# Patient Record
Sex: FEMALE | Race: WHITE | NOT HISPANIC OR LATINO | Employment: PART TIME | URBAN - METROPOLITAN AREA
[De-identification: names, ages, dates, MRNs, and addresses within clinical notes are randomized per-mention and may not be internally consistent; named-entity substitution may affect disease eponyms.]

---

## 2017-09-01 ENCOUNTER — HOSPITAL ENCOUNTER (EMERGENCY)
Facility: CLINIC | Age: 52
Discharge: HOME | End: 2017-09-01
Attending: PHYSICIAN ASSISTANT | Admitting: PHYSICIAN ASSISTANT
Payer: COMMERCIAL

## 2017-09-01 ENCOUNTER — APPOINTMENT (OUTPATIENT)
Dept: CT IMAGING | Facility: CLINIC | Age: 52
End: 2017-09-01
Attending: PHYSICIAN ASSISTANT
Payer: COMMERCIAL

## 2017-09-01 VITALS
HEART RATE: 64 BPM | OXYGEN SATURATION: 98 % | RESPIRATION RATE: 16 BRPM | WEIGHT: 145 LBS | TEMPERATURE: 98.3 F | SYSTOLIC BLOOD PRESSURE: 122 MMHG | BODY MASS INDEX: 25.69 KG/M2 | DIASTOLIC BLOOD PRESSURE: 72 MMHG | HEIGHT: 63 IN

## 2017-09-01 DIAGNOSIS — S09.90XA CLOSED HEAD INJURY, INITIAL ENCOUNTER: ICD-10-CM

## 2017-09-01 PROCEDURE — 70450 CT HEAD/BRAIN W/O DYE: CPT

## 2017-09-01 PROCEDURE — 99284 EMERGENCY DEPT VISIT MOD MDM: CPT | Mod: 25

## 2017-09-01 ASSESSMENT — ENCOUNTER SYMPTOMS
FATIGUE: 1
DIZZINESS: 1
HEADACHES: 1

## 2017-09-01 NOTE — ED AVS SNAPSHOT
Emergency Department    [address] [place] [address]    Phone:  [phone]    Fax:  [phone]                                       Ruth Amador   MRN: [id number]    Department:   Emergency Department   Date of Visit:  9/1/2017           After Visit Summary Signature Page     I have received my discharge instructions, and my questions have been answered. I have discussed any challenges I see with this plan with the nurse or doctor.    ..........................................................................................................................................  Patient/Patient Representative Signature      ..........................................................................................................................................  Patient Representative Print Name and Relationship to Patient    ..................................................               ................................................  Date                                            Time    ..........................................................................................................................................  Reviewed by Signature/Title    ...................................................              ..............................................  Date                                                            Time

## 2017-09-01 NOTE — ED AVS SNAPSHOT
Emergency Department    [address] [facility] [address]    Phone:  [phone]    Fax:  [phone]                                       Ruth Amador   MRN: [id number]    Department:   Emergency Department   Date of Visit:  9/1/2017           Patient Information     Date Of Birth          1965        Your diagnoses for this visit were:     Closed head injury, initial encounter        You were seen by Christen Sky PA-C.      Follow-up Information     Follow up with [facility] In 4 days.    Specialties:  Podiatry, Internal Medicine, Family Medicine    Contact information:    [address] [address] [phone]        Follow up with  Emergency Department.    Specialty:  EMERGENCY MEDICINE    Why:  If symptoms worsen    Contact information:    [address] [facility] [id number] [phone]        Discharge Instructions       Discharge Instructions  Head Injury    You have been seen today for a head injury. You were checked for serious problems, like bleeding on the brain, but these problems cannot always be found right away.  Due to this risk, you should not be alone for 24 hours after your injury.  Follow up with your regular physician in 3-4 days. If you are taking a blood thinner, such as aspirin, Pradaxa  (dabigatran), Coumadin  (warfarin), or Plavix  (clopidogrel), you are at especially high risk for immediate or delayed bleeding, and need to re-check with a physician in 24 hours, or sooner if any of the symptoms below happen.     Return to the Emergency Department if:    You are confused, have amnesia, or you are not acting right.    Your headache gets worse or you start to have a really bad headache even with your recommended treatment plan.    You vomit more than once.    You have a convulsion or seizure.    You have trouble walking.    You have weakness or paralysis in an arm or a leg.    You have  blood or fluid coming from your ears or nose.    You have new symptoms or anything that worries you.    Sleeping:  It is okay for you to sleep, but someone should wake you up as instructed by your doctor, and someone should check on you at your usual time to wake up.     Activity:    Do not drive for at least 24 hours.    Do not drive if you have dizzy spells or trouble concentrating, or remembering things.    Do not return to any contact sports until cleared by your regular doctor.     Follow-up:  It is very important that you make an appointment with your clinic and go to the appointment.  If you do not follow-up with your regular doctor, it may result in missing an important development which could result in permanent injury or disability and/or lasting pain.  If there is any problem keeping your appointment, call your doctor or return to the Emergency Department.    MORE INFORMATION:    Concussion:  A concussion is a minor head injury that may cause temporary problems with the way your brain works.  Some symptoms include:  confusion, amnesia, nausea and vomiting, dizziness, fatigue, memory or concentration problems, irritability and sleep problems.    CT Scans: Your evaluation today may have included a CT scan (CAT scan) to look for things like bleeding or a skull fracture (break).  CT scans involve radiation and too many CT scans can cause serious health problems like cancer, especially in children.  Because of this, your doctor may not have ordered a CT scan today if they think you are at low risk for a serious or life threatening problem.    If you were given a prescription for medicine here today, be sure to read all of the information (including the package insert) that comes with your prescription.  This will include important information about the medicine, its side effects, and any warnings that you need to know about.  The pharmacist who fills the prescription can provide more information and answer  questions you may have about the medicine.  If you have questions or concerns that the pharmacist cannot address, please call or return to the Emergency Department.       Remember that you can always come back to the Emergency Department if you are not able to see your regular doctor in the amount of time listed above, if you get any new symptoms, or if there is anything that worries you.            24 Hour Appointment Hotline       To make an appointment at any [facility], call [phone] ([phone]). If you don't have a family doctor or clinic, we will help you find one. [facility] are conveniently located to serve the needs of you and your family.             Review of your medicines      Notice     You have not been prescribed any medications.            Procedures and tests performed during your visit     CT Head w/o Contrast      Orders Needing Specimen Collection     None      Pending Results     Date and Time Order Name Status Description    9/1/2017 1925 CT Head w/o Contrast Preliminary             Pending Culture Results     No orders found from 8/30/2017 to 9/2/2017.            Pending Results Instructions     If you had any lab results that were not finalized at the time of your Discharge, you can call the ED Lab Result RN at [phone]. You will be contacted by this team for any positive Lab results or changes in treatment. The nurses are available 7 days a week from 10A to 6:30P.  You can leave a message 24 hours per day and they will return your call.        Test Results From Your Hospital Stay        9/1/2017  7:45 PM      Narrative     CT HEAD W/O CONTRAST   9/1/2017 7:42 PM     HISTORY: head injury yesterday, hx of two skull fracture a few months  ago    TECHNIQUE:  Axial images of the head without IV contrast material.  Radiation dose for this scan was reduced using automated exposure  control, adjustment of the mA and/or kV according to patient size, or  iterative  reconstruction technique.    COMPARISON: None.    FINDINGS: The ventricles are normal in size, shape and configuration.  The brain parenchyma and subarachnoid spaces are normal. There is no  evidence of intracranial hemorrhage, mass, acute infarct or anomaly.  The visualized portions of the sinuses and mastoids appear normal. No  bleed or fracture identified. No brain contusions are seen..        Impression     IMPRESSION:  No bleed or skull fracture identified.                  Clinical Quality Measure: Blood Pressure Screening     Your blood pressure was checked while you were in the emergency department today. The last reading we obtained was  BP: 124/67 . Please read the guidelines below about what these numbers mean and what you should do about them.  If your systolic blood pressure (the top number) is less than 120 and your diastolic blood pressure (the bottom number) is less than 80, then your blood pressure is normal. There is nothing more that you need to do about it.  If your systolic blood pressure (the top number) is 120-139 or your diastolic blood pressure (the bottom number) is 80-89, your blood pressure may be higher than it should be. You should have your blood pressure rechecked within a year by a primary care provider.  If your systolic blood pressure (the top number) is 140 or greater or your diastolic blood pressure (the bottom number) is 90 or greater, you may have high blood pressure. High blood pressure is treatable, but if left untreated over time it can put you at risk for heart attack, stroke, or kidney failure. You should have your blood pressure rechecked by a primary care provider within the next 4 weeks.  If your provider in the emergency department today gave you specific instructions to follow-up with your doctor or provider even sooner than that, you should follow that instruction and not wait for up to 4 weeks for your follow-up visit.        Thank you for choosing [facility]      "  Thank you for choosing [facility] for your care. Our goal is always to provide you with excellent care. Hearing back from our patients is one way we can continue to improve our services. Please take a few minutes to complete the written survey that you may receive in the mail after you visit with us. Thank you!        "[employer]hart Information     HDF lets you send messages to your doctor, view your test results, renew your prescriptions, schedule appointments and more. To sign up, go to [email or web address] . Click on \"Log in\" on the left side of the screen, which will take you to the Welcome page. Then click on \"Sign up Now\" on the right side of the page.     You will be asked to enter the access code listed below, as well as some personal information. Please follow the directions to create your username and password.     Your access code is: [email or web address]  Expires: 2017  8:04 PM     Your access code will  in 90 days. If you need help or a new code, please call your [facility] clinic or [phone].        Care EveryWhere ID     This is your Care EveryWhere ID. This could be used by other organizations to access your [facility] medical records  [id number]        Equal Access to Services     POP ONEILL : Katerine Hassan, waaxda adelinaadaha, qaybta kaalmada adegoranyada, jodi thompson. So [facility] [phone].    ATENCIÓN: Si habla español, tiene a pimentel disposición servicios gratuitos de asistencia lingüística. Llame al [phone].    We comply with applicable federal civil rights laws and [place] laws. We do not discriminate on the basis of race, color, national origin, age, disability sex, sexual orientation or gender identity.            After Visit Summary       This is your record. Keep this with you and show to your community pharmacist(s) and doctor(s) at your next visit.                  "

## 2017-09-01 NOTE — ED PROVIDER NOTES
"  History     Chief Complaint:  Fall    HPI   Ruth Amador is a 52 year old female who presents after a fall. Last night she fell on a metal desk and it her head posteriorly. She was evaluated by an EMT after this with a normal exam. She is now experiencing a bump on her head and headache. Patient did not lose consciousness. She has a history of skull fracture in April which left her with some dizziness. At that time, CT scan was negative for a brain bleed. At this time, she is having a hard time distinguishing symptoms of mild headache, dizziness and fatigue as her normal old concussion symptoms (which she has been having) or new symptoms. Patient denies fever, confusion, vision changes, vomiting, other areas of pain, or any other symptoms or concerns.     Allergies:  Tramadol     Medications:    The patient is not currently taking any prescribed medications.    Past Medical History:    History reviewed.  No significant past medical history.     Past Surgical History:    History reviewed. No pertinent past surgical history.    Family History:    History reviewed. No pertinent family history.    Social History:  Presents to the ED alone.   Patient just moved to [place] 1 month ago.    Review of Systems   Constitutional: Positive for fatigue.   Eyes: Negative for visual disturbance.   Gastrointestinal: Negative for nausea and vomiting.   Skin: Negative for wound.   Neurological: Positive for dizziness and headaches. Negative for syncope.   Psychiatric/Behavioral: Negative for confusion.   All other systems reviewed and are negative.      Physical Exam     Patient Vitals for the past 24 hrs:   BP Temp Temp src Pulse Resp SpO2 Height Weight   09/01/17 2007 122/72 - - 64 16 98 % - -   09/01/17 1658 124/67 98.3  F (36.8  C) Oral 61 16 99 % 1.6 m (5' 3\") 65.8 kg (145 lb)      Physical Exam  General: Resting comfortably on the gurney.    Head:  The scalp, head and face appear normal. No evidence of trauma.     Very " small amount of swelling to the posterior, superior scalp; no tenderness or step-offs.     No racoon eyes or battel signs.   ENT:  Pupils are equal, round and reactive to light. EOM intact. No nystagmus.    Oropharynx is moist.  No uvular deviation. Posterior oropharynx is without erythema, exudate or tonsillar swelling.     No hemotympanum bilaterally.   Neck:  Supple, no rigidity noted. Normal ROM.     Trachea midline. No mass detected.      No cervical midline or paraspinal muscle tenderness. No step-offs.   Resp:  Non-labored breathing. No tachypnea.     Lung fields clear to auscultation without wheezes or rales.   CV:  Regular rate and rhythm. Normal S1 and S2, no S3 or S4.     No pathological murmur detected.   MS:  Normal muscular tone.     5/5 and symmetric strength with dorsi- and plantar-flexion,   Neuro:  GCS 15.     Awake and alert. Obeys commands appropriately.     Speech is clear.     Sensation intact to light touch upper and lower extremities.   Skin:  No rash, ecchymosis, abrasions or lacerations.   Psych: Normal affect. Appropriate interactions.      Emergency Department Course     Imaging:  Head CT, without contrast, per radiology:   IMPRESSION:  No bleed or skull fracture identified.    Radiographic findings were communicated with the patient who voiced understanding of the findings.    Emergency Department Course:  Nursing notes and vitals reviewed.  I performed an exam of the patient as documented above.  The above workup was undertaken.  1948: I rechecked the patient and discussed results.  Findings and plan explained to the Patient. Patient discharged home, status improved, with instructions regarding supportive care, medications, and reasons to return as well as the importance of close follow-up was reviewed.     Impression & Plan      Medical Decision Making:  Ruth Amador is a 52 year old female who presents for evaluation after trauma to the head.  This patient has a history and  clinical exam consistent with closed head injury.  The differential diagnosis includes skull fracture, epidural hematoma, subdural hematoma, intracerebral hemorrhage, and traumatic subarachnoid hemorrhage; all of these are highly unlikely in this clinical setting. Given her skull fracture 4 months ago, CT was undertaken. CT reveals no skull fracture or intracerebral hemorrhage. The patient is at their normal baseline neurologic state.   Return to ED for red flags (change in behavior, drowsiness, seizures, vomiting, etc) and gave concussion precautions for home.   No other areas of pain or injury to require further emergent evaluation. She will follow-up with primary care in 2-3 days for head injury recheck. I discussed the results, plan and any additional questions with the patient. She verbalized understanding and agreement with the plan.       Diagnosis:    ICD-10-CM    1. Closed head injury, initial encounter S09.90XA        Disposition:  Discharged to home.       Becki SMITH, am serving as a scribe on 9/1/2017 at 7:14 PM to personally document services performed by Christen Sky PA-C, based on my observations and the provider's statements to me.    EMERGENCY DEPARTMENT       Christen Sky PA-C  09/02/17 0046

## 2017-09-02 ASSESSMENT — ENCOUNTER SYMPTOMS
CONFUSION: 0
NAUSEA: 0
VOMITING: 0
WOUND: 0

## 2017-09-02 NOTE — DISCHARGE INSTRUCTIONS
Discharge Instructions  Head Injury    You have been seen today for a head injury. You were checked for serious problems, like bleeding on the brain, but these problems cannot always be found right away.  Due to this risk, you should not be alone for 24 hours after your injury.  Follow up with your regular physician in 3-4 days. If you are taking a blood thinner, such as aspirin, Pradaxa  (dabigatran), Coumadin  (warfarin), or Plavix  (clopidogrel), you are at especially high risk for immediate or delayed bleeding, and need to re-check with a physician in 24 hours, or sooner if any of the symptoms below happen.     Return to the Emergency Department if:    You are confused, have amnesia, or you are not acting right.    Your headache gets worse or you start to have a really bad headache even with your recommended treatment plan.    You vomit more than once.    You have a convulsion or seizure.    You have trouble walking.    You have weakness or paralysis in an arm or a leg.    You have blood or fluid coming from your ears or nose.    You have new symptoms or anything that worries you.    Sleeping:  It is okay for you to sleep, but someone should wake you up as instructed by your doctor, and someone should check on you at your usual time to wake up.     Activity:    Do not drive for at least 24 hours.    Do not drive if you have dizzy spells or trouble concentrating, or remembering things.    Do not return to any contact sports until cleared by your regular doctor.     Follow-up:  It is very important that you make an appointment with your clinic and go to the appointment.  If you do not follow-up with your regular doctor, it may result in missing an important development which could result in permanent injury or disability and/or lasting pain.  If there is any problem keeping your appointment, call your doctor or return to the Emergency Department.    MORE INFORMATION:    Concussion:  A concussion is a minor head  injury that may cause temporary problems with the way your brain works.  Some symptoms include:  confusion, amnesia, nausea and vomiting, dizziness, fatigue, memory or concentration problems, irritability and sleep problems.    CT Scans: Your evaluation today may have included a CT scan (CAT scan) to look for things like bleeding or a skull fracture (break).  CT scans involve radiation and too many CT scans can cause serious health problems like cancer, especially in children.  Because of this, your doctor may not have ordered a CT scan today if they think you are at low risk for a serious or life threatening problem.    If you were given a prescription for medicine here today, be sure to read all of the information (including the package insert) that comes with your prescription.  This will include important information about the medicine, its side effects, and any warnings that you need to know about.  The pharmacist who fills the prescription can provide more information and answer questions you may have about the medicine.  If you have questions or concerns that the pharmacist cannot address, please call or return to the Emergency Department.       Remember that you can always come back to the Emergency Department if you are not able to see your regular doctor in the amount of time listed above, if you get any new symptoms, or if there is anything that worries you.

## 2018-03-06 ENCOUNTER — OFFICE VISIT (OUTPATIENT)
Dept: SURGERY | Facility: CLINIC | Age: 53
End: 2018-03-06
Payer: COMMERCIAL

## 2018-03-06 VITALS
HEIGHT: 62 IN | HEART RATE: 71 BPM | BODY MASS INDEX: 26.68 KG/M2 | WEIGHT: 145 LBS | OXYGEN SATURATION: 97 % | DIASTOLIC BLOOD PRESSURE: 79 MMHG | SYSTOLIC BLOOD PRESSURE: 127 MMHG

## 2018-03-06 DIAGNOSIS — M54.2 CERVICALGIA: ICD-10-CM

## 2018-03-06 DIAGNOSIS — F41.9 ANXIETY AND DEPRESSION: ICD-10-CM

## 2018-03-06 DIAGNOSIS — F07.81 POST CONCUSSION SYNDROME: Primary | ICD-10-CM

## 2018-03-06 DIAGNOSIS — F32.A ANXIETY AND DEPRESSION: ICD-10-CM

## 2018-03-06 ASSESSMENT — PATIENT HEALTH QUESTIONNAIRE - PHQ9
10. IF YOU CHECKED OFF ANY PROBLEMS, HOW DIFFICULT HAVE THESE PROBLEMS MADE IT FOR YOU TO DO YOUR WORK, TAKE CARE OF THINGS AT HOME, OR GET ALONG WITH OTHER PEOPLE: SOMEWHAT DIFFICULT
SUM OF ALL RESPONSES TO PHQ QUESTIONS 1-9: 17
SUM OF ALL RESPONSES TO PHQ QUESTIONS 1-9: 17

## 2018-03-06 ASSESSMENT — ANXIETY QUESTIONNAIRES
1. FEELING NERVOUS, ANXIOUS, OR ON EDGE: NEARLY EVERY DAY
GAD7 TOTAL SCORE: 17
4. TROUBLE RELAXING: NEARLY EVERY DAY
GAD7 TOTAL SCORE: 17
GAD7 TOTAL SCORE: 17
3. WORRYING TOO MUCH ABOUT DIFFERENT THINGS: NEARLY EVERY DAY
2. NOT BEING ABLE TO STOP OR CONTROL WORRYING: NEARLY EVERY DAY
7. FEELING AFRAID AS IF SOMETHING AWFUL MIGHT HAPPEN: NEARLY EVERY DAY
6. BECOMING EASILY ANNOYED OR IRRITABLE: SEVERAL DAYS
5. BEING SO RESTLESS THAT IT IS HARD TO SIT STILL: SEVERAL DAYS
7. FEELING AFRAID AS IF SOMETHING AWFUL MIGHT HAPPEN: NEARLY EVERY DAY

## 2018-03-06 ASSESSMENT — PAIN SCALES - GENERAL: PAINLEVEL: MILD PAIN (3)

## 2018-03-06 NOTE — PATIENT INSTRUCTIONS
"Greg will call you to set up a follow up appt in 6 wks  Someone from [facility] will contact you RE meeting with Shashank Rodarte  Acupuncture: please schedule directly with Tony Macario    ~ You WILL get better~    GENERAL ADVICE  ~ Avoid activities that trigger your symptoms.  Stop your activity as soon as you feel the symptoms coming on.  ~ Rest (eyes closed, dark room)  as frequently as needed to help your symptoms go away.  ~ Do not try to push through symptoms or they may get worse or take longer to go away.  ~ Allow yourself more time for activities.  ~ Write things down.  At home, at work, whenever there is something that you should remember, even it is simple.    SCREENS  ~ Change settings on your phone and computer using the \"Blue Light Filter\"   ~ The goal is making screens more yellow and less blue.     ~ If this is not an option you can download this program: [place], to adjust your screen resolution.  ~ Turn screen brightness down  ~ Increase font size  ~ Limit time on screen activities including computer, TV, e-readers and phones.     ~Take breaks from these activities often- try starting with no more than 20 minutes at a time.  ~ Avoid reading if it increases your symptoms.     ~ Audiobooks are a great option, often available at your public library.      ~ Downloading podcasts to listen to is also an option    HEADACHE  ~ Take tylenol (1000 mg) three times a day as needed  ~ Take ibuprofen (600 mg) three times a day as needed (take with food)    LIGHT SENSITIVITY   ~ Avoid florescent lighting when possible  ~ Always wear sunglasses outside and even wear them indoors if helpful.  ~ No night driving, or in bad weather  - Yellow or balta-tinted lenses may help reduce computer or nighttime driving glare.   - Amazon has a $10 option: [employer] Yellow Night Vision    NOISE SENSITIVITY  ~ Avoid crowded areas  ~ Avoid multiple conversations at the same time around you  ~ Avoid loud music or loud " sounds. Gyms, concerts, stadiums may be hard.  ~ Try high-fidelity ear plugs, designed for musicians. One example is Etymotic ETY-Plugs, can be found on Amazon.com for ~$13  ~ Try listening to calming sounds such as those found in the Calm lillian to help shift your focus off of more irritating sounds.    NECK PAIN  ~ Ice or Heat are good~  ~ Massage is ok if it doesn't trigger more symptoms~  ~ Gentle stretches and range-of-motion are good    DIZZINESS  ~ No driving if dizzy.   ~ Keep a chair at the side of the bed to help get steady prior to getting up and going to the bathroom  ~ No biking, climbing heights or using ladders or stepstools.    FATIGUE  ~ Daily exercise is strongly encouraged.  Start with a 10 min walk and increase the time as tolerated until you are walking 30 minutes per day.      ANXIETY OR MOOD SWINGS:  ~ If you are irritable or anxious, take a break in a quiet room.  ~ Try using the free Calm lillian (see [employer] Lillian store or Google Play Store) for guided breathing and mindfulness/meditation.  ~ Explore [employer] (https://www.headspace.com) for free and easy-to-use meditation guidance.       Diet:  - In principle incorporate more protein, lots of veggies, some fruit, whole grains.   - Little to no sweets, dairy, and processed carbs.   - Mediterranean Diet is an easy-to-follow example.  ~ Drink plenty of water throughout the day (8-10 glasses per day)  ~ Avoid alcohol and caffeine    Helpful Supplements (usually with the vitamins at any pharmacy):  - Tumeric 500mg twice daily  - B-Complex vitamin once daily  - Vitamin D, 2000 IU once daily  - Omega 3 twice daily   - particularly with higher DHA.   - one brand is Omega Jym- 1 capsule twice daily
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
## 2018-03-06 NOTE — MR AVS SNAPSHOT
"              After Visit Summary   3/6/2018    Ruth Amador    MRN: [id number]           Patient Information     Date Of Birth          1965        Visit Information        Provider Department      3/6/2018 5:00 PM Oneal Thorne PA-C [facility]        Today's Diagnoses     Post concussion syndrome    -  1    Cervicalgia        Anxiety and depression          Care Instructions    Greg will call you to set up a follow up appt in 6 wks  Someone from [facility] behavioral health will contact you RE meeting with Shashank Rodarte  Acupuncture: please schedule directly with Tony Macario    ~ You WILL get better~    GENERAL ADVICE  ~ Avoid activities that trigger your symptoms.  Stop your activity as soon as you feel the symptoms coming on.  ~ Rest (eyes closed, dark room)  as frequently as needed to help your symptoms go away.  ~ Do not try to push through symptoms or they may get worse or take longer to go away.  ~ Allow yourself more time for activities.  ~ Write things down.  At home, at work, whenever there is something that you should remember, even it is simple.    SCREENS  ~ Change settings on your phone and computer using the \"Blue Light Filter\"   ~ The goal is making screens more yellow and less blue.     ~ If this is not an option you can download this program: [employer], to adjust your screen resolution.  ~ Turn screen brightness down  ~ Increase font size  ~ Limit time on screen activities including computer, TV, e-readers and phones.     ~Take breaks from these activities often- try starting with no more than 20 minutes at a time.  ~ Avoid reading if it increases your symptoms.     ~ Audiobooks are a great option, often available at your public library.      ~ Downloading podcasts to listen to is also an option    HEADACHE  ~ Take tylenol (1000 mg) three times a day as needed  ~ Take ibuprofen (600 mg) three times a day as needed (take with food)    LIGHT SENSITIVITY   ~ Avoid florescent lighting " when possible  ~ Always wear sunglasses outside and even wear them indoors if helpful.  ~ No night driving, or in bad weather  - Yellow or balta-tinted lenses may help reduce computer or nighttime driving glare.   - Amazon has a $10 option: Besgoods Yellow Night Vision    NOISE SENSITIVITY  ~ Avoid crowded areas  ~ Avoid multiple conversations at the same time around you  ~ Avoid loud music or loud sounds. Gyms, concerts, stadiums may be hard.  ~ Try high-fidelity ear plugs, designed for musicians. One example is Etymotic ETY-Plugs, can be found on Amazon.com for ~$13  ~ Try listening to calming sounds such as those found in the Calm lillian to help shift your focus off of more irritating sounds.    NECK PAIN  ~ Ice or Heat are good~  ~ Massage is ok if it doesn't trigger more symptoms~  ~ Gentle stretches and range-of-motion are good    DIZZINESS  ~ No driving if dizzy.   ~ Keep a chair at the side of the bed to help get steady prior to getting up and going to the bathroom  ~ No biking, climbing heights or using ladders or stepstools.    FATIGUE  ~ Daily exercise is strongly encouraged.  Start with a 10 min walk and increase the time as tolerated until you are walking 30 minutes per day.      ANXIETY OR MOOD SWINGS:  ~ If you are irritable or anxious, take a break in a quiet room.  ~ Try using the free Calm lillian (see Ablynx Lillian store or Google Play Store) for guided breathing and mindfulness/meditation.  ~ Explore Inuvo (https://www.headspace.com) for free and easy-to-use meditation guidance.       Diet:  - In principle incorporate more protein, lots of veggies, some fruit, whole grains.   - Little to no sweets, dairy, and processed carbs.   - Mediterranean Diet is an easy-to-follow example.  ~ Drink plenty of water throughout the day (8-10 glasses per day)  ~ Avoid alcohol and caffeine    Helpful Supplements (usually with the vitamins at any pharmacy):  - Tumeric 500mg twice daily  - B-Complex vitamin once daily  -  Vitamin D, 2000 IU once daily  - Omega 3 twice daily   - particularly with higher DHA.   - one brand is Omega Jym- 1 capsule twice daily          Follow-ups after your visit        Additional Services     ACUPUNCTURE REFERRAL       Tony Saleh            BEHAVIORAL / SPIRITUAL HEALTH ([facility] ONLY)       The [facility] Behavioral / Spiritual Health Team is available to support any patient who receives care at the [facility].  This team provides and/or connects patients and families to appropriate psychological, spiritual, and social work services.     The Behavioral / Spiritual Health Team  also provides consultation to medical providers and other care team members regarding patient behavioral/mental health issues, psychosocial concerns, or spiritual needs.  Services are provided by behavioral health clinicians, licensed psychologists, licensed social workers, and a .     The Behavioral / Spiritual Health Team will coordinate with the patient's medical care team to determine the appropriate response(s) to the current need.        Please provide the following information to assist us in addressing the current concern:    1. Reason for Referral? Anxiety, depression- postconcussion. For counseling with Shashank Rodarte.    2. Has clinical staff discussed this referral to the Behavioral / Spiritual Health Team with the patient and/or caregiver? Yes    3. Who should we contact on the patient's care team to discuss this issue further or to coordinate care? Oneal Thorne PA-C            CONCUSSION  REFERRAL       You have been referred to [facility]  [facility].    The  Representative will assist you in the coordination of your concussion care as prescribed by your provider.    The  Representative will contact you within one business day, or you may contact the  Representative at [phone].    Referral Options:  Non-Sports related  concussion management- 6 wks  [facility]  Physical Therapy, Evaluate and Treat    External:  Acupuncture: Tony Macario, [facility] Behav health    Coverage of these services are subject to the terms and limitations of your health insurance plan.  Please call member services at your health plan with any benefit or coverage questions.     If X-rays, CT or MRI's have been performed, please contact the facility where they were done, to arrange for  prior to your scheduled appointment.  Please bring this referral request to your appointment and present it to your specialist.                  Follow-up notes from your care team     Return in about 6 weeks (around 2018).      Who to contact     Please call your clinic at [phone] to:    Ask questions about your health    Make or cancel appointments    Discuss your medicines    Learn about your test results    Speak to your doctor            Additional Information About Your Visit        MyChart Information     [employer] is an electronic gateway that provides easy, online access to your medical records. With GoComm, you can request a clinic appointment, read your test results, renew a prescription or communicate with your care team.     To sign up for [employer] visit the website at [email or web address]   You will be asked to enter the access code listed below, as well as some personal information. Please follow the directions to create your username and password.     Your access code is: [email or web address]  Expires: 2018  5:29 PM     Your access code will  in 90 days. If you need help or a new code, please contact your [facility] or call [phone] for assistance.        Care EveryWhere ID     This is your Care EveryWhere ID. This could be used by other organizations to access your [facility] medical records  [id number]        Your Vitals Were     Pulse Height Last Period Pulse Oximetry  "BMI (Body Mass Index)       71 5' 2\" 10/15/2017 97% 26.52 kg/m2        Blood Pressure from Last 3 Encounters:   03/06/18 127/79   09/01/17 122/72    Weight from Last 3 Encounters:   03/06/18 145 lb   09/01/17 145 lb              We Performed the Following     ACUPUNCTURE REFERRAL     BEHAVIORAL / SPIRITUAL HEALTH ([facility] ONLY)     CONCUSSION  REFERRAL        Primary Care Provider Office Phone # Fax #    [facility] [phone] [address] [address]        Equal Access to Services     [facility]: Hadii noa cole hadmarisol Sojuan f, waaxda luqadaha, qaybta kaalmada leandro, jodi aaron . So [facility] [phone].    ATENCIÓN: Si habla español, tiene a pimentel disposición servicios gratuitos de asistencia lingüística. [facility] [phone].    We comply with applicable federal civil rights laws and [place] laws. We do not discriminate on the basis of race, color, national origin, age, disability, sex, sexual orientation, or gender identity.            Thank you!     Thank you for choosing [facility]  for your care. Our goal is always to provide you with excellent care. Hearing back from our patients is one way we can continue to improve our services. Please take a few minutes to complete the written survey that you may receive in the mail after your visit with us. Thank you!             Your Updated Medication List - Protect others around you: Learn how to safely use, store and throw away your medicines at www.disposemymeds.org.          This list is accurate as of 3/6/18  5:29 PM.  Always use your most recent med list.                   Brand Name Dispense Instructions for use Diagnosis    ASPIRIN PO      Take 650 mg by mouth as needed for moderate pain          "

## 2018-03-06 NOTE — NURSING NOTE
"Chief Complaint   Patient presents with     Consult     Multiple concussions in the last year.  fall after syncopal episode w/ LOC       Vitals:    03/06/18 1612   BP: 127/79   Pulse: 71   SpO2: 97%   Weight: 65.8 kg (145 lb)   Height: 1.575 m (5' 2\")       Body mass index is 26.52 kg/(m^2).                            "

## 2018-03-07 ASSESSMENT — ANXIETY QUESTIONNAIRES: GAD7 TOTAL SCORE: 17

## 2018-03-07 ASSESSMENT — PATIENT HEALTH QUESTIONNAIRE - PHQ9: SUM OF ALL RESPONSES TO PHQ QUESTIONS 1-9: 17

## 2018-03-13 ENCOUNTER — OFFICE VISIT (OUTPATIENT)
Dept: INTERNAL MEDICINE | Facility: CLINIC | Age: 53
End: 2018-03-13
Attending: PHYSICIAN ASSISTANT
Payer: COMMERCIAL

## 2018-03-13 DIAGNOSIS — F43.23 ADJUSTMENT DISORDER WITH MIXED ANXIETY AND DEPRESSED MOOD: Primary | ICD-10-CM

## 2018-03-13 NOTE — MR AVS SNAPSHOT
After Visit Summary   3/13/2018    Ruth Amador    MRN: [id number]           Patient Information     Date Of Birth          1965        Visit Information        Provider Department      3/13/2018 9:00 AM Wero Rodarte LMFT [facility]        Today's Diagnoses     Adjustment disorder with mixed anxiety and depressed mood    -  1       Follow-ups after your visit        Your next 10 appointments already scheduled     2018  9:00 AM CDT   (Arrive by 8:45 AM)   Return Visit with VANNESA Tang   [facility] ([facility] [place])    [address] [place] [id number] [phone]            2018 11:00 AM CDT   (Arrive by 10:45 AM)   RETURN CONCUSSION with Oneal Thorne PA-C   [facility] ([facility])    [address] [place] [id number] [phone]              Who to contact     Please call your clinic at [phone] to:    Ask questions about your health    Make or cancel appointments    Discuss your medicines    Learn about your test results    Speak to your doctor            Additional Information About Your Visit        MyChart Information     Genabilityhart is an electronic gateway that provides easy, online access to your medical records. With Lucibel, you can request a clinic appointment, read your test results, renew a prescription or communicate with your care team.     To sign up for Zadyt visit the website at [email or web address]   You will be asked to enter the access code listed below, as well as some personal information. Please follow the directions to create your username and password.     Your access code is: [email or web address]  Expires: 2018  6:29 PM     Your access code will  in 90 days. If you need help or a new code, please contact your [facility] or call [phone]  for assistance.        Care EveryWhere ID     This is your Care EveryWhere ID. This could be used by other organizations to access your [place] medical records  [id number]         Blood Pressure from Last 3 Encounters:   03/06/18 127/79   09/01/17 122/72    Weight from Last 3 Encounters:   03/06/18 65.8 kg (145 lb)   09/01/17 65.8 kg (145 lb)              Today, you had the following     No orders found for display       Primary Care Provider Office Phone # Fax #    [facility] [phone] [address] [facility] [address] [address]        Equal Access to Services     [facility] : Hadii noa cole hadmachelleo Sojuan f, waaxda luqadaha, qaybta kaalmada adegoranyada, jodi aaron . So [facility] [phone].    ATENCIÓN: Si habla español, tiene a pimentel disposición servicios gratuitos de asistencia lingüística. Llame al [phone].    We comply with applicable federal civil rights laws and [place] laws. We do not discriminate on the basis of race, color, national origin, age, disability, sex, sexual orientation, or gender identity.            Thank you!     Thank you for choosing [facility]  for your care. Our goal is always to provide you with excellent care. Hearing back from our patients is one way we can continue to improve our services. Please take a few minutes to complete the written survey that you may receive in the mail after your visit with us. Thank you!             Your Updated Medication List - Protect others around you: Learn how to safely use, store and throw away your medicines at www.disposemymeds.org.          This list is accurate as of 3/13/18 11:59 PM.  Always use your most recent med list.                   Brand Name Dispense Instructions for use Diagnosis    ASPIRIN PO      Take 650 mg by mouth as needed for moderate pain
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
## 2019-01-28 ENCOUNTER — OFFICE VISIT (OUTPATIENT)
Dept: OBGYN | Facility: CLINIC | Age: 54
End: 2019-01-28
Payer: COMMERCIAL

## 2019-01-28 VITALS
WEIGHT: 142 LBS | SYSTOLIC BLOOD PRESSURE: 118 MMHG | DIASTOLIC BLOOD PRESSURE: 66 MMHG | BODY MASS INDEX: 25.16 KG/M2 | HEIGHT: 63 IN

## 2019-01-28 DIAGNOSIS — Z01.419 ENCOUNTER FOR GYNECOLOGICAL EXAMINATION WITHOUT ABNORMAL FINDING: Primary | ICD-10-CM

## 2019-01-28 DIAGNOSIS — E07.9 THYROID DISORDER: ICD-10-CM

## 2019-01-28 DIAGNOSIS — Z13.220 SCREENING FOR LIPID DISORDERS: ICD-10-CM

## 2019-01-28 DIAGNOSIS — K64.4 EXTERNAL HEMORRHOIDS: ICD-10-CM

## 2019-01-28 DIAGNOSIS — Z13.29 SCREENING FOR THYROID DISORDER: ICD-10-CM

## 2019-01-28 DIAGNOSIS — Z12.4 SCREENING FOR CERVICAL CANCER: ICD-10-CM

## 2019-01-28 DIAGNOSIS — E55.9 VITAMIN D DEFICIENCY: ICD-10-CM

## 2019-01-28 DIAGNOSIS — J45.909 ASTHMA, UNSPECIFIED ASTHMA SEVERITY, UNSPECIFIED WHETHER COMPLICATED, UNSPECIFIED WHETHER PERSISTENT: ICD-10-CM

## 2019-01-28 DIAGNOSIS — F31.9 BIPOLAR 1 DISORDER (H): ICD-10-CM

## 2019-01-28 DIAGNOSIS — Z86.2 HISTORY OF ANEMIA: ICD-10-CM

## 2019-01-28 DIAGNOSIS — S06.9X0S TRAUMATIC BRAIN INJURY, WITHOUT LOSS OF CONSCIOUSNESS, SEQUELA (H): ICD-10-CM

## 2019-01-28 LAB
ERYTHROCYTE [DISTWIDTH] IN BLOOD BY AUTOMATED COUNT: 12.3 % (ref 10–15)
HCT VFR BLD AUTO: 44.4 % (ref 35–47)
HGB BLD-MCNC: 15.5 G/DL (ref 11.7–15.7)
MCH RBC QN AUTO: 31.3 PG (ref 26.5–33)
MCHC RBC AUTO-ENTMCNC: 34.9 G/DL (ref 31.5–36.5)
MCV RBC AUTO: 90 FL (ref 78–100)
PLATELET # BLD AUTO: 218 10E9/L (ref 150–450)
RBC # BLD AUTO: 4.96 10E12/L (ref 3.8–5.2)
WBC # BLD AUTO: 7 10E9/L (ref 4–11)

## 2019-01-28 PROCEDURE — 83550 IRON BINDING TEST: CPT | Performed by: OBSTETRICS & GYNECOLOGY

## 2019-01-28 PROCEDURE — 36415 COLL VENOUS BLD VENIPUNCTURE: CPT | Performed by: OBSTETRICS & GYNECOLOGY

## 2019-01-28 PROCEDURE — 82607 VITAMIN B-12: CPT | Performed by: OBSTETRICS & GYNECOLOGY

## 2019-01-28 PROCEDURE — 80061 LIPID PANEL: CPT | Performed by: OBSTETRICS & GYNECOLOGY

## 2019-01-28 PROCEDURE — 84443 ASSAY THYROID STIM HORMONE: CPT | Performed by: OBSTETRICS & GYNECOLOGY

## 2019-01-28 PROCEDURE — 87624 HPV HI-RISK TYP POOLED RSLT: CPT | Performed by: OBSTETRICS & GYNECOLOGY

## 2019-01-28 PROCEDURE — 99386 PREV VISIT NEW AGE 40-64: CPT | Performed by: OBSTETRICS & GYNECOLOGY

## 2019-01-28 PROCEDURE — 82728 ASSAY OF FERRITIN: CPT | Performed by: OBSTETRICS & GYNECOLOGY

## 2019-01-28 PROCEDURE — 83540 ASSAY OF IRON: CPT | Performed by: OBSTETRICS & GYNECOLOGY

## 2019-01-28 PROCEDURE — 85027 COMPLETE CBC AUTOMATED: CPT | Performed by: OBSTETRICS & GYNECOLOGY

## 2019-01-28 PROCEDURE — G0145 SCR C/V CYTO,THINLAYER,RESCR: HCPCS | Performed by: OBSTETRICS & GYNECOLOGY

## 2019-01-28 PROCEDURE — 82306 VITAMIN D 25 HYDROXY: CPT | Performed by: OBSTETRICS & GYNECOLOGY

## 2019-01-28 ASSESSMENT — ANXIETY QUESTIONNAIRES
6. BECOMING EASILY ANNOYED OR IRRITABLE: SEVERAL DAYS
2. NOT BEING ABLE TO STOP OR CONTROL WORRYING: NOT AT ALL
3. WORRYING TOO MUCH ABOUT DIFFERENT THINGS: SEVERAL DAYS
7. FEELING AFRAID AS IF SOMETHING AWFUL MIGHT HAPPEN: MORE THAN HALF THE DAYS
5. BEING SO RESTLESS THAT IT IS HARD TO SIT STILL: NOT AT ALL
IF YOU CHECKED OFF ANY PROBLEMS ON THIS QUESTIONNAIRE, HOW DIFFICULT HAVE THESE PROBLEMS MADE IT FOR YOU TO DO YOUR WORK, TAKE CARE OF THINGS AT HOME, OR GET ALONG WITH OTHER PEOPLE: SOMEWHAT DIFFICULT
GAD7 TOTAL SCORE: 9
1. FEELING NERVOUS, ANXIOUS, OR ON EDGE: MORE THAN HALF THE DAYS

## 2019-01-28 ASSESSMENT — PATIENT HEALTH QUESTIONNAIRE - PHQ9
SUM OF ALL RESPONSES TO PHQ QUESTIONS 1-9: 12
5. POOR APPETITE OR OVEREATING: NEARLY EVERY DAY

## 2019-01-28 ASSESSMENT — MIFFLIN-ST. JEOR: SCORE: 1208.24

## 2019-01-28 NOTE — LETTER
February 5, 2019    Ruth Amador  [address]  [place] [address]    Dear ,  This letter is regarding your recent Pap smear (cervical cancer screening) and Human Papillomavirus (HPV) test.  We are happy to inform you that your Pap smear result is normal. Cervical cancer is closely linked with certain types of HPV. Your results showed no evidence of high-risk HPV.  Therefore we recommend you return in 5 years for your next pap smear and HPV test.  You will still need to return to the clinic every year for an annual exam and other preventive tests.  If you have additional questions regarding this result, please call our registered nurse, Devika at [phone].  Sincerely,    Sylvia Deras MD/shanae
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
## 2019-01-29 LAB
CHOLEST SERPL-MCNC: 222 MG/DL
DEPRECATED CALCIDIOL+CALCIFEROL SERPL-MC: 34 UG/L (ref 20–75)
FERRITIN SERPL-MCNC: 52 NG/ML (ref 8–252)
HDLC SERPL-MCNC: 82 MG/DL
IRON SATN MFR SERPL: 38 % (ref 15–46)
IRON SERPL-MCNC: 125 UG/DL (ref 35–180)
LDLC SERPL CALC-MCNC: 121 MG/DL
NONHDLC SERPL-MCNC: 140 MG/DL
TIBC SERPL-MCNC: 326 UG/DL (ref 240–430)
TRIGL SERPL-MCNC: 94 MG/DL
TSH SERPL DL<=0.005 MIU/L-ACNC: 0.56 MU/L (ref 0.4–4)
VIT B12 SERPL-MCNC: 651 PG/ML (ref 193–986)

## 2019-01-29 ASSESSMENT — ANXIETY QUESTIONNAIRES: GAD7 TOTAL SCORE: 9

## 2019-01-31 LAB
COPATH REPORT: NORMAL
PAP: NORMAL

## 2019-02-01 LAB
FINAL DIAGNOSIS: NORMAL
HPV HR 12 DNA CVX QL NAA+PROBE: NEGATIVE
HPV16 DNA SPEC QL NAA+PROBE: NEGATIVE
HPV18 DNA SPEC QL NAA+PROBE: NEGATIVE
SPECIMEN DESCRIPTION: NORMAL
SPECIMEN SOURCE CVX/VAG CYTO: NORMAL

## 2019-10-26 ENCOUNTER — OFFICE VISIT (OUTPATIENT)
Dept: URGENT CARE | Facility: URGENT CARE | Age: 54
End: 2019-10-26
Payer: COMMERCIAL

## 2019-10-26 VITALS
TEMPERATURE: 97.2 F | BODY MASS INDEX: 24.8 KG/M2 | WEIGHT: 140 LBS | HEART RATE: 68 BPM | HEIGHT: 63 IN | SYSTOLIC BLOOD PRESSURE: 109 MMHG | DIASTOLIC BLOOD PRESSURE: 69 MMHG

## 2019-10-26 DIAGNOSIS — R59.1 LYMPHADENOPATHY: Primary | ICD-10-CM

## 2019-10-26 PROCEDURE — 99203 OFFICE O/P NEW LOW 30 MIN: CPT | Performed by: FAMILY MEDICINE

## 2019-10-26 RX ORDER — IBUPROFEN 200 MG
400 TABLET ORAL EVERY 4 HOURS PRN
COMMUNITY

## 2019-10-26 ASSESSMENT — MIFFLIN-ST. JEOR: SCORE: 1194.17

## 2019-10-26 NOTE — LETTER
October 26, 2019      Ruth Amador  [address] [facility] [address]        To Whom It May Concern:        Ruth Amador was seen in our clinic. Kindly excuse her work absence for next 2 days.            Sincerely,          James Goodwin MD

## 2019-10-27 NOTE — PATIENT INSTRUCTIONS
Patient Education     Lymphadenopathy  Lymphadenopathy is swelling of the lymph nodes. Lymph nodes are small, bean-shaped glands around the body.  What are lymph nodes?  Lymph nodes are part of your immune system. These glands are found in your neck, over your clavicle, armpits, groin, chest, and abdomen. They act as filters for lymph fluid as it flows through your body. Lymph fluid contains white blood cells (lymphocytes) that help the body fight infection and disease.   Why lymph nodes swell  Lymphadenopathy is very common. The glands often enlarge during a viral or bacterial infection. It can happen during a cold, the flu, or strep throat. The nodes may swell in just one area of the body, such as the neck (localized). Or nodes may swell all over the body (generalized). The neck (cervical) lymph nodes are the most common site of lymphadenopathy.  What causes lymphadenopathy?  Dead cells and fluid build up in the lymph nodes as they help fight infection or disease. This causes them to swell in size. Enlarged lymph nodes are often near the source of infection. This can help to find the cause of an infection. For example, swollen lymph nodes around the jaw may be because of an infection in the teeth or mouth. But lymphadenopathy may also be generalized. This is common in some viral illnesses such as infectious mononucleosis or chickenpox (varicella).  Lymphadenopathy can also be caused by:    Infection of a lymph node or small group of nodes (lymphadenitis)    Cancer    Reactions to medicines such as antibiotics and certain blood pressure, gout, and seizure medicines    Other health conditions, such as HIV infection, lupus, or sarcoidosis  Symptoms of lymphadenopathy  Lymphadenopathy can cause symptoms such as:    Lumps under the jaw, on the sides or back of the neck, in the armpits, in the groin, or in the chest or belly (abdomen)    Pain or tenderness in any of these areas    Redness or warmth in any of these  areas  You may also have symptoms from an infection causing the swollen glands. These symptoms may include fever, sore throat, body aches, or cough.  Diagnosing lymphadenopathy  Your healthcare provider will ask about your health history and symptoms. He or she will give you a physical exam and check the areas where lymph nodes are enlarged. Your healthcare provider will check the size and location of the nodes, and ask how long they have been swollen and if they are painful. Diagnostic tests and referral to specialists may be recommended. They may include:    Blood tests. These are done to check for signs of infection and other problems.    Urine test. This is also done to check for infection and other problems.    Chest X-ray, ultrasound, CT scan, or MRI scans. These tests can show enlarged lymph nodes or other problems.    Lymph node biopsy. If lymph nodes are swollen for 3 to 4 weeks, they may be checked with a biopsy. Small samples of lymph node tissue are taken and checked in a lab for signs of cancer. You may be referred to a specialist in blood disorders and cancer (hematologist and oncologist).  Treatment for lymphadenopathy  The treatment of enlarged lymph nodes depends on the cause. Enlarged lymph nodes are often harmless and go away without any treatment. Treatment is most often done on the cause of the enlarged nodes and may include:    Antibiotic medicine to treat a bacterial infection    Incision and drainage of a lymph node for lymphadenitis    Other medicines or procedures to treat the cause of the enlarged nodes  You may need follow-up exam in 3 to 4 weeks to recheck enlarged nodes.     When to call your healthcare provider  Call your healthcare provider if you have lymph nodes that are still swollen after 3 to 4 weeks, or as directed by your healthcare provider.   Date Last Reviewed: 5/1/2017 2000-2018 "[employer]. [address] [place] [address], [place] [address]. All rights reserved.  This information is not intended as a substitute for professional medical care. Always follow your healthcare professional's instructions.

## 2019-10-27 NOTE — PROGRESS NOTES
Subjective     Ruth Amador is a 54 year old female who presents to clinic today for the following health issues:    HPI   Chief Complaint   Patient presents with     Urgent Care     right side cheek lymph gland swollen for a few days.        Duration: few days     Description (location/character/radiation): swelling of right side of jaw, painful    Intensity:  moderate    Accompanying signs and symptoms: no fever, chills, dental pain, sore throat,     History (similar episodes/previous evaluation): None    Precipitating or alleviating factors: None    Therapies tried and outcome: OTC analgesia        Patient Active Problem List   Diagnosis     Traumatic brain injury (H)     Thyroid disorder     Bipolar 1 disorder (H)     Asthma     Past Surgical History:   Procedure Laterality Date     ------------OTHER-------------  04/2017    clavicle shattered     STRABISMUS SURGERY  2012     TONSILLECTOMY      as a child       Social History     Tobacco Use     Smoking status: Never Smoker     Smokeless tobacco: Never Used   Substance Use Topics     Alcohol use: Yes     Alcohol/week: 1.0 standard drinks     Types: 1 Glasses of wine per week     Family History   Problem Relation Age of Onset     Breast Cancer Maternal Grandmother      Breast Cancer Maternal Aunt          Current Outpatient Medications   Medication Sig Dispense Refill     ibuprofen (ADVIL/MOTRIN) 200 MG tablet Take 400 mg by mouth every 4 hours as needed for mild pain       Allergies   Allergen Reactions     Hydrocodone Nausea and Vomiting     Morphine Nausea and Vomiting     Tramadol Hives     Recent Labs   Lab Test 01/28/19  1459   *   HDL 82   TRIG 94   TSH 0.56      BP Readings from Last 3 Encounters:   10/26/19 109/69   01/28/19 118/66   03/06/18 127/79    Wt Readings from Last 3 Encounters:   10/26/19 63.5 kg (140 lb)   01/28/19 64.4 kg (142 lb)   03/06/18 65.8 kg (145 lb)                 Reviewed and updated as needed this visit by Provider      "    Review of Systems   ROS COMP: Constitutional, HEENT, cardiovascular, pulmonary, gi and gu systems are negative, except as otherwise noted.      Objective    /69   Pulse 68   Temp 97.2  F (36.2  C) (Oral)   Ht 1.6 m (5' 3\")   Wt 63.5 kg (140 lb)   BMI 24.80 kg/m    Body mass index is 24.8 kg/m .  Physical Exam   GENERAL: healthy, alert and no distress  EYES: Eyes grossly normal to inspection, PERRL and conjunctivae and sclerae normal  HENT: normal cephalic/atraumatic, ear canals and TM's normal, nose and mouth without ulcers or lesions, oropharynx clear, oral mucous membranes moist, about 2x2 cm right tonsillar gland swelling, localized, tender on palpation, slightly erythematous and warm on palpation, no cervical lymphadenopathy or enlarged thyroid gland  RESP: lungs clear to auscultation - no rales, rhonchi or wheezes  CV: regular rates and rhythm, normal S1 S2, no S3 or S4 and no murmur, click or rub  ABDOMEN: soft, nontender  MS: no gross musculoskeletal defects noted, no edema      Assessment & Plan     (R59.1) Lymphadenopathy  (primary encounter diagnosis)  Comment: Symptoms are likely secondary to right tonsillar lymphadenopathy, differential discussed in detail.  Shared decision made to start antibiotic for possible bacterial etiology.  Suggested warm compresses, well hydration, over-the-counter analgesia.  Will consider imaging if symptoms persist or worsen.  Written information provided.  Instructed to go ER if symptoms worsen over the weekend.  Patient understood and in agreement with above plan.  All questions answered.  Plan: amoxicillin-clavulanate (AUGMENTIN) 875-125 MG         tablet              Patient Instructions       Patient Education     Lymphadenopathy  Lymphadenopathy is swelling of the lymph nodes. Lymph nodes are small, bean-shaped glands around the body.  What are lymph nodes?  Lymph nodes are part of your immune system. These glands are found in your neck, over your " clavicle, armpits, groin, chest, and abdomen. They act as filters for lymph fluid as it flows through your body. Lymph fluid contains white blood cells (lymphocytes) that help the body fight infection and disease.   Why lymph nodes swell  Lymphadenopathy is very common. The glands often enlarge during a viral or bacterial infection. It can happen during a cold, the flu, or strep throat. The nodes may swell in just one area of the body, such as the neck (localized). Or nodes may swell all over the body (generalized). The neck (cervical) lymph nodes are the most common site of lymphadenopathy.  What causes lymphadenopathy?  Dead cells and fluid build up in the lymph nodes as they help fight infection or disease. This causes them to swell in size. Enlarged lymph nodes are often near the source of infection. This can help to find the cause of an infection. For example, swollen lymph nodes around the jaw may be because of an infection in the teeth or mouth. But lymphadenopathy may also be generalized. This is common in some viral illnesses such as infectious mononucleosis or chickenpox (varicella).  Lymphadenopathy can also be caused by:    Infection of a lymph node or small group of nodes (lymphadenitis)    Cancer    Reactions to medicines such as antibiotics and certain blood pressure, gout, and seizure medicines    Other health conditions, such as HIV infection, lupus, or sarcoidosis  Symptoms of lymphadenopathy  Lymphadenopathy can cause symptoms such as:    Lumps under the jaw, on the sides or back of the neck, in the armpits, in the groin, or in the chest or belly (abdomen)    Pain or tenderness in any of these areas    Redness or warmth in any of these areas  You may also have symptoms from an infection causing the swollen glands. These symptoms may include fever, sore throat, body aches, or cough.  Diagnosing lymphadenopathy  Your healthcare provider will ask about your health history and symptoms. He or she will  give you a physical exam and check the areas where lymph nodes are enlarged. Your healthcare provider will check the size and location of the nodes, and ask how long they have been swollen and if they are painful. Diagnostic tests and referral to specialists may be recommended. They may include:    Blood tests. These are done to check for signs of infection and other problems.    Urine test. This is also done to check for infection and other problems.    Chest X-ray, ultrasound, CT scan, or MRI scans. These tests can show enlarged lymph nodes or other problems.    Lymph node biopsy. If lymph nodes are swollen for 3 to 4 weeks, they may be checked with a biopsy. Small samples of lymph node tissue are taken and checked in a lab for signs of cancer. You may be referred to a specialist in blood disorders and cancer (hematologist and oncologist).  Treatment for lymphadenopathy  The treatment of enlarged lymph nodes depends on the cause. Enlarged lymph nodes are often harmless and go away without any treatment. Treatment is most often done on the cause of the enlarged nodes and may include:    Antibiotic medicine to treat a bacterial infection    Incision and drainage of a lymph node for lymphadenitis    Other medicines or procedures to treat the cause of the enlarged nodes  You may need follow-up exam in 3 to 4 weeks to recheck enlarged nodes.     When to call your healthcare provider  Call your healthcare provider if you have lymph nodes that are still swollen after 3 to 4 weeks, or as directed by your healthcare provider.   Date Last Reviewed: 5/1/2017 2000-2018 [employer]. [address] [place] [address], [place] [id number]. All rights reserved. This information is not intended as a substitute for professional medical care. Always follow your healthcare professional's instructions.               James Goodwin MD  [facility]

## 2019-10-28 ENCOUNTER — TRANSFERRED RECORDS (OUTPATIENT)
Dept: HEALTH INFORMATION MANAGEMENT | Facility: CLINIC | Age: 54
End: 2019-10-28

## 2019-10-28 ENCOUNTER — OFFICE VISIT (OUTPATIENT)
Dept: URGENT CARE | Facility: URGENT CARE | Age: 54
End: 2019-10-28
Payer: COMMERCIAL

## 2019-10-28 VITALS
BODY MASS INDEX: 24.53 KG/M2 | SYSTOLIC BLOOD PRESSURE: 126 MMHG | DIASTOLIC BLOOD PRESSURE: 62 MMHG | HEART RATE: 62 BPM | TEMPERATURE: 98.2 F | OXYGEN SATURATION: 98 % | RESPIRATION RATE: 14 BRPM | WEIGHT: 138.5 LBS

## 2019-10-28 DIAGNOSIS — K11.1 SALIVARY GLAND ENLARGEMENT: ICD-10-CM

## 2019-10-28 DIAGNOSIS — K11.7 DISTURBANCE OF SALIVARY SECRETION: Primary | ICD-10-CM

## 2019-10-28 PROCEDURE — 99214 OFFICE O/P EST MOD 30 MIN: CPT | Performed by: PHYSICIAN ASSISTANT

## 2019-10-28 NOTE — PROGRESS NOTES
SUBJECTIVE:   Ruth Amador is a 54 year old female presenting with a chief complaint of right side salivary gland swelling, tenderness and pain.  Onset of symptoms was 5 day(s) ago.  Course of illness is worsening.    Severity moderately severe  Current and Associated symptoms: right side facial swelling and tenderness  Treatment measures tried include augmentin.  Predisposing factors include none.    Past Medical History:   Diagnosis Date     Arthritis      Asthma      Bipolar 1 disorder (H)      Chlamydia      H/O cold sores      Thyroid disorder      Traumatic brain injury (H)         Allergies   Allergen Reactions     Hydrocodone Nausea and Vomiting     Morphine Nausea and Vomiting     Tramadol Hives     Family History   Problem Relation Age of Onset     Breast Cancer Maternal Grandmother      Breast Cancer Maternal Aunt        Social History     Tobacco Use     Smoking status: Never Smoker     Smokeless tobacco: Never Used   Substance Use Topics     Alcohol use: Yes     Alcohol/week: 1.0 standard drinks     Types: 1 Glasses of wine per week       ROS:  CONSTITUTIONAL:NEGATIVE for fever, chills, change in weight  INTEGUMENTARY/SKIN: POSITIVE for right side facial swelling   EYES: NEGATIVE for vision changes or irritation  ENT/MOUTH: POSITIVE for right side facial swelling and tenderness  NECK: POSITIVE for right side parotid gland swelling  RESP:NEGATIVE for significant cough or SOB  CV: NEGATIVE for chest pain, palpitations or peripheral edema  GI: NEGATIVE for nausea, abdominal pain, heartburn, or change in bowel habits  : normal menstrual cycles  MUSCULOSKELETAL: NEGATIVE for significant arthralgias or myalgia  NEURO: NEGATIVE for weakness, dizziness or paresthesias    OBJECTIVE  :/62   Pulse 62   Temp 98.2  F (36.8  C) (Oral)   Resp 14   Wt 62.8 kg (138 lb 8 oz)   SpO2 98%   BMI 24.53 kg/m    GENERAL APPEARANCE: healthy, alert and no distress  EYES: EOMI,  PERRL, conjunctiva clear  HENT: TM's  normal bilaterally and positive for right side parotid gland swelling, tendernes  NECK: POSITIVE for right side gland swelling, tenderness, pain  RESP: lungs clear to auscultation - no rales, rhonchi or wheezes  CV: regular rates and rhythm, normal S1 S2, no murmur noted  NEURO: Normal strength and tone, sensory exam grossly normal,  normal speech and mentation  SKIN: no suspicious lesions or rashes    ASSESSMENT/PLAN:      ICD-10-CM    1. Disturbance of salivary secretion K11.7 Nursing Communication 1     OTOLARYNGOLOGY REFERRAL   2. Salivary gland enlargement K11.1 OTOLARYNGOLOGY REFERRAL       Patient symptoms are worsening with treatment  Patient being referred today due to severity for ENT consultation

## 2020-02-04 ENCOUNTER — OFFICE VISIT (OUTPATIENT)
Dept: NEUROPSYCHOLOGY | Facility: CLINIC | Age: 55
End: 2020-02-04
Payer: COMMERCIAL

## 2020-02-04 DIAGNOSIS — R41.3 MEMORY LOSS: Primary | ICD-10-CM
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
## 2020-02-04 NOTE — NURSING NOTE
The patient was seen for neuropsychological evaluation at the request of Dr. Lang Clark, for the purposes of diagnostic clarification and treatment planning. 170 minutes of test administration and scoring were provided by this writer, Ye Menjivar. Please see Dr. Liliana Malloy's report for a full interpretation of the findings.
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
## 2020-02-25 NOTE — PROGRESS NOTES
Name: Ruth Amador MRN: [id number]  : 1965  RINCON: 2020  Staff: PRIYA Tech: TREV Age: 54  Sex: Female Hand: Right   Educ: 12  Occupation:    Vision:  ?with correction / ?without correction  Hearing:  ?with correction / ?without correction    WASI-II     Raw T     Vocabulary  41 53     Similarities  37 59     Block Design  56 69     Matrix Reasoning 17 49                       Full 4 IQ:  114     PIQ: 115          WAIS-IV     Raw SSa     Coding  86 15     Digit Span  38 16 RDS= 14    WRAT4   SS %ile Grade Equiv.     Word Reading  126 96 >12.9     WMS-IV   Raw SS     Info & Orientation 14       LM I   32 13     LM II   33 15     LM Recognition  29 >75th    DARIO-COMPLEX FIGURE TEST      Raw    T %ile     Time to Copy  86      >16     Copy    29     2-5     Short Delay Recall 18 50 50     Long Delay Recall 21 57 76     Recognition Total 22 59 82    [place] VERBAL LEARNING TEST - 2 Standard        Trial 1 2 3 4 5              5 11 11 13 11      Raw  SD      Trial 1-5 Total   51 52(T)     List A: Trial 1   5 -1     List A: Trial 5   11 -0.5     List B: Total   5 -0.5     Short Delay Free Recall  7 -1.5     Short Delay Cued Recall  11 -0.5     Long Delay Free Recall  11 0     Long Delay Cued Recall  13 0.5     % Recall from Primacy  29 0.5     % Recall from Middle  39 -1     % Recall from Recency  31 0.5     Across-Trial Consistency  80 0     Total Repetitions  4 0     Total Intrusions   2 -0.5     Total Recall Discrim  2.1 0     Recognition Hits  16 0.5     Recognition False Pos  2 0     Recognition Discrim  3.4 0.5    [place] NAMING TEST   Score: 59  T: 66                          [ 59    w/o cues        1   w/phonemic cues]     COWAT (FAS)   Raw: 55       z: 1.36    ANIMAL FLUENCY   Raw: 20     z: 0.05     CLOCK DRAWING     Command   3/3             Copy     3/3    TRAIL MAKING TEST    Raw         Err  z    A 24        0  1.05    B 56        0  0.91     STROOP   Raw +  Meredith  =      Total      T     Word 123 +   8 = 131 61    Color  81 +   4 = 85 53        Color/Word  45 +   5 = 50 55     WCST (128 cards)    Raw   T/%ile   Categories: 6 >16   % Persev. Err.: 6 62   %Concept. Resp: 87 61   FTMS:  0    FINGER TAPPING    Avg  z   RH  49.33 0.41    LH 43.33 -0.04      GROOVED PEGBOARD    Raw  Drops T   RH  49  1 78    LH 61  1 64     TOMM T1: 50  T2: 50 T3: N/A    MMPI-2-RF   RCd: 71 VRIN-r:  53   RC1: 70 SANJAY-r:  57F   RC2: 42 F-r:  79   RC3: 46 Fp-r:  68   RC4: 62 Fs:  58   RC6: 61 FBS-r:  83   RC7: 50 RBS:  67   RC8: 59 L-r:  52   RC9: 66 K-r:  48
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
## 2020-11-24 ENCOUNTER — OFFICE VISIT (OUTPATIENT)
Dept: OBGYN | Facility: CLINIC | Age: 55
End: 2020-11-24
Payer: COMMERCIAL

## 2020-11-24 VITALS
HEIGHT: 63 IN | BODY MASS INDEX: 25.16 KG/M2 | SYSTOLIC BLOOD PRESSURE: 120 MMHG | DIASTOLIC BLOOD PRESSURE: 70 MMHG | WEIGHT: 142 LBS

## 2020-11-24 DIAGNOSIS — Z01.419 ENCOUNTER FOR GYNECOLOGICAL EXAMINATION WITHOUT ABNORMAL FINDING: Primary | ICD-10-CM

## 2020-11-24 DIAGNOSIS — Z11.3 SCREEN FOR STD (SEXUALLY TRANSMITTED DISEASE): ICD-10-CM

## 2020-11-24 DIAGNOSIS — Z13.0 SCREENING FOR DISORDER OF BLOOD AND BLOOD-FORMING ORGANS: ICD-10-CM

## 2020-11-24 DIAGNOSIS — Z13.0 SCREENING, ANEMIA, DEFICIENCY, IRON: ICD-10-CM

## 2020-11-24 DIAGNOSIS — Z13.29 SCREENING FOR THYROID DISORDER: ICD-10-CM

## 2020-11-24 DIAGNOSIS — Z13.21 ENCOUNTER FOR VITAMIN DEFICIENCY SCREENING: ICD-10-CM

## 2020-11-24 LAB
ERYTHROCYTE [DISTWIDTH] IN BLOOD BY AUTOMATED COUNT: 12.8 % (ref 10–15)
FERRITIN SERPL-MCNC: 60 NG/ML (ref 8–252)
HCT VFR BLD AUTO: 44.2 % (ref 35–47)
HGB BLD-MCNC: 14.9 G/DL (ref 11.7–15.7)
MCH RBC QN AUTO: 30.6 PG (ref 26.5–33)
MCHC RBC AUTO-ENTMCNC: 33.7 G/DL (ref 31.5–36.5)
MCV RBC AUTO: 91 FL (ref 78–100)
PLATELET # BLD AUTO: 182 10E9/L (ref 150–450)
RBC # BLD AUTO: 4.87 10E12/L (ref 3.8–5.2)
TSH SERPL DL<=0.005 MIU/L-ACNC: 1.83 MU/L (ref 0.4–4)
WBC # BLD AUTO: 4.5 10E9/L (ref 4–11)

## 2020-11-24 PROCEDURE — 82306 VITAMIN D 25 HYDROXY: CPT | Performed by: OBSTETRICS & GYNECOLOGY

## 2020-11-24 PROCEDURE — 99396 PREV VISIT EST AGE 40-64: CPT | Performed by: OBSTETRICS & GYNECOLOGY

## 2020-11-24 PROCEDURE — 86695 HERPES SIMPLEX TYPE 1 TEST: CPT | Performed by: OBSTETRICS & GYNECOLOGY

## 2020-11-24 PROCEDURE — 86696 HERPES SIMPLEX TYPE 2 TEST: CPT | Performed by: OBSTETRICS & GYNECOLOGY

## 2020-11-24 PROCEDURE — 85027 COMPLETE CBC AUTOMATED: CPT | Performed by: OBSTETRICS & GYNECOLOGY

## 2020-11-24 PROCEDURE — 84443 ASSAY THYROID STIM HORMONE: CPT | Performed by: OBSTETRICS & GYNECOLOGY

## 2020-11-24 PROCEDURE — 36415 COLL VENOUS BLD VENIPUNCTURE: CPT | Performed by: OBSTETRICS & GYNECOLOGY

## 2020-11-24 PROCEDURE — 82728 ASSAY OF FERRITIN: CPT | Performed by: OBSTETRICS & GYNECOLOGY

## 2020-11-24 RX ORDER — SERTRALINE HYDROCHLORIDE 25 MG/1
TABLET, FILM COATED ORAL
COMMUNITY
Start: 2020-11-16 | End: 2021-07-13

## 2020-11-24 ASSESSMENT — MIFFLIN-ST. JEOR: SCORE: 1194.27

## 2020-11-25 ENCOUNTER — TELEPHONE (OUTPATIENT)
Dept: OBGYN | Facility: CLINIC | Age: 55
End: 2020-11-25

## 2020-11-25 LAB
DEPRECATED CALCIDIOL+CALCIFEROL SERPL-MC: 28 UG/L (ref 20–75)
HSV1 IGG SERPL QL IA: >8 AI (ref 0–0.8)
HSV2 IGG SERPL QL IA: <0.2 AI (ref 0–0.8)

## 2021-05-04 ENCOUNTER — HOSPITAL ENCOUNTER (EMERGENCY)
Facility: CLINIC | Age: 56
Discharge: HOME | End: 2021-05-04
Attending: EMERGENCY MEDICINE | Admitting: EMERGENCY MEDICINE
Payer: COMMERCIAL

## 2021-05-04 ENCOUNTER — APPOINTMENT (OUTPATIENT)
Dept: GENERAL RADIOLOGY | Facility: CLINIC | Age: 56
End: 2021-05-04
Attending: EMERGENCY MEDICINE
Payer: COMMERCIAL

## 2021-05-04 VITALS
RESPIRATION RATE: 18 BRPM | TEMPERATURE: 100.2 F | SYSTOLIC BLOOD PRESSURE: 100 MMHG | HEART RATE: 76 BPM | DIASTOLIC BLOOD PRESSURE: 77 MMHG | OXYGEN SATURATION: 95 %

## 2021-05-04 DIAGNOSIS — R05.9 COUGH: ICD-10-CM

## 2021-05-04 DIAGNOSIS — U07.1 2019 NOVEL CORONAVIRUS DISEASE (COVID-19): ICD-10-CM

## 2021-05-04 DIAGNOSIS — R06.02 SHORTNESS OF BREATH: ICD-10-CM

## 2021-05-04 LAB
ANION GAP SERPL CALCULATED.3IONS-SCNC: 2 MMOL/L (ref 3–14)
BASOPHILS # BLD AUTO: 0 10E9/L (ref 0–0.2)
BASOPHILS NFR BLD AUTO: 0.6 %
BUN SERPL-MCNC: 14 MG/DL (ref 7–30)
CALCIUM SERPL-MCNC: 8 MG/DL (ref 8.5–10.1)
CHLORIDE SERPL-SCNC: 107 MMOL/L (ref 94–109)
CO2 SERPL-SCNC: 29 MMOL/L (ref 20–32)
CREAT SERPL-MCNC: 0.73 MG/DL (ref 0.52–1.04)
D DIMER PPP FEU-MCNC: 0.5 UG/ML FEU (ref 0–0.5)
DIFFERENTIAL METHOD BLD: ABNORMAL
EOSINOPHIL # BLD AUTO: 0 10E9/L (ref 0–0.7)
EOSINOPHIL NFR BLD AUTO: 1.2 %
ERYTHROCYTE [DISTWIDTH] IN BLOOD BY AUTOMATED COUNT: 12 % (ref 10–15)
GFR SERPL CREATININE-BSD FRML MDRD: >90 ML/MIN/{1.73_M2}
GLUCOSE SERPL-MCNC: 100 MG/DL (ref 70–99)
HCT VFR BLD AUTO: 41.6 % (ref 35–47)
HGB BLD-MCNC: 14.1 G/DL (ref 11.7–15.7)
IMM GRANULOCYTES # BLD: 0 10E9/L (ref 0–0.4)
IMM GRANULOCYTES NFR BLD: 0.3 %
LYMPHOCYTES # BLD AUTO: 1.1 10E9/L (ref 0.8–5.3)
LYMPHOCYTES NFR BLD AUTO: 33.1 %
MCH RBC QN AUTO: 30.1 PG (ref 26.5–33)
MCHC RBC AUTO-ENTMCNC: 33.9 G/DL (ref 31.5–36.5)
MCV RBC AUTO: 89 FL (ref 78–100)
MONOCYTES # BLD AUTO: 0.5 10E9/L (ref 0–1.3)
MONOCYTES NFR BLD AUTO: 14.2 %
NEUTROPHILS # BLD AUTO: 1.7 10E9/L (ref 1.6–8.3)
NEUTROPHILS NFR BLD AUTO: 50.6 %
NRBC # BLD AUTO: 0 10*3/UL
NRBC BLD AUTO-RTO: 0 /100
NT-PROBNP SERPL-MCNC: 213 PG/ML (ref 0–900)
PLATELET # BLD AUTO: 124 10E9/L (ref 150–450)
POTASSIUM SERPL-SCNC: 3.7 MMOL/L (ref 3.4–5.3)
RBC # BLD AUTO: 4.68 10E12/L (ref 3.8–5.2)
SODIUM SERPL-SCNC: 138 MMOL/L (ref 133–144)
TROPONIN I SERPL-MCNC: <0.015 UG/L (ref 0–0.04)
TROPONIN I SERPL-MCNC: <0.015 UG/L (ref 0–0.04)
WBC # BLD AUTO: 3.4 10E9/L (ref 4–11)

## 2021-05-04 PROCEDURE — 99284 EMERGENCY DEPT VISIT MOD MDM: CPT | Mod: 25

## 2021-05-04 PROCEDURE — 80048 BASIC METABOLIC PNL TOTAL CA: CPT | Performed by: EMERGENCY MEDICINE

## 2021-05-04 PROCEDURE — 83880 ASSAY OF NATRIURETIC PEPTIDE: CPT | Performed by: EMERGENCY MEDICINE

## 2021-05-04 PROCEDURE — 84484 ASSAY OF TROPONIN QUANT: CPT | Mod: 91 | Performed by: EMERGENCY MEDICINE

## 2021-05-04 PROCEDURE — 85379 FIBRIN DEGRADATION QUANT: CPT | Performed by: EMERGENCY MEDICINE

## 2021-05-04 PROCEDURE — 71045 X-RAY EXAM CHEST 1 VIEW: CPT

## 2021-05-04 PROCEDURE — 85025 COMPLETE CBC W/AUTO DIFF WBC: CPT | Performed by: EMERGENCY MEDICINE

## 2021-05-04 RX ORDER — ALBUTEROL SULFATE 90 UG/1
2 AEROSOL, METERED RESPIRATORY (INHALATION) EVERY 4 HOURS PRN
Qty: 8 G | Refills: 1 | Status: SHIPPED | OUTPATIENT
Start: 2021-05-04 | End: 2021-07-12

## 2021-05-04 NOTE — ED PROVIDER NOTES
History     Chief Complaint:  Shortness of Breath and positive Covid test     HPI   Ruth Johnston is a 55 year old female with a history of asthma, TBI who presents with shortness of breath.  She was diagnosed with Covid about a week ago and since then has had a continuous dry cough that is leading to rib pain/discomfort.  She also notes increasing shortness of breath.  She also notes some dizziness that is similar to when she had her TBI several years ago which includes lightheadedness as well as feeling like she is going to fall to one side.  She has had low appetite but has been trying to push liquids.  She denies abdominal pain.  She did note a fever this morning up to 101.    Covid virus by PCR 4/28/2021: positive (A)     Review of Systems  Positive-cough, shortness of breath, lightheadedness, dizziness, low appetite  Negative-abdominal pain, nausea, vomiting, diarrhea, constipation  Remaining 10 point ROS negative    Allergies:  Hydrocodone  Morphine  Tramadol  Prednisone   Propofol     Medications:    Zoloft     Past Medical History:    Arthritis   Asthma   Bipolar disorder   TBI   Thyroid disorder    PTSD   Generalized anxiety disorder     Past Surgical History:    Tonsillectomy and adenoidectomy   Clavicle ORIF    Strabismus surgery  D&C  Bunionectomy      Family History:    The patient denies past family history.    Social History:  Presents alone     Physical Exam     Patient Vitals for the past 24 hrs:   BP Temp Temp src Pulse Resp SpO2   05/04/21 1900 -- -- -- 76 18 95 %   05/04/21 1830 100/77 -- -- 87 -- --   05/04/21 1800 109/58 -- -- 71 18 94 %   05/04/21 1745 -- -- -- -- -- 96 %   05/04/21 1733 -- 100.2  F (37.9  C) Oral -- -- --   05/04/21 1700 99/64 -- -- 67 16 95 %   05/04/21 1607 100/56 -- -- 69 18 98 %   05/04/21 1515 99/60 -- -- 69 -- 96 %   05/04/21 1502 -- -- -- -- -- 97 %   05/04/21 1501 100/53 -- -- 69 -- --   05/04/21 1253 101/49 98.3  F (36.8  C) Temporal 81 20 98 %        Physical Exam  General/Appearance: appears stated age, well-groomed, appears comfortable  Eyes: EOMI, no scleral injection, no icterus  ENT: MMM  Neck: supple, nl ROM, no stiffness  Cardiovascular: RRR, nl S1S2, no m/r/g, 2+ pulses in all 4 extremities, cap refill <2sec  Respiratory: CTAB, good air movement throughout, no wheezes/rhonchi/rales, no increased WOB, no retractions, frequent dry cough  MSK: COTTON, good tone, no bony abnormality  Skin: warm and well-perfused, no rash, no edema, no ecchymosis, nl turgor  Neuro: GCS 15, alert and oriented, no gross focal neuro deficits  Psych: interacts appropriately  Heme: no petechia, no purpura, no active bleeding    Emergency Department Course     Imaging:    Chest XR:  Left clavicle orthopedic fixation hardware. Minimal   bibasilar opacities favored to reflect atelectasis or scarring, right   greater than left. No focal pneumonic consolidation or pleural   effusion. Normal heart size. Old healed left rib fracture.   Reading per radiology    Laboratory:    CBC: WBC 3.4 (L), HGB 14.1, (L)     BMP: anion gap 2 (L) glucose 100 (H) Ca 8.0 (L) o/w WNL (Creatinine 0.73)     BNP: 213     Troponin (Collected 1505): <0.015    Troponin (Collected 1732): <0.015    D dimer: 0.5     Procedures:    Emergency Department Course:    Reviewed:  I reviewed nursing notes, vitals, past history and care everywhere    Assessments:  1450 I obtained history and examined the patient as noted above.     1917 I rechecked the patient and explained findings prior to discharge.     Disposition:  The patient was discharged to home.    Impression & Plan      Medical Decision Making:  This patient is a pleasant 55-year-old female who was recently diagnosed with Covid who presents with increasing shortness of breath, lightheadedness, generalized fatigue.  She is continuing to have fevers and a dry cough.  I suspect her symptoms are all secondary to Covid as we did quite a detailed work-up for  other etiologies and all tests came back is unremarkable.  I considered that she may have a pneumonia but chest x-ray was unremarkable and she has a normal white count.  I considered PE but D-dimer is unremarkable.  I considered atypical presentation of ACS however serial troponins and her EKG were unremarkable.  Her cough with history of asthma make me think that she may benefit from an inhaler so this is been prescribed.  Ultimately she is satting well, not tachypneic, not with other vital sign abnormalities that warrant admission.  I think is reasonable for her to be discharged home.    Covid-19  Ruth Johnston was evaluated during a global COVID-19 pandemic, which necessitated consideration that the patient might be at risk for infection with the SARS-CoV-2 virus that causes COVID-19.   Applicable protocols for evaluation were followed during the patient's care.   COVID-19 was considered as part of the patient's evaluation. The plan for testing is:  a test was obtained at a previous visit and reviewed & considered today.    Diagnosis:    ICD-10-CM    1. 2019 novel coronavirus disease (COVID-19)  U07.1    2. Cough  R05    3. Shortness of breath  R06.02      Discharge Medications:  New Prescriptions    ALBUTEROL (PROAIR HFA) 108 (90 BASE) MCG/ACT INHALER    Inhale 2 puffs into the lungs every 4 hours as needed for shortness of breath / dyspnea     Scribe Disclosure:  I, Kaleigh Garcia, am serving as a scribe at 2:48 PM on 5/4/2021 to document services personally performed by Estrella Miranda MD based on my observations and the provider's statements to me.      Estrella Miranda MD  05/04/21 1940

## 2021-07-12 ENCOUNTER — HOSPITAL ENCOUNTER (OUTPATIENT)
Facility: CLINIC | Age: 56
Setting detail: OBSERVATION
Discharge: HOME | End: 2021-07-14
Attending: PHYSICIAN ASSISTANT | Admitting: PSYCHIATRY & NEUROLOGY
Payer: COMMERCIAL

## 2021-07-12 DIAGNOSIS — Z86.59 HISTORY OF DEPRESSION: ICD-10-CM

## 2021-07-12 DIAGNOSIS — F43.10 POSTTRAUMATIC STRESS DISORDER: ICD-10-CM

## 2021-07-12 DIAGNOSIS — F33.1 MAJOR DEPRESSIVE DISORDER, RECURRENT EPISODE, MODERATE (H): Primary | ICD-10-CM

## 2021-07-12 DIAGNOSIS — R45.851 SUICIDAL IDEATION: ICD-10-CM

## 2021-07-12 LAB — SARS-COV-2 RNA RESP QL NAA+PROBE: NEGATIVE

## 2021-07-12 PROCEDURE — 90791 PSYCH DIAGNOSTIC EVALUATION: CPT

## 2021-07-12 PROCEDURE — 250N000013 HC RX MED GY IP 250 OP 250 PS 637: Performed by: PSYCHIATRY & NEUROLOGY

## 2021-07-12 PROCEDURE — 99285 EMERGENCY DEPT VISIT HI MDM: CPT | Mod: 25

## 2021-07-12 PROCEDURE — G0378 HOSPITAL OBSERVATION PER HR: HCPCS

## 2021-07-12 PROCEDURE — 250N000013 HC RX MED GY IP 250 OP 250 PS 637: Performed by: PHYSICIAN ASSISTANT

## 2021-07-12 PROCEDURE — C9803 HOPD COVID-19 SPEC COLLECT: HCPCS

## 2021-07-12 PROCEDURE — 87635 SARS-COV-2 COVID-19 AMP PRB: CPT | Performed by: PHYSICIAN ASSISTANT

## 2021-07-12 PROCEDURE — 99220 PR INITIAL OBSERVATION CARE,LEVEL III: CPT | Performed by: PSYCHIATRY & NEUROLOGY

## 2021-07-12 RX ORDER — ACETAMINOPHEN 500 MG
1000 TABLET ORAL ONCE
Status: COMPLETED | OUTPATIENT
Start: 2021-07-12 | End: 2021-07-12

## 2021-07-12 RX ORDER — HYDROXYZINE HYDROCHLORIDE 25 MG/1
25 TABLET, FILM COATED ORAL
Status: DISCONTINUED | OUTPATIENT
Start: 2021-07-12 | End: 2021-07-14 | Stop reason: HOSPADM

## 2021-07-12 RX ADMIN — HYDROXYZINE HYDROCHLORIDE 25 MG: 25 TABLET ORAL at 20:18

## 2021-07-12 RX ADMIN — ACETAMINOPHEN 1000 MG: 500 TABLET, FILM COATED ORAL at 17:10

## 2021-07-12 ASSESSMENT — ACTIVITIES OF DAILY LIVING (ADL): DRESS: SCRUBS (BEHAVIORAL HEALTH)

## 2021-07-12 ASSESSMENT — ENCOUNTER SYMPTOMS
HALLUCINATIONS: 0
COUGH: 0
DYSPHORIC MOOD: 1
SHORTNESS OF BREATH: 0
FEVER: 0
CHILLS: 0
NERVOUS/ANXIOUS: 1

## 2021-07-12 ASSESSMENT — MIFFLIN-ST. JEOR
SCORE: 1144.71
SCORE: 1143.81

## 2021-07-12 NOTE — PROGRESS NOTES
55 year old female with history of depression and a traumatic brain injury received from ED due to suicidal thoughts. Reports that she is going through a divorce and has been feeling more down to the point of not wanting to live anymore. Endorses SI. Nursing and risk assessments completed. Assessments reviewed with LMHP and physician. Video monitoring in progress, patient informed.  Admission information reviewed with patient. Patient given a tour of [facility] and instructions on using the facility. Questions regarding [facility] addressed. Pt search completed and belongings inventoried.

## 2021-07-12 NOTE — ED TRIAGE NOTES
"Pt is having suicidal thoughts lately. Going through divorce and has hx of brain injury 4 years ago and having a hard time dealing with things. \"I just don't want to be here'. Does not have a plan.   "

## 2021-07-12 NOTE — Clinical Note
Ruth Johnston was seen and treated in our emergency department on 7/12/2021.  She may return to work on 08/01/2021.       If you have any questions or concerns, please don't hesitate to call.      Neal Plascencia MD

## 2021-07-12 NOTE — ED PROVIDER NOTES
Emergency Department Attending Supervision Note  7/12/2021  5:00 PM      I evaluated this patient in conjunction with Becki Grimaldo PA-C      Briefly, the patient presented with  suicidal ideation. For the past few months, the patient reports that she has not been taking her Sertraline for her depression. Over the past few two months, she reports that she has been experiencing intermittent episodes of depression and suicidal ideation. Over the past few days, she reports that her symptoms have worsened, prompting her to present to the emergency department.       On my exam,   Vitals: reviewed by me  General: Pt seen on [facility], pleasant, cooperative, and alert to conversation  Eyes: Tracking well, clear conjunctiva BL  ENT: MMM, midline trachea.   Lungs: No tachypnea, no accessory muscle use. No respiratory distress.   CV: Rate as above  MSK: no joint effusion.  No evidence of trauma  Skin: No rash  Neuro: Clear speech and no facial droop.  Psych: Not RIS, no e/o AH/VH.  Endorses passive suicidal ideation and depression.    ED course:    Ruth Johnston is a 55 year old female who presents to the ED with what appears to be passive suicidal ideation. At this time she tells me she has no plans, and is not sure she wants to kill herself, but did seem to have active SI previously. She has a counselor and is going through some significant life stresses at this time. I do think she would benefit from [facility], and she is medically cleared at this time. Her COVID test is negative. Will plan for transfer to [facility] as above.         Diagnosis    ICD-10-CM    1. Suicidal ideation  R45.851           George Leon MD, MD  07/12/21 2133

## 2021-07-12 NOTE — ED PROVIDER NOTES
"  History   Chief Complaint:  Suicidal Ideation       HPI   Ruth Johnston is a 55 year old female with history of depression, PTSD, and TBI who presents with suicidal ideation. For the past few months, the patient reports that she has not been taking her Sertraline for her depression. Over the past few two months, she reports that she has been experiencing intermittent episodes of depression and suicidal ideation. Over the past few days, she reports that her symptoms have worsened, prompting her to present to the emergency department. She denies any fever, chills, cough, chest pain, shortness of breath, homicidal ideation, hallucinations, or any other symptoms.    Review of Systems   Constitutional: Negative for chills and fever.   Respiratory: Negative for cough and shortness of breath.    Cardiovascular: Negative for chest pain.   Psychiatric/Behavioral: Positive for dysphoric mood and suicidal ideas. Negative for hallucinations.   All other systems reviewed and are negative.    Allergies:  Hydrocodone  Morphine  Tramadol  Prednisone   Propofol    Medications:  Albuterol  Zoloft    Past Medical History:    Anxiety  Depression   Arthritis   Asthma   Bipolar disorder   TBI   Thyroid disorder    PTSD      Past Surgical History:    Strabismus surgery  Tonsillectomy and adenoidectomy   Dilation and curettage   Bunionectomy   Clavicle ORIF    Social History:  Drug use: Negative  Presents to the ED alone.    Physical Exam     Patient Vitals for the past 24 hrs:   BP Temp Temp src Pulse Resp SpO2 Height Weight   07/12/21 1406 (!) 141/79 97.1  F (36.2  C) Temporal 72 18 96 % 1.6 m (5' 3\") 59 kg (130 lb)       Physical Exam  Vitals signs and nursing note reviewed.   HENT:      Nose: Nose normal. No congestion or rhinorrhea.      Mouth/Throat:   Eyes:      General: No scleral icterus.     Extraocular Movements: Extraocular movements intact.   Cardiovascular:      Rate and Rhythm: Regular rhythm. Normal Rate.     " Pulses: Normal pulses.      Heart sounds: Normal heart sounds.   Pulmonary:      Effort: Pulmonary effort is normal.      Breath sounds: Normal breath sounds.   Abdominal:      General: Abdomen is flat. Bowel sounds are normal.      Palpations: Abdomen is soft.      Tenderness: There is no abdominal tenderness.   Musculoskeletal: Observed moving bilateral upper extremities.  Skin:     General: Skin is warm and dry.   Neurological:      Mental Status: Alert. Speech normal. Responds appropriately to questions.   Psychiatric:         Mood and Affect: Mood normal.         Behavior: Behavior normal.     Emergency Department Course     Laboratory:    Asymptomatic COVID19 Virus PCR by nasopharyngeal swab: Negative    Emergency Department Course:    Reviewed:  I reviewed nursing notes, vitals, past medical history and care everywhere.    Assessments:  1415 I obtained history and examined the patient as noted above.     1653 I staffed this patient with Dr. Mcknight who evaluated the patient independently.     Interventions:  1710 Tylenol 1,000 mg PO    Disposition:  The patient was transferred to [facility].       Impression & Plan     Medical Decision Making:  Ruth Johnston is a 55-year-old female who presents to the emergency department for evaluation of suicidal ideations.  As noted above, the patient notes intermittent episodes of depression and passive suicidal ideations for the past 2 months however the past few days her symptoms increase in severity prompting her visit to the emergency department.  Patient denied any homicidal ideations or visual/auditory hallucinations.  Given reported symptoms, the patient will be transferred to [facility] further evaluation.  Patient is currently voluntary and denied any drug or alcohol abuse.  Covid negative.  All questions and concerns were addressed prior to transfer to [facility].     Covid-19  Ruth Johnston was evaluated during a global COVID-19 pandemic, which necessitated  consideration that the patient might be at risk for infection with the SARS-CoV-2 virus that causes COVID-19.   Applicable protocols for evaluation were followed during the patient's care.   COVID-19 was considered as part of the patient's evaluation. The plan for testing is:  a test was obtained during this visit.    Diagnosis:    ICD-10-CM    1. Suicidal ideation  R45.851        Scribe Disclosure:  I, Earnest Odonnell, am serving as a scribe at 2:12 PM on 7/12/2021 to document services personally performed by Becki Grimaldo PA-C based on my observations and the provider's statements to me.              Becki Grimaldo PA-C  07/12/21 6551

## 2021-07-13 ENCOUNTER — TELEPHONE (OUTPATIENT)
Dept: BEHAVIORAL HEALTH | Facility: CLINIC | Age: 56
End: 2021-07-13

## 2021-07-13 PROCEDURE — 250N000013 HC RX MED GY IP 250 OP 250 PS 637: Performed by: PSYCHIATRY & NEUROLOGY

## 2021-07-13 PROCEDURE — 99226 PR SUBSEQUENT OBSERVATION CARE,LEVEL III: CPT | Performed by: PSYCHIATRY & NEUROLOGY

## 2021-07-13 PROCEDURE — G0378 HOSPITAL OBSERVATION PER HR: HCPCS

## 2021-07-13 RX ORDER — SERTRALINE HYDROCHLORIDE 25 MG/1
25 TABLET, FILM COATED ORAL DAILY
Status: DISCONTINUED | OUTPATIENT
Start: 2021-07-13 | End: 2021-07-14 | Stop reason: HOSPADM

## 2021-07-13 RX ORDER — SERTRALINE HYDROCHLORIDE 25 MG/1
25 TABLET, FILM COATED ORAL DAILY
Qty: 30 TABLET | Refills: 0 | Status: SHIPPED | OUTPATIENT
Start: 2021-07-13 | End: 2022-06-03 | Stop reason: DRUGHIGH

## 2021-07-13 RX ADMIN — HYDROXYZINE HYDROCHLORIDE 25 MG: 25 TABLET ORAL at 20:43

## 2021-07-13 RX ADMIN — HYDROXYZINE HYDROCHLORIDE 25 MG: 25 TABLET ORAL at 19:31

## 2021-07-13 RX ADMIN — SERTRALINE HYDROCHLORIDE 25 MG: 25 TABLET ORAL at 11:10

## 2021-07-13 ASSESSMENT — ACTIVITIES OF DAILY LIVING (ADL)
ORAL_HYGIENE: INDEPENDENT
HYGIENE/GROOMING: HANDWASHING
DRESS: SCRUBS (BEHAVIORAL HEALTH)
DRESS: SCRUBS (BEHAVIORAL HEALTH)

## 2021-07-13 NOTE — ED NOTES
[facility] Treatment Plan    Client's Name: Ruth Johnston  YOB: 1965    DSM-5 Diagnoses: F33.1 Major Depressive Disorder, F43.10 Post Traumatic Stress Disorder    Psychosocial / Contextual Factors: Patient presented to the [facility] unit with the following concerns:  Pt came to the emergency department due to struggling to cope with her recent experience of the dissolution of her marriage. She reported a high level of stress and experiencing depression symptoms and suicidal ideation. She denied a plan or intent of harming herself. She also has a history of a TBI which can impact or exacerbate symptoms.      Anticipated number of sessions or this episode of care: 1-2    MeasurableTreatment Goal(s) related to diagnosis / functional impairment(s)    Goal 1:  Alleviate SI/stabilize the suicidal crisis?      Objective: Identify life factors/triggers that preceded the SI?      Patient will: express feelings related to SI in order to explore factors/triggers?      ?LMHP will: assist patient in becoming aware of life factors and triggers that may have been?      ?precursors to SI?      Status: New as of July 13,2021    Objective: participate in therapy session around emotional issues resulting in suicidal thoughts?      ?Patient will: discuss feelings and emotional sources of stress, hopelessness?      ?LMHP will: encourage pt to express feelings and emotions?    Status: New as of July 13,2021  ?      Goal 2:  Patient will increase awareness of depression symptoms and their impact on functioning and develop skills to reduce negative impact.      Objective #A       Patient will describe thoughts, feelings, and actions associated with depression.       Intervention(s)      LMHP will explore and process with patient how depression has impacted them.    Status: New as of July 13, 2021    Objective #B      Patient will increase depression coping skills.      Intervention(s)      LMHP will teach CBT skills and  model their use.    Status: New as of July 13,2021     Appearance:   Appropriate    Eye Contact:   Good    Psychomotor Behavior: Normal    Attitude:   Cooperative    Orientation:   All   Speech    Rate / Production: Normal/ Responsive Normal     Volume:  Normal    Mood:    Sad    Affect:    Appropriate  tearful    Thought Content:  Clear    Thought Form:  Coherent  Flight of Ideas  Logical    Insight:    Good           PLAN:   Patient will board in [facility] until patient and treatment deem it appropriate to either discharge or admit to a higher level of care. Details: [facility] with [facility] crisis and be able to emotionally stable.      Karol Beltre, [facility]                                                           ________

## 2021-07-13 NOTE — ED NOTES
[facility] Reassessment and Progress Note    Client Name: Ruth Johnston  Date: July 13, 2021       Presenting issue that brought patient to the emPATH unit:   Pt presents with concerns relating to coping with stress and recent confirmation of the disillusion of her marriage. She has had recent suicidal thoughts due to her current crisis and has been struggling with coping with her emotions.        Current presentation on the unit:   Pt states she is feeling a little bit better but is still struggling with feeling emotional and overwhelmed.   Current risk to self or others? Yes reports continuing to have passive suicidal ideation. Denies intent or plan.    Summary of therapeutic interventions completed with patient:   Focused on supporting pt in expressing and exploring her emotions. Supported pt in making the best decisions for herself and focusing on her needs at the moment. Provided validation and helped pt process her current thoughts and explored future goals and plan for the future. Pt decided to stay another night observation at emPATH unit in order to continue getting support and regaining coping skills. Pt is still overwhelmed by emotions and struggling with feeling emotionally stable.    Treatment objectives addressed in this session:   Focused on addressing depressive thoughts and suicidal ideation.     Progress on treatment goals:   Pt is reporting a reduction of suicidal thoughts and is feeling more hopeful, but is still coping with a significant amount of emotions and feeling overwhelmed.    Additional collateral information:     No other collateral gathered at this time.     Mental Status:     Appearance:   Appropriate    Eye Contact:   Good    Psychomotor Behavior: Normal    Attitude:   Cooperative    Orientation:   All   Speech    Rate / Production: Normal/ Responsive    Volume:  Normal    Mood:    Sad    Affect:    Tearful   Thought Content:  Clear    Thought Form:  Goal Directed  Logical     Insight:    Good       Plan:   Pt will stay at the emPATH unit another night for observation and more therapeutic support with plan to discharge home with scheduled intake for day treatment.    Disposition: Other: final disposition still being determined as pt will be staying one more night on observation.    Rationale for disposition:   Pt is continuing to struggle with feeling emotionally overwhelmed and needing support to cope with crisis.    Reviewed assessment with attending provider: Yes    Total time spent with patient:.50 hrs     CPT code: 76749 - Psychotherapy (with patient) - 30 (16-37*) min      Karol Beltre [facility]

## 2021-07-13 NOTE — PLAN OF CARE
Pt woke briefly and had a snack. She is calm, pleasant, and cooperative. She reports being sad and depressed and her anxiety was better at this time. She denies SI/HI, or hallucinations. Very thankful to staff. Asleep now.

## 2021-07-13 NOTE — TELEPHONE ENCOUNTER
Hospital staff called to schedule an eval for IOP programming.  Scheduled 7/21 by video.  Referral created, yasmeen requested

## 2021-07-13 NOTE — ED NOTES
Collateral:  Pierce, [phone]    This writer spoke with Pierce, he has taken care of the pt's dog and has removed the gun from her home.     Arlin Julien, ALEXSW

## 2021-07-13 NOTE — ED PROVIDER NOTES
ED Observation [facility]iatric [facility] Emergency Department - EmPATH Unit  Observation Initiation Date: Jul 12, 2021    Ruth Johnston MRN: [id number]   Age: 55 year old YOB: 1965     History     Chief Complaint   Patient presents with     Suicidal     The history is provided by the patient and medical records.     Ruth Johnston is a 55 year old female with PMH notable for TBI, depression and PTSD, [facility] with suicidal thoughts and worsening symptoms.  She is going through a difficult divorce which is causing more stress. She lives alone.  She was on zoloft 25 mg once a day which was helpful but this spring she got Covid and could not keep anything down.  She stopped it then and never got back on it.  She has an individual therapist.  She has no specific plan but feels uncomfortable to go home tonight.  She is worried about being alone and how sometimes the suicidal thoughts get overwhelming.      Past Medical History  Past Medical History:   Diagnosis Date     Arthritis      Asthma      Bipolar 1 disorder (H)      Chlamydia      H/O cold sores      Thyroid disorder      Traumatic brain injury (H)      Past Surgical History:   Procedure Laterality Date     ------------OTHER-------------  04/2017    clavicle shattered     STRABISMUS SURGERY  2012     TONSILLECTOMY      as a child     ibuprofen (ADVIL/MOTRIN) 200 MG tablet  Multiple Vitamin (MULTI-VITAMIN DAILY PO)  sertraline (ZOLOFT) 25 MG tablet      Allergies   Allergen Reactions     Hydrocodone Nausea and Vomiting     Morphine Nausea and Vomiting     Tramadol Hives     Family History  Family History   Problem Relation Age of Onset     Breast Cancer Maternal Grandmother      Breast Cancer Maternal Aunt      No Known Problems Mother      No Known Problems Father      No Known Problems Sister      No Known Problems Brother      No Known Problems Maternal Grandfather      No Known Problems Paternal  "Grandmother      No Known Problems Other      Social History   Social History     Tobacco Use     Smoking status: Never Smoker     Smokeless tobacco: Never Used   Substance Use Topics     Alcohol use: Yes     Alcohol/week: 1.0 standard drinks     Types: 1 Glasses of wine per week     Drug use: Yes     Comment: edibles      Past medical history, past surgical history, medications, allergies, family history, and social history were reviewed with the patient. No additional pertinent items.       Review of Systems   Psychiatric/Behavioral: Positive for dysphoric mood and suicidal ideas. Negative for hallucinations and self-injury. The patient is nervous/anxious.    All other systems reviewed and are negative.    A complete review of systems was performed with pertinent positives and negatives noted in the HPI, and all other systems negative.    Physical Examination   BP: (!) 141/79  Pulse: 72  Temp: 97.1  F (36.2  C)  Resp: 18  Height: 160 cm (5' 3\")  Weight: 59 kg (130 lb)  SpO2: 96 %    Physical Exam  General:  Appears stated age.   Neuro: alert and fully oriented.    Integumentary/Skin: no rash visualized, normal color    Psychiatric Examination   Appearance: awake, alert  Attitude:  cooperative  Eye Contact:  good  Mood:  depressed  Affect:  mood congruent  Speech:  clear, coherent  Psychomotor Behavior:  no evidence of tardive dyskinesia, dystonia, or tics  Throught Process:  goal oriented  Associations:  no loose associations  Thought Content:  no evidence of psychotic thought and passive suicidal ideation present  Insight:  fair  Judgement:  fair  Oriented to:  time, person, and place  Attention Span and Concentration:  intact  Recent and Remote Memory:  intact    ED Course        Labs Ordered and Resulted from Time of ED Arrival Up to the Time of Departure from the ED   SARS-COV2 (COVID-19) VIRUS RT-PCR - Normal    Narrative:     Testing was performed using the callie  SARS-CoV-2 & Influenza A/B Assay on the " callie  Lenora  System.  This test should be ordered for the detection of SARS-COV-2 in individuals who meet SARS-CoV-2 clinical and/or epidemiological criteria. Test performance is unknown in asymptomatic patients.  This test is for in vitro diagnostic use under the FDA EUA for laboratories certified under CLIA to perform moderate and/or high complexity testing. This test has not been FDA cleared or approved.  A negative test does not rule out the presence of PCR inhibitors in the specimen or target RNA in concentration below the limit of detection for the assay. The possibility of a false negative should be considered if the patient's recent exposure or clinical presentation suggests COVID-19.  [facility] are certified under the Clinical Laboratory Improvement Amendments of 1988 (CLIA-88) as qualified to perform moderate and/or high complexity laboratory testing.   DRUG ABUSE SCREEN 77 URINE (FL, RH, SH)   COVID-19 VIRUS (CORONAVIRUS) BY PCR    Narrative:     The following orders were created for panel order Asymptomatic COVID-19 Virus (Coronavirus) by PCR Nasopharyngeal.  Procedure                               Abnormality         Status                     ---------                               -----------         ------                     SARS-COV2 (COVID-19) Vir...[[id number]]  Normal              Final result                 Please view results for these tests on the individual orders.       Assessments & Plan (with Medical Decision Making)   Patient presenting with suicidal thoughts with increased stressors. Nursing notes reviewed.     I have reviewed the DEC assessment dated 7/12/21.        The patient was found to have a psychiatric condition that would benefit from an observation stay in the emergency department for further psychiatric stabilization and/or coordination of a safe disposition. The observation plan includes serial assessments of psychiatric condition, potential  administration of medications if indicated, further disposition pending the patient's psychiatric course during the monitoring period.     Preliminary diagnosis:  PTSD  Depression    Plan:  Harvey will stay in observation status in the EmPATH  She will get hydroxyzine 25-50 mg for bedtime  She will restart zoloft 25 mg tomorrow morning (as she would take the medication in the mornings)  Most likely getting day treatment set up and then being discharged tomorrow pending further assessment.    --  Carlton Sumner MD   [facility] EMERGENCY DEPT  EmPATH Unit  7/12/2021        Carlton Sumner MD  07/12/21 2038

## 2021-07-13 NOTE — ED NOTES
[facility] ED Note    Called and provided outpatient intake with pt's email address for day treatment intake scheduled 7/21/21 at 10:30 am. Email is [employer].    Karol Beltre LPCC

## 2021-07-13 NOTE — ED PROVIDER NOTES
EmPATH Unit - Psychiatric Consultation  [facility] Emergency Department    Ruth Johnston MRN: [id number]   Age: 55 year old YOB: 1965     History     Chief Complaint   Patient presents with     Suicidal     HPI  Ruth Johnston is a 55 year old female with history notable for PTSD, MDD, and a TBI who presents to the empath unit reporting depressed mood and suicidal thoughts.  She initially met with Dr. Sumner who entered the patient into observation status while noting psychosocial stressors that were contributing to her decompensation.  It was also noted that the patient had been off of Zoloft for some time, a medication that was previously quite helpful for her.  She is being reassessed today.  Overnight, there were no acute issues.  On examination, the patient continues to report depressed mood although the intensity is gradually improving.  She finds the environment on the unit to be supportive and is aiding in her gradual recovery.  She is agreeable to restart Zoloft to aid in treating her mood and anxiety disorder.  She expressed hope to gain remission of symptoms and was willing to engage in intensive outpatient programming.  She denied suicidal or homicidal thoughts today.  No psychotic symptoms endorsed.    Past Medical History  Past Medical History:   Diagnosis Date     Arthritis      Asthma      Bipolar 1 disorder (H)      Chlamydia      H/O cold sores      Thyroid disorder      Traumatic brain injury (H)      Past Surgical History:   Procedure Laterality Date     ------------OTHER-------------  04/2017    clavicle shattered     STRABISMUS SURGERY  2012     TONSILLECTOMY      as a child     sertraline (ZOLOFT) 25 MG tablet  ibuprofen (ADVIL/MOTRIN) 200 MG tablet  Multiple Vitamin (MULTI-VITAMIN DAILY PO)      Allergies   Allergen Reactions     Hydrocodone Nausea and Vomiting     Morphine Nausea and Vomiting     Tramadol Hives     Family History  Family History   Problem Relation  "Age of Onset     Breast Cancer Maternal Grandmother      Breast Cancer Maternal Aunt      No Known Problems Mother      No Known Problems Father      No Known Problems Sister      No Known Problems Brother      No Known Problems Maternal Grandfather      No Known Problems Paternal Grandmother      No Known Problems Other      Social History   Social History     Tobacco Use     Smoking status: Never Smoker     Smokeless tobacco: Never Used   Substance Use Topics     Alcohol use: Yes     Alcohol/week: 1.0 standard drinks     Types: 1 Glasses of wine per week     Drug use: Yes     Comment: edibles      Past medical history, past surgical history, medications, allergies, family history, and social history were reviewed with the patient. No additional pertinent items.       Review of Systems  A complete review of systems was performed with pertinent positives and negatives noted in the HPI, and all other systems negative.    Physical Examination   BP: (!) 141/79  Pulse: 72  Temp: 97.1  F (36.2  C)  Resp: 18  Height: 160 cm (5' 3\")  Weight: 59 kg (130 lb)  SpO2: 96 %    Physical Exam  General: Appears stated age.   Neuro: Alert and fully oriented. Extremities appear to demonstrate normal strength on visual inspection.   Integumentary/Skin: no rash visualized, normal color    Psychiatric Examination   Appearance: awake, alert  Attitude:  cooperative  Eye Contact:  fair  Mood:  depressed  Affect:  mood congruent and Tearful  Speech:  clear, coherent  Psychomotor Behavior:  no evidence of tardive dyskinesia, dystonia, or tics  Throught Process:  linear  Associations:  no loose associations  Thought Content:  no evidence of suicidal ideation or homicidal ideation and no evidence of psychotic thought  Insight:  fair  Judgement:  intact  Oriented to:  time, person, and place  Attention Span and Concentration:  intact  Recent and Remote Memory:  intact    ED Course        Labs Ordered and Resulted from Time of ED Arrival Up to " the Time of Departure from the ED   SARS-COV2 (COVID-19) VIRUS RT-PCR - Normal    Narrative:     Testing was performed using the callie  SARS-CoV-2 & Influenza A/B Assay on the callie  Lenora  System.  This test should be ordered for the detection of SARS-COV-2 in individuals who meet SARS-CoV-2 clinical and/or epidemiological criteria. Test performance is unknown in asymptomatic patients.  This test is for in vitro diagnostic use under the FDA EUA for laboratories certified under CLIA to perform moderate and/or high complexity testing. This test has not been FDA cleared or approved.  A negative test does not rule out the presence of PCR inhibitors in the specimen or target RNA in concentration below the limit of detection for the assay. The possibility of a false negative should be considered if the patient's recent exposure or clinical presentation suggests COVID-19.  [facility] are certified under the Clinical Laboratory Improvement Amendments of 1988 (CLIA-88) as qualified to perform moderate and/or high complexity laboratory testing.   DRUG ABUSE SCREEN 77 URINE (FL, RH, SH)   COVID-19 VIRUS (CORONAVIRUS) BY PCR    Narrative:     The following orders were created for panel order Asymptomatic COVID-19 Virus (Coronavirus) by PCR Nasopharyngeal.  Procedure                               Abnormality         Status                     ---------                               -----------         ------                     SARS-COV2 (COVID-19) Vir...[[id number]]  Normal              Final result                 Please view results for these tests on the individual orders.       Assessments & Plan (with Medical Decision Making)   Patient presenting with depressed mood and suicidal thoughts. Nursing notes reviewed noting no acute issues.     I have reviewed the assessment completed by the [facility].     Preliminary diagnosis:    ICD-10-CM    1. Major depressive disorder, recurrent episode, moderate (H)  F33.1     2. Suicidal ideation  R45.851    3. History of depression  Z86.59    4. Posttraumatic stress disorder  F43.10         Treatment Plan:  -Continue observation status for 1 more evening in anticipation of gaining adequate improvements by tomorrow to safely transition back to the ambulatory setting.  -Start Zoloft 25 mg today.  The patient previously responded very well to a low dose of Zoloft.  -Referral for individual psychotherapy and intensive outpatient programming.  -Plan to reassess tomorrow.    --  Neal Plascencia MD   [facility] EMERGENCY DEPT  EmPATH Unit  7/12/2021      Neal Plascencia MD  07/13/21 [id number]

## 2021-07-13 NOTE — ED NOTES
Progress Note    Client Name: Ruth Johnston  Date: 7/13/21         Service Type: 34462 - Psychotherapy (with patient) - 30 (16-37*) min      Session Start Time: 4:01 pm  Session End Time: 4:30 pm      Session Length: .50 hrs     Session #: 2        DATA      Progress Since Last Session (Related to Symptoms / Goals / Homework):   Symptoms: Improving Pt is able to identify more healthy ways of coping and more healthy ways of thinking.        Episode of Care Goal Progress:                Pt focused on identifying her current thinking patterns and allow herself time to process and communicating her feelings and working              self validation.Focused on processing feelings of distress and pain. Provided validation and support to processing emotions and                      thoughts related to current crisis. Worked on identifying strengths and practicing making more positive self statements.     Current / Ongoing Stressors and Concerns:   Pt is still processing her feelings from recent complete break up of relationship     Treatment Objective(s) Addressed in This Session:   Focused on depressed thinking patterns and on triggers to suicidal ideation.     Intervention:   Cognitive Behavioral Therapy: Focused on identifying unhelpful beliefs and challenging and replacing unhelpful beliefs.                    Solution Focused: Focused on future thinking and next steps for treatment.        ASSESSMENT: Current Emotional / Mental Status (status of significant symptoms):   Risk status (Self / Other harm or suicidal ideation)  Client has had a history of suicidal ideation: recent suicidal ideation with not plan or intent   Client denies current fears or concerns for personal safety.   Client reports the following current or recent suicidal ideation or behaviors: suicidal ideation recently that is triggered by feeling rejected. Denies current suicidal ideation..   Client denies current or recent homicidal ideation or  behaviors.   Client denies current or recent self injurious behavior or ideation.   Client denies other safety concerns.   A safety and risk management plan has been developed including: Collaborative care team was informed of patient's risk status and plan.     Appearance:   Appropriate    Eye Contact:   Good    Psychomotor Behavior: Normal    Attitude:   Cooperative    Orientation:   All   Speech    Rate / Production: Normal/ Responsive    Volume:  Normal    Mood:    Sad    Affect:    Tearful   Thought Content:  Clear    Thought Form:  Flight of Ideas    Insight:    Good      Medication Review:   Changes to psychiatric medications, see updated Medication List in EPIC.      Medication Compliance:   Yes     Changes in Health Issues:   None reported     Chemical Use Review:   Substance Use: Chemical use reviewed, no active concerns identified      Tobacco Use: No current tobacco use.       Collateral Reports Completed:   Not Applicable    PLAN: (Client Tasks / Therapist Tasks / Other)    Pt will likely discharge tomorrow. Day Treatment intake is scheduled for 7/21/21 at 10:30 am          Karol Belrte [facility] July 13, 2021

## 2021-07-13 NOTE — PLAN OF CARE
Pt. awake and alert early AM. Reports that the vistaril helped her to achieve sleep but had difficulty getting a restful sleep due to environment. Depressed, anxious and tearful as she processes events leading to current depression. Rejection from  triggering issues of PTSD from childhood. Grief related to losses. Reportedly making progress in therapy but agrees she would benefit from increased therapy and starting medication. Slightly less hopeless this am. Thoughts of wanting to be done with the depression and pain but denies active suicidal ideation.

## 2021-07-14 VITALS
RESPIRATION RATE: 16 BRPM | WEIGHT: 130.2 LBS | OXYGEN SATURATION: 94 % | DIASTOLIC BLOOD PRESSURE: 67 MMHG | HEIGHT: 63 IN | BODY MASS INDEX: 23.07 KG/M2 | TEMPERATURE: 98.7 F | SYSTOLIC BLOOD PRESSURE: 116 MMHG | HEART RATE: 69 BPM

## 2021-07-14 PROCEDURE — G0378 HOSPITAL OBSERVATION PER HR: HCPCS

## 2021-07-14 PROCEDURE — 99217 PR OBSERVATION CARE DISCHARGE: CPT | Performed by: PSYCHIATRY & NEUROLOGY

## 2021-07-14 PROCEDURE — 250N000013 HC RX MED GY IP 250 OP 250 PS 637: Performed by: PSYCHIATRY & NEUROLOGY

## 2021-07-14 RX ADMIN — SERTRALINE HYDROCHLORIDE 25 MG: 25 TABLET ORAL at 07:54

## 2021-07-14 ASSESSMENT — ACTIVITIES OF DAILY LIVING (ADL)
DRESS: SCRUBS (BEHAVIORAL HEALTH);STREET CLOTHES
DRESS: STREET CLOTHES

## 2021-07-14 NOTE — ED NOTES
Upset and tearful, went into the sensory room and was punching the bean bag with both fists.  Distressed and overwhelmed.  Pt spoke with writer about what she was experiencing and precipitating experiences. After 1:1 pt felt calmer and went to rest in her chair.  Hot washcloth provided.  Declined PRN and preferred to talk.   Denied pain in her hands and wrists.

## 2021-07-14 NOTE — ED NOTES
Discharge instructions reviewed with patient including follow-up care plan. Educated on medication regime including review of name, dose, route, frequency, side effects, and next dose needed.  Advised not to stop prescribed medication without consulting their physician.  Reviewed safety plan and outpatient resources.  Coping skills and support network reviewed.  Receptive to instructions and teachings.  Copy of AVS given to patient. Denied suicidal ideation. All belongings which where brought into the hospital have been returned to patient. Escorted off the unit to triage to be picked up by a friend. Patient discharged at 1400.

## 2021-07-14 NOTE — ED PROVIDER NOTES
"EmPATH Unit - Psychiatric Observation Discharge Summary  [facility] Emergency Department  Discharge Date: 7/14/2021    Ruth Johnston MRN: [id number]   Age: 55 year old YOB: 1965     Brief HPI & Initial ED Course     Chief Complaint   Patient presents with     Suicidal     HPI  Ruth Johnston is a 55 year old female with history notable for PTSD, MDD, and a TBI who presents to the empath unit reporting depressed mood and suicidal thoughts.  She initially met with Dr. Sumner who entered the patient into observation status while noting psychosocial stressors that were contributing to her decompensation.  It was also noted that the patient had been off of Zoloft for some time, a medication that was previously quite helpful for her.  She is being reassessed today.  Overnight, there were no acute issues.    On examination today, the patient reports gaining therapeutic benefit from her secondary on the unit.  She has met with the therapist a few times and has been utilizing her journal to aid in processing thoughts and emotions.  Today, her mood has improved some and she feels reassured about returning home and utilizing her supports while following up with an outpatient program.  She is further reassured by additional resources such as crisis facilities.  She is tolerating Zoloft without side effects.  She denies suicidal or homicidal thoughts today.    Physical Examination   BP: 116/67  Pulse: 69  Temp: 98.7  F (37.1  C)  Resp: 16  Height: 160 cm (5' 3\")  Weight: 59.1 kg (130 lb 3.2 oz)  SpO2: 94 %    Physical Exam  General: Appears stated age.   Neuro: Alert and fully oriented. Extremities appear to demonstrate normal strength on visual inspection.   Integumentary/Skin: no rash visualized, normal color    Psychiatric Examination   Appearance: awake, alert  Attitude:  cooperative  Eye Contact:  fair  Mood:  better  Affect:  appropriate and in normal range  Speech:  clear, coherent  Psychomotor " Behavior:  no evidence of tardive dyskinesia, dystonia, or tics  Throught Process:  linear  Associations:  no loose associations  Thought Content:  no evidence of suicidal ideation or homicidal ideation and no evidence of psychotic thought  Insight:  fair  Judgement:  intact  Oriented to:  time, person, and place  Attention Span and Concentration:  intact  Recent and Remote Memory:  fair    Results        Labs Ordered and Resulted from Time of ED Arrival Up to the Time of Departure from the ED   SARS-COV2 (COVID-19) VIRUS RT-PCR - Normal    Narrative:     Testing was performed using the callie  SARS-CoV-2 & Influenza A/B Assay on the callie  Lenora  System.  This test should be ordered for the detection of SARS-COV-2 in individuals who meet SARS-CoV-2 clinical and/or epidemiological criteria. Test performance is unknown in asymptomatic patients.  This test is for in vitro diagnostic use under the FDA EUA for laboratories certified under CLIA to perform moderate and/or high complexity testing. This test has not been FDA cleared or approved.  A negative test does not rule out the presence of PCR inhibitors in the specimen or target RNA in concentration below the limit of detection for the assay. The possibility of a false negative should be considered if the patient's recent exposure or clinical presentation suggests COVID-19.  [facility] are certified under the Clinical Laboratory Improvement Amendments of 1988 (CLIA-88) as qualified to perform moderate and/or high complexity laboratory testing.   COVID-19 VIRUS (CORONAVIRUS) BY PCR    Narrative:     The following orders were created for panel order Asymptomatic COVID-19 Virus (Coronavirus) by PCR Nasopharyngeal.  Procedure                               Abnormality         Status                     ---------                               -----------         ------                     SARS-COV2 (COVID-19) Vir...[[id number]]  Normal              Final  result                 Please view results for these tests on the individual orders.       Observation Course   The patient was found to have a psychiatric condition that would benefit from an observation stay in the emergency department for further psychiatric stabilization and/or coordination of a safe disposition. The plan upon observation admission included serial assessments of psychiatric condition, potential administration of medications if indicated, further disposition pending the patient's psychiatric course during the monitoring period.     Serial assessments of the patient's psychiatric condition were performed. Nursing notes were reviewed. During the observation period, the patient did not require medications for agitation, and did not require restraints/seclusion for patient and/or provider safety.     After a period of working with the treatment team on the EmPATH unit, the patient's mental state improved to allow a safe transition to outpatient care. After counseling on the diagnosis, work-up, and treatment plan, the patient was discharged. Close follow-up with a psychiatrist and/or therapist was recommended and community psychiatric resources were provided. Patient is to return to the ED if any urgent or potentially life-threatening concerns.     Discharge Diagnoses:   Final diagnoses:   Suicidal ideation   History of depression   Major depressive disorder, recurrent episode, moderate (H)   Posttraumatic stress disorder       Treatment Plan:  -Continue Zoloft 25 mg daily targeting mood and anxiety management.  -Referral for individual psychotherapy and IOP  -Discharge home today      At the time of discharge, the patient's acute suicide risk was determined to be low due to the following factors: Reduction in the intensity of mood/anxiety symptoms that preceded the admission, denial of suicidal thoughts, denies feeling helpless or helpless, not currently under the influence of alcohol or illicit  substances, denies experiencing command hallucinations, no immediate access to firearms. The patient's acute risk could be higher if noncompliant with their treatment plan, medications, follow-up appointments or using illicit substances or alcohol. Protective factors include: social supports, children, stable housing, employment    --  Neal Plascencia MD  [facility] EMERGENCY DEPT  EmPATH Unit  7/14/2021      Neal Plascencia MD  07/14/21 1125

## 2021-07-14 NOTE — PLAN OF CARE
Harvey was somewhat tearful throughout the evening. This writer spent some time processing her relationship with her  and talking about how she can move forward. She spent some time in the sensory room singing and reading a book. She took hydroxyzine 2x this evening for sleep. Pt will remain on observation overnight. She has an intake for a day treatment program in the upcoming days.

## 2021-07-14 NOTE — ED NOTES
Pt reported not sleeping well but OK.  States she is anxious and not excited to be here but is also concerned about wht the aftercare plan will be.  Denied suicidal ideations and hallucinations.

## 2021-07-14 NOTE — ED NOTES
"emPATH [facility] Reassessment and Progress Note    [facility]  Date: July 14, 2021       Presenting issue that brought patient to the emPATH unit:  Pt presented to the ED due to increased depression, anxiety and suicidal ideation.  She reports increased stress due to the dissolution of her marriage after being with her  for 29 years.    Current presentation on the unit:  When approached by writer initially, pt was in a sensory room crying loudly and had her head against the wall (not hitting her head).  She reports she was just \"letting the grief out\" and calmed down quickly upon speaking with writer.  Pt was given some time to continue to process and writer re-approached pt later and she was very calm and able to engage in reassessment.    Current risk to self or others? No    Summary of therapeutic interventions completed with patient:  Writer processed treatment goals and objectives with patient and reviewed after care plan and safety plan.  Provided active listening, support and validation.      Treatment objectives addressed in this session: 1) Identify life factors/triggers that preceded the SI.  2) Participate in therapy session around emotional issues resulting in SI.    Progress on treatment goals: Goal 1): Alleviate SI/stabilize the suicidal crisis:  Pt has been able to work on a plan to stabilize her crisis and develop support upon discharge.  Writer provided additional support resources and validation.      Goal 2): Patient will increase awareness of depression symptoms and their impact on functioning and develop skills to reduce negative impact:  Pt was able to identify the need to take her medication to decrease her depression as well as skills she needs to utilize to manage her depression and anxiety moving forward.    Additional collateral information: N/A     Mental Status:     Appearance:   Appropriate    Eye Contact:   Good    Psychomotor Behavior: Normal "    Attitude:   Cooperative  Interested Friendly Pleasant   Orientation:   All   Speech    Rate / Production: Normal/ Responsive    Volume:  Normal    Mood:    Sad    Affect:    Appropriate    Thought Content:  Clear    Thought Form:  Coherent  Goal Directed  Logical    Insight:    Good       Plan: Pt has contacted friends to provide support once she discharges home (Jessica from [place] in particular).  She has made a list of goals to focus on.  She was given information about [facility] and educated on how to self-refer if she needs increased support without returning to the hospital.  Pt has a day treatment intake scheduled for 7/21/21 @ 10:30 am.      Disposition: Programmatic care: Day Treatment    Rationale for disposition: Pt is able to commit to safety and has made a plan for support in the home and increased outpatient mental health treatment.  Psychiatry recommends and supports discharge.    Reviewed assessment with attending provider: Yes     Total time spent with patient:.50 hrs     CPT code: 43773 - Psychotherapy (with patient) - 30 (16-37*) min      VANNESA Dominguez

## 2021-07-14 NOTE — PLAN OF CARE
Problem: Adult Inpatient Plan of Care  Goal: Optimal Comfort and Wellbeing  Outcome: Improving     Problem: Sleep Disturbance  Goal: Adequate Sleep/Rest  Outcome: Improving   Pt has been sleeping sound with no sign of distress.

## 2021-07-14 NOTE — DISCHARGE INSTRUCTIONS
"Evaluation for Day Treatment scheduled: 7/21/2021 10:30 AM Sobeida David LICSW       If I am feeling unsafe or I am in a crisis, I will:   Contact my established care providers   Call the National Suicide Prevention Lifeline: [phone]   Go to the nearest emergency room   Call 325          Warning signs that I or other people might notice when a crisis is developing for me: Feeling anxious, hopeless    Things I am able to do on my own to cope or help me feel better: Go to the gym, rest, intuitive eating, take my medication, focus on me     Things that I am able to do with others to cope or help me better: Access my friends, neighbors     Things I can use or do for distraction: Go to the gym, rest, talk to friends, neighbors     Changes I can make to support my mental health and wellness: Start day treatment, take my Sertraline     People in my life that I can ask for help: Friends, neighbors, brother     Your [facility] has a mental health crisis team you can call 24/7: [facility] Line Number: [phone]    Other things that are important when I m in crisis: \"Focus on me\"     Additional resources and information: [facility] is available (see brochure given)     "

## 2021-07-20 ASSESSMENT — ANXIETY QUESTIONNAIRES
4. TROUBLE RELAXING: NEARLY EVERY DAY
6. BECOMING EASILY ANNOYED OR IRRITABLE: MORE THAN HALF THE DAYS
8. IF YOU CHECKED OFF ANY PROBLEMS, HOW DIFFICULT HAVE THESE MADE IT FOR YOU TO DO YOUR WORK, TAKE CARE OF THINGS AT HOME, OR GET ALONG WITH OTHER PEOPLE?: EXTREMELY DIFFICULT
5. BEING SO RESTLESS THAT IT IS HARD TO SIT STILL: NEARLY EVERY DAY
GAD7 TOTAL SCORE: 20
1. FEELING NERVOUS, ANXIOUS, OR ON EDGE: NEARLY EVERY DAY
7. FEELING AFRAID AS IF SOMETHING AWFUL MIGHT HAPPEN: NEARLY EVERY DAY
GAD7 TOTAL SCORE: 20
7. FEELING AFRAID AS IF SOMETHING AWFUL MIGHT HAPPEN: NEARLY EVERY DAY
GAD7 TOTAL SCORE: 20
2. NOT BEING ABLE TO STOP OR CONTROL WORRYING: NEARLY EVERY DAY
3. WORRYING TOO MUCH ABOUT DIFFERENT THINGS: NEARLY EVERY DAY

## 2021-07-20 ASSESSMENT — PATIENT HEALTH QUESTIONNAIRE - PHQ9
SUM OF ALL RESPONSES TO PHQ QUESTIONS 1-9: 21
10. IF YOU CHECKED OFF ANY PROBLEMS, HOW DIFFICULT HAVE THESE PROBLEMS MADE IT FOR YOU TO DO YOUR WORK, TAKE CARE OF THINGS AT HOME, OR GET ALONG WITH OTHER PEOPLE: EXTREMELY DIFFICULT
SUM OF ALL RESPONSES TO PHQ QUESTIONS 1-9: 21

## 2021-07-21 ENCOUNTER — HOSPITAL ENCOUNTER (OUTPATIENT)
Dept: BEHAVIORAL HEALTH | Facility: CLINIC | Age: 56
Discharge: HOME | End: 2021-07-21
Attending: FAMILY MEDICINE | Admitting: FAMILY MEDICINE
Payer: COMMERCIAL

## 2021-07-21 PROCEDURE — 90791 PSYCH DIAGNOSTIC EVALUATION: CPT | Mod: GT | Performed by: COUNSELOR

## 2021-07-21 ASSESSMENT — PATIENT HEALTH QUESTIONNAIRE - PHQ9: SUM OF ALL RESPONSES TO PHQ QUESTIONS 1-9: 21

## 2021-07-21 ASSESSMENT — ANXIETY QUESTIONNAIRES: GAD7 TOTAL SCORE: 20

## 2021-07-21 NOTE — PROGRESS NOTES
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 Credentials:  NIKIA Castillo, RADHA, LAURO       July 21, 2021               Outpatient Mental Health Services - Adult    MY COPING PLAN FOR SAFETY    PATIENT'S NAME: Ruth Johnston  MRN:   [id number]    SAFETY PLAN:    Step 1: Warning signs / cues (Thoughts, images, mood, situation, behavior) that a crisis may be developing:      Thoughts: \"Why am I here?\"  She wakes up in enough pain, and thinks \"I don't see an end to this.\"    Images: none    Thinking Processes: ruminations (can't stop thinking about my problems) and highly critical and negative thoughts    Mood: worsening depression, hopelessness, helplessness, intense worry and disinhibited (not caring about things or consequences)    Behaviors: can't stop crying, not taking care of myself, not taking care of my responsibilities and not sleeping enough    Situations: loss, relationship problems, trauma  and financial stress       Step 2: Coping strategies - Things I can do to take my mind off of my problems without contacting another person (relaxation technique, physical " "activity):      Distress Tolerance Strategies:   She pets her cat now. Calling someone, writing, singing, yardwork, nutrition is important.     Physical Activities: Being out in yard. Working out was her go to. She wants to get back to the gym. She was trying to keep her marriage together and didn't have the energy to go the gym. Sometimes she runs and runs and does too much and is too busy.    Focus on helpful thoughts:  This is temporary, I will get through this and remind myself of what is important to me, self compassion statements    Step 3: People and social settings that provide distraction:     Name: Friend Susan, but she is moving to [place].  Friend Roman, Uncle Shashank. She has at least 15 friends she can count on. She knows to have a support list.    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 People have told her that her writing is good and should shared.     Step 5: When I am in crisis, I can ask these people to help me use my safety plan:     Name: Friend Susan, but she is moving to [place].    Name: Uncle Shashank Phone: in phone    Step 6: Making the environment safe:       Be around others.  She changed all the locks at her house because her  was coming over and getting things out of the garage.     Step 7: Professionals or agencies I can contact during a crisis:      Suicide Prevention Lifeline: 7-776-841-TALK (7770)    Crisis Text Line Service (available 24 hours a day, 7 days a week): Text [place] to 051481    Call  **CRISIS (128565) from a cell phone to talk to a team of professionals who can help you.    Crisis Services By County: Phone Number:   [place]    "  [phone]   [place]    [phone]   [place]    [phone]   [place]    [phone]   [place]    [phone]   [place] [phone]   [place]     [phone]       Call 911 or go to my nearest emergency department.     I helped develop this safety plan and agree to use it when needed.  I have been given a copy of this plan.        Today s date:  2021  Adapted from Safety Plan Template 2008 Melva Gilman and Portillo Dietz is reprinted with the express permission of the authors.  No portion of the Safety Plan Template may be reproduced without the express, written permission.  You can contact the authors at [email or web address]@[email or web address] or [email or web address]        LOCUS Worksheet     Name: Ruth Johnston MRN: [id number]    : 1965      Gender:  female    PMI:  NA   Provider Name: ealth Pascale   Provider NPI:  [id number]    Actual level of Care Provided:  none    Service(s) receiving or referred to:  PHP    Reason for Variance: patient needs more structure and supports    Rating completed by: Sobeida David, MSW, LICSW, LADC      I. Risk of Harm:   3      Moderate Risk of Harm    II. Functional Status:   3      Moderate Impairment    III. Co-Morbidity:   3      Significant Co-Morbidity    IV - A. Recovery Environment - Level of Stress:   3      Moderately Stress Environment    IV - B. Recovery Environment - Level of Support:   3      Limited Support in Environment    V. Treatment and Recovery History:   3      Moderate to Equivocal Response to Treatment and Recovery Management    VI. Engagement and Recovery Project:   3      Limited Engagement and Recovery       21 Composite Score    Level of Care Recommendation:   20 to 22       Medically Monitored Non-Residential Services

## 2021-07-22 ENCOUNTER — TELEPHONE (OUTPATIENT)
Dept: BEHAVIORAL HEALTH | Facility: CLINIC | Age: 56
End: 2021-07-22

## 2021-07-22 NOTE — TELEPHONE ENCOUNTER
Writer contacted patient to schedule start in PHP program. Patient stated that she scheduled an out of town trip and cannot start PHP until 8/2. Writer scheduled DA update for 7/30 at 9:00AM.     Iva Bearden, [facility], [facility]  7/22/2021

## 2021-07-22 NOTE — TELEPHONE ENCOUNTER
Writer contacted patient for second time to try to schedule start in PHP program.     Iva Bearden, [facility], [facility]  7/22/2021

## 2021-07-22 NOTE — TELEPHONE ENCOUNTER
Writer contacted patient to schedule start in PHP program. Left message requesting return phone call.     Iva Bearden, [facility], [facility]  7/22/2021

## 2021-07-30 ENCOUNTER — BEH TREATMENT PLAN (OUTPATIENT)
Dept: BEHAVIORAL HEALTH | Facility: CLINIC | Age: 56
End: 2021-07-30
Attending: PSYCHIATRY & NEUROLOGY

## 2021-07-30 ENCOUNTER — TELEPHONE (OUTPATIENT)
Dept: BEHAVIORAL HEALTH | Facility: CLINIC | Age: 56
End: 2021-07-30

## 2021-07-30 DIAGNOSIS — F33.2 MDD (MAJOR DEPRESSIVE DISORDER), RECURRENT SEVERE, WITHOUT PSYCHOSIS (H): Primary | ICD-10-CM

## 2021-07-30 NOTE — TELEPHONE ENCOUNTER
----- Message from GENA Sotelo sent at 7/30/2021  9:03 AM CDT -----  Regarding: start in PHP 8/2  Scheduling Request    Patient Name: Ruth Johnston  Location of programming: virtual  Start Date: August / 02 / 2021  Group: PHP on Monday, Tuesday, Wednesday, Thursday, and Friday at 9:00 AM to 3:00 PM  Attending Provider (MD): Dr. Lang Leger  Number of visits to be scheduled: 50  Duration of Appointment in minutes: 360  Visit Type: Zoom - 2657    Additional notes:

## 2021-07-30 NOTE — PROGRESS NOTES
Admission Date: 8/2/2021    Identify any current concerns with potential impact to admission:     medication/medical concerns: went back to generic cirtraline on 25mg and stopped it due to feeling better. Began taking again at 25mg after recommendation from EMPATH unit. Needs to up dose back to 50mg as recommended by doctor at EMPATH unit.     immediate safety concerns: passive suicidal ideation with no intent, method, or means. Patient committed to safety.     Does patient have safety plan? yes  Note: Please copy safety plan copied into BEH Encounter     Other (insurance/childcare/transportation/housing/planned absences/etc): using telephone to access ZOOM    Patient's insurance is: [employer].     Does patient need appointment with provider? Yes - [facility] program    Review patient's program schedule and inform them of any variation due to late days or holidays.                                                                                        Completed by: Iva Bearden MA, LPCC, LADC

## 2021-07-30 NOTE — PROGRESS NOTES
"Outpatient Mental Health Services - Adult     MY COPING PLAN FOR SAFETY     PATIENT'S NAME:    Ruth Johnston  MRN:                           [id number]     SAFETY PLAN:     Step 1: Warning signs / cues (Thoughts, images, mood, situation, behavior) that a crisis may be developing:     ? Thoughts: \"Why am I here?\"  She wakes up in enough pain, and thinks \"I don't see an end to this.\"  ? Images: none  ? Thinking Processes: ruminations (can't stop thinking about my problems) and highly critical and negative thoughts  ? Mood: worsening depression, hopelessness, helplessness, intense worry and disinhibited (not caring about things or consequences)  ? Behaviors: can't stop crying, not taking care of myself, not taking care of my responsibilities and not sleeping enough  ? Situations: loss, relationship problems, trauma  and financial stress   ?    Step 2: Coping strategies - Things I can do to take my mind off of my problems without contacting another person (relaxation technique, physical activity):     ? Distress Tolerance Strategies:   She pets her cat now. Calling someone, writing, singing, yardwork, nutrition is important.   ? Physical Activities: Being out in yard. Working out was her go to. She wants to get back to the gym. She was trying to keep her marriage together and didn't have the energy to go the gym. Sometimes she runs and runs and does too much and is too busy.  ? Focus on helpful thoughts:  This is temporary, I will get through this and remind myself of what is important to me, self compassion statements     Step 3: People and social settings that provide distraction:                 Name: Friend Cat, but she is moving to [place].  Friend Roman, Uncle Shashank. She has at least 15 friends she can count on. She knows to have a support list.               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 People have told her that her writing is good and should shared.      Step 5: When I am in crisis, I can ask these people to help me use my safety plan:                 Name: Friend Cat, but she is moving to [place].               Name: Uncle Shashank     Phone: in phone     Step 6: Making the environment safe:      ? Be around others.  She changed all the locks at her house because her  was coming over and getting things out of the garage.      Step 7: Professionals or agencies I can contact during a crisis:     ? Suicide Prevention Lifeline: 9-032-460-TALK (8865)  ? Crisis Text Line Service (available 24 hours a day, 7 days a week): Text [place] to 466942  ? Call  **CRISIS (553567) from a cell phone to talk to a team of professionals who can help you.          Crisis Services By [facility]: Phone Number:   [place]     [phone]   [place]    [phone]   [place]    [phone]   [place]    [phone]   [place]    [phone]   [place] [phone]   [place]     [phone]      ? Call 911 or go to my nearest emergency department.             I helped develop this safety plan and agree to use it when needed.  I have been given a copy of this plan.          Today s date:  7/21/2021  Adapted from Safety Plan Template 2008 Melva Gilman and Portillo Dietz is reprinted with the express permission of the authors.  No portion of the Safety Plan Template may be reproduced without the express, written permission.  You can contact the authors at [email or web address] or [email or web address]             "

## 2021-07-30 NOTE — TELEPHONE ENCOUNTER
"Writer contacted patient to complete clinical symptom update in preparation for PHP start on 8/2 and completed admit to program.     Patient endorsed passive suicidal ideation with no intent, method or means. Patient began to cry when discussing. Patient committed to safety plan.   Patient endorsed high symptoms due to dog dying last week and finalization of divorce last week.   Patient reports symptoms have impacted her ability to complete daily living tasks and ability to work. Without PHP program patient at risk for hospitalization.       Patient endorsed the following symptoms:       Review of Symptoms per patient report endorsed at  and also endorsed today 7/30/21:   Depression:     Change in sleep, Lack of interest, Excessive or inappropriate guilt, Change in energy level, Difficulties concentrating, Change in appetite, Suicidal ideation, Feelings of hopelessness, Feelings of helplessness, Low self-worth, Ruminations, Feeling sad, down, or depressed and Frequent crying - She is not sleeping enough. She normally gets 9 hours of sleep and sleeps well.   Marjorie:             No Symptoms -  She is energetic person. 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 She doesn't know if that is marjorie or just her personality  Psychosis:       No Symptoms - When prescribed Abilify about 15 or 20 years ago, she had some psychosis.   Anxiety:           Excessive worry, Nervousness, Physical complaints, muscle tension, Sleep disturbance, Psychomotor agitation, Ruminations, Poor concentration and Irritability   Panic:              No symptoms - She had one 10 years while in [place].   Post Traumatic Stress Disorder:  Experienced traumatic event, Reexperiencing of trauma, Avoids traumatic stimuli, Impaired functioning, Nightmares and Dissociation - She stated she has read about Borderline Personality Disorder. She has felt like she has \"had a split off\" her whole " "life. She won't feel things, but then realize that yesterday she was crying. She then wonders who it was that was crying. She denied fugue or being in unfamiliar places.   Eating Disorder:          In the 1980s, she was a [place] [employer] and was vomiting food. She hasn't vomited since then. She is able to control food intake, which made her a good . Currently, she is restricting but then will \"gorge\".     ADD / ADHD:              Distractibility, Interrupts, Impulsive, Restlessness/fidgety, Hyperverbal and Hyperactive  Conduct Disorder:       No symptoms  Autism Spectrum Disorder:     No symptoms  Obsessive Compulsive Disorder:       Counting - She counts to 5 on her hands, sometimes to the rhythm of music. She goes over lyrics all the time. It bothers her and it happens every day.           Current Mental Status Exam:   Appearance:                Appropriate    Eye Contact:               Good   Psychomotor:              Normal  Agitated       Gait / station:           no problem  Attitude / Demeanor:   Cooperative  Friendly  Speech      Rate / Production:   Normal/ Responsive Emotional      Volume:                   Normal  volume      Language:               intact  Mood:                          Anxious  Depressed  Sad   Affect:                          Labile    Thought Content:        Clear   Thought Process:        Coherent Circumstantial      Associations:           No loosening of associations  Insight:                         Good   Judgment:                   Intact   Orientation:                 All  Attention/concentration:          Fair     "

## 2021-08-02 ENCOUNTER — HOSPITAL ENCOUNTER (OUTPATIENT)
Dept: BEHAVIORAL HEALTH | Facility: CLINIC | Age: 56
End: 2021-08-02
Attending: PSYCHIATRY & NEUROLOGY
Payer: COMMERCIAL

## 2021-08-02 PROBLEM — F33.2 MDD (MAJOR DEPRESSIVE DISORDER), RECURRENT SEVERE, WITHOUT PSYCHOSIS (H): Status: ACTIVE | Noted: 2021-08-02

## 2021-08-02 PROCEDURE — H0035 MH PARTIAL HOSP TX UNDER 24H: HCPCS | Mod: GT,95

## 2021-08-02 PROCEDURE — 99202 OFFICE O/P NEW SF 15 MIN: CPT | Mod: GT | Performed by: PSYCHIATRY & NEUROLOGY

## 2021-08-02 PROCEDURE — H0035 MH PARTIAL HOSP TX UNDER 24H: HCPCS | Mod: GT

## 2021-08-02 PROCEDURE — H0035 MH PARTIAL HOSP TX UNDER 24H: HCPCS | Mod: GT,95 | Performed by: OCCUPATIONAL THERAPIST

## 2021-08-02 PROCEDURE — H0035 MH PARTIAL HOSP TX UNDER 24H: HCPCS | Mod: GT | Performed by: COUNSELOR

## 2021-08-02 NOTE — GROUP NOTE
Psychoeducation Group Note    PATIENT'S NAME: Ruth Johnston  MRN:   [id number]  :   1965  ACCT. NUMBER: [id number]  DATE OF SERVICE: 21  START TIME:  9:00 AM  END TIME:  9:50 AM  FACILITATOR: Sharonda Redd RN  TOPIC: LUDIN RN Group: Medication Education and Management  [facility] Adult Partial Hospitalization Program  TRACK: 1    NUMBER OF PARTICIPANTS: 6    Summary of Group / Topics Discussed:  Medication Educations and Management:  Medication Categories: Patient were provided with a brief overview of four major psychotropic medication categories. Expected effects, potential side effects, adverse reactions, and contraindications were discussed. Patients learned about how their medications work to treat their illness and reviewed safe medication management, handling, and disposal of medications.     Patient Session Goals / Objectives:  ? Listed the four major categories of psychotropic medications (antidepressants, antipsychotics, mood stabilizers, anti-anxiety)  ? Identified medications in each category and important adverse reactions/contraindications for use  ? Explained how the medications they take work to treat their illness                                     Service Modality:  Video Visit     Telemedicine Visit: The patient's condition can be safely assessed and treated via synchronous audio and visual telemedicine encounter.      Reason for Telemedicine Visit: Services only offered telehealth    Originating Site (Patient Location): Patient's home    Distant Site (Provider Location): Provider Remote Setting- Home Office    Consent:  The patient/guardian has verbally consented to: the potential risks and benefits of telemedicine (video visit) versus in person care; bill my insurance or make self-payment for services provided; and responsibility for payment of non-covered services.     Patient would like the video invitation sent by:  My Chart    Mode of Communication:  Video  Conference via Medical Zoom    As the provider I attest to compliance with applicable laws and regulations related to telemedicine.           Patient Participation / Response:  Moderately participated, sharing some personal reflections / insights and adequately adequately received / provided feedback with other participants.     Verbalized understanding of medication education and management topic    Treatment Plan:  Patient has a current master individualized treatment plan.  See Epic treatment plan for more information.    Sharonda Redd RN

## 2021-08-02 NOTE — GROUP NOTE
Psychoeducation Group Note    PATIENT'S NAME: Ruth Johnston  MRN:   [id number]  :   1965  ACCT. NUMBER: [id number]  DATE OF SERVICE: 21  START TIME:  1:00 PM  END TIME:  1:50 PM  FACILITATOR: Sharonda Redd RN  TOPIC: MH RN Group: [facility] Adult Partial Hospitalization Program  TRACK: 1    NUMBER OF PARTICIPANTS: 7    Summary of Group / Topics Discussed:  Foundations of Health: Sleep: Pathophysiology of sleep disorders: The anatomy of sleep was reviewed including structures, stages, mechanisms, and the purpose that sleep has on the brain. Sleep disorders were discussed within the group with a focus on gaining knowledge about the etiology and pathophysiology of insomnia, sleep apnea, restless leg syndrome, narcolepsy, medications that can cause risk for sleeping disorders, and other symptoms/factors that may interfere with sleep. Risk factors for developing sleep disorders were discussed and treatments/pharmacological options were explored.     Patient Session Goals / Objectives:  ? Described the effect and purpose of sleep on the brain and identify the amount of sleep needed  ? Identified differences between sleep disorders and risks associated with sleep disorders  ? Described the connection between sleep disturbances and mental illness    Increased knowledge about treatments for sleep disorders                                    Service Modality:  Video Visit     Telemedicine Visit: The patient's condition can be safely assessed and treated via synchronous audio and visual telemedicine encounter.      Reason for Telemedicine Visit: Services only offered telehealth    Originating Site (Patient Location): Patient's home    Distant Site (Provider Location): Provider Remote Setting- Home Office    Consent:  The patient/guardian has verbally consented to: the potential risks and benefits of telemedicine (video visit) versus in person care; bill my insurance or make  self-payment for services provided; and responsibility for payment of non-covered services.     Patient would like the video invitation sent by:  My Chart    Mode of Communication:  Video Conference via Medical Zoom    As the provider I attest to compliance with applicable laws and regulations related to telemedicine.           Patient Participation / Response:  Moderately participated, sharing some personal reflections / insights and adequately adequately received / provided feedback with other participants.    Identified / Expressed personal readiness to practice skills    Treatment Plan:  Patient has a current master individualized treatment plan.  See Epic treatment plan for more information.    Sharonda Redd RN

## 2021-08-02 NOTE — GROUP NOTE
Psychotherapy Group Note    PATIENT'S NAME: Ruth Johnston  MRN:   [id number]  :   1965  ACCT. NUMBER: [id number]  DATE OF SERVICE: 21  START TIME:  2:00 PM  END TIME:  2:50 PM  FACILITATOR: Marie Sarmiento LMFT  TOPIC: MH EBP Group: Social Support  [facility] Adult Partial Hospitalization Program  TRACK: 1    NUMBER OF PARTICIPANTS: 7    Summary of Group / Topics Discussed:  Social Support:  Building and educating social support systems: The patients engaged in a discussion of how their mental health symptoms are related to psychosocial impairment. The patients also shared experiences of when stigma associated with mental illness has created barriers between themselves and their social network. The patients benefitted from this group by exploring ways in which they can re-engage their social network to decrease feelings of isolation and loneliness.    Patient Session Goals / Objectives:    Reduce overidentification/fusion with mental illness as being a primary definer of identity.     Identify ways that support network can assist with recovery.     Reduce stigma associated with mental health diagnosis(es).    Improve interpersonal communication regarding symptoms    Build a sense of mastery and self-respect                                      Service Modality:  Video Visit     Telemedicine Visit: The patient's condition can be safely assessed and treated via synchronous audio and visual telemedicine encounter.      Reason for Telemedicine Visit: Services only offered telehealth    Originating Site (Patient Location): Patient's home    Distant Site (Provider Location): Provider Remote Setting- Home Office    Consent:  The patient/guardian has verbally consented to: the potential risks and benefits of telemedicine (video visit) versus in person care; bill my insurance or make self-payment for services provided; and responsibility for payment of non-covered services.     Patient would like  the video invitation sent by:  My Chart    Mode of Communication:  Video Conference via Medical Zoom    As the provider I attest to compliance with applicable laws and regulations related to telemedicine.            Patient Participation / Response:  Fully participated with the group by sharing personal reflections / insights and openly received / provided feedback with other participants.    Treatment Plan:  Patient has an initial individualized treatment plan that was created as part of their diagnostic assessment / admission process.  A master individualized treatment plan is in the process of being developed with the patient and multi-disciplinary care team.    VANNESA James
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
## 2021-08-03 ENCOUNTER — HOSPITAL ENCOUNTER (OUTPATIENT)
Dept: BEHAVIORAL HEALTH | Facility: CLINIC | Age: 56
End: 2021-08-03
Attending: PSYCHIATRY & NEUROLOGY
Payer: COMMERCIAL

## 2021-08-03 PROCEDURE — H0035 MH PARTIAL HOSP TX UNDER 24H: HCPCS | Mod: GT,95

## 2021-08-03 PROCEDURE — H0035 MH PARTIAL HOSP TX UNDER 24H: HCPCS | Mod: GT,95 | Performed by: COUNSELOR

## 2021-08-03 PROCEDURE — H0035 MH PARTIAL HOSP TX UNDER 24H: HCPCS | Mod: GT | Performed by: SOCIAL WORKER

## 2021-08-03 NOTE — GROUP NOTE
Psychoeducation Group Note    PATIENT'S NAME: Ruth Johnston  MRN:   [id number]  :   1965  ACCT. NUMBER: [id number]  DATE OF SERVICE: 21  START TIME:  2:00 PM  END TIME:  2:50 PM  FACILITATOR: Sharonda Redd RN  TOPIC: LUDIN RN Group: Mental Health Maintenance  [facility] Adult Partial Hospitalization Program  TRACK: 1    NUMBER OF PARTICIPANTS: 7    Summary of Group / Topics Discussed:  Mental Health Maintenance:  Stigma: In this group patients explored stigma surrounding a mental health diagnosis.  The group discussed the way stigma impacts their own life, and discussed strategies to reduce. The relationship between physical and mental health were also explored in the context of healthcare access, treatment, and support.    Patient Session Goals / Objectives:  ? Patients identified the importance of practicing emotional hygiene  ? Patients identified ways to decrease the  impact of stigma in their own life                                      Service Modality:  Video Visit     Telemedicine Visit: The patient's condition can be safely assessed and treated via synchronous audio and visual telemedicine encounter.      Reason for Telemedicine Visit: Services only offered telehealth    Originating Site (Patient Location): Patient's home    Distant Site (Provider Location): Provider Remote Setting- Home Office    Consent:  The patient/guardian has verbally consented to: the potential risks and benefits of telemedicine (video visit) versus in person care; bill my insurance or make self-payment for services provided; and responsibility for payment of non-covered services.     Patient would like the video invitation sent by:  My Chart    Mode of Communication:  Video Conference via Medical Zoom    As the provider I attest to compliance with applicable laws and regulations related to telemedicine.           Patient Participation / Response:  Fully participated with the group by sharing personal  reflections / insights and openly received / provided feedback with other participants.    Verbalized understanding of mental health maintenance topic    Treatment Plan:  Patient has a current master individualized treatment plan.  See Epic treatment plan for more information.    Sharonda Redd RN

## 2021-08-03 NOTE — GROUP NOTE
Psychotherapy Group Note    PATIENT'S NAME: Ruth Johnston  MRN:   [id number]  :   1965  ACCT. NUMBER: [id number]  DATE OF SERVICE: 21  START TIME:  3:00 PM  END TIME:  3:50 PM  FACILITATOR: Faby Alvarenga LICSW  TOPIC:  EBP Group: Enhanced Mindfulness  [facility] Adult Partial Hospitalization Program  TRACK: 1    NUMBER OF PARTICIPANTS: 7    Summary of Group / Topics Discussed:  Enhanced Mindfulness: Body and Mind Integration: Patients received an overview and education regarding the importance of including the body in the management of emotional health and self-care and as a direct route to mindfulness practice.  Patients discussed various ways in which the body can serve as an informant to their physical and emotional experiences/need. Patients discussed the body as a direct link to the present moment and to mindfulness practice.  Patients discussed current relationship with body, self-awareness, mindfulness practice and barriers to connection with body.  Patients were guided through breath work and movement to facilitate greater self-awareness, grounding, self-expression, and connection to other.  Patients discussed how the experiential could be applied to better manage mental health and develop cortez connection to self.    Patient Session Goals / Objectives:    Identify how movement awareness could be used for grounding, stress management, self-expression, connection to other and self-regulation    Improved awareness of breath and movement preferences    Identify how movement and the body is used in mindfulness practice    Reflect on use of these practices in everyday life.    Identify barriers to attending to body                                      Service Modality:  Video Visit     Telemedicine Visit: The patient's condition can be safely assessed and treated via synchronous audio and visual telemedicine encounter.      Reason for Telemedicine Visit: Services only offered  telehealth and covid19    Originating Site (Patient Location): Patient's home    Distant Site (Provider Location): Provider Remote Setting- Home Office    Consent:  The patient/guardian has verbally consented to: the potential risks and benefits of telemedicine (video visit) versus in person care; bill my insurance or make self-payment for services provided; and responsibility for payment of non-covered services.     Patient would like the video invitation sent by:  My Chart    Mode of Communication:  Video Conference via Medical Zoom    As the provider I attest to compliance with applicable laws and regulations related to telemedicine.           Patient Participation / Response:  Fully participated with the group by sharing personal reflections / insights and openly received / provided feedback with other participants.    Demonstrated understanding of topics discussed through group discussion and participation, Identified plan to address barriers to practicing skills discussed in topic and Practiced skills in session    Treatment Plan:  Patient has an initial individualized treatment plan that was created as part of their diagnostic assessment / admission process.  A master individualized treatment plan is in the process of being developed with the patient and multi-disciplinary care team.    ALEX MedinaSW
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
## 2021-08-03 NOTE — PROGRESS NOTES
Adult Partial Hospitalization Program:  Individualized Treatment Plan     Date of Plan: 21    Name: Ruth Johnston MRN: [id number]  :   1965    Programs: Adult Partial Hospitalization Program    DSM5 Primary Diagnosis:  Bipolar 2  PTSD      Adult Partial Hospitalization Program Multidisciplinary Team Members: Taylor Negron [facility], Cheikh Burnett LMFT, Faby Alvarenga [facility],  Lauren Redd, RN, Marie Sarmiento, RUDDYFT; Louise Garcia OTR/L; Dr. Lang Leger MD    Ruth Johnston will participate in the Adult Partial Hospitalization Program 5 days per week, 5 hours per day. Anticipated duration/discharge: 2 weeks    Due to COVID-19, services will be delivered via telemedicine until further notice.     Program Start Date: 21  Anticipated Discharge Date: 21 (pending authorization/clinical changes)    NOTE: Complete CGI     Ruth Johnston would be at reasonable risk of requiring inpatient hospitalization in the absence of partial hospitalization.     Review Date: Does Ruth Johnston continue to meet criteria to participate in the Partial Hospitalization Program, 5 days per week; 5 hours per day?   21 yes   21 yes   21 No, discharging as scheduled     21 Yes, Parish Clay, LENA,  Licensed Clinical Psychologist         Client Strengths:  caring, creative, goal-focused, insightful, motivated, open to learning and willing to relate to others    Client Areas of Vulnerability:  Manic symptoms   Depressive and anxiety symptoms   Eating disorder  Cognitive impairment   Trauma/Abuse/Neglect    Client Participation in Plan:  Contributed to goals and plan   Attended individual treatment plan meeting on 21  Agrees with plan   Received copy of treatment plan   Discussed with staff     Long-Term Goals:  Knowledge about illness and management of symptoms   Maintenance of personal safety     Abuse Prevention Plan:  Safe, therapeutic environment   Safety coping plan as  "needed   Education regarding illness and skill development   Coordination with care providers     Discharge Criteria:  Satisfactory progress toward treatment goals   Ability to continue recovery at next level of service   Has a discharge plan in place        Areas of Treatment Focus       Area of Treatment Focus:  Personal Safety  Start Date:    8/5/21    Problem Description: from 7-21-21 DA  Safety Assessment:   \"Current Safety Concerns: She reported feeling down, depressed, and having thoughts of \"Why am I here?\" She stated it stems from not having a supportive family unit. She is not cared for and not supported and it is lingering. \"I've tried to make a useful life and help others and love other others and myself.\" She feels betrayed in the marriage and she feels like she doesn't want to be here. She wakes up in enough pain, and thinks \"I don't see an end to this.\" She denied suicidal plans and intent. She reported one incident of suicide intent, but she did not attempt - At age 19, she was driving herself to the hospital, and thought about driving off the bridge. She was \"having ideation and that's how far it goes\" because she reaches out for help.\"       Goal: Target Date: 8/11/21, discharge Status: Stopped  1) Safety:   Client will notify staff when needing assistance to develop or implement a coping plan to manage suicidal or self injurious urges.  Client will use coping plan for safety, as needed.    Progress:  8/5/21: Met with team member. Discussed program, process, and progress. Discussed and set treatment goals.  Harvey reports she hasn't had thoughts of suicide or self-harm since being in [facility].  She reports being concerned about eating disorder issues coming up again.  She is concerned about her diet and restricting.  She has copy of her safety plan and she is able to make a commitment to safety.  8/11/21: Met with team member. Discussed program, process, and progress. Harvey reports continued concerns " "about disordered eating behaviors, and has committed to eating two meals a day. Harvey is also reaching out to her support system to help with meals.  21 (Discharge): Met with team member. Discussed program, process, and progress. Harvey reports concerns regarding her former partner, and needs to work on self-care in coping with the divorce. Continue in day treatment.     Treatment Strategies:  Assist clients in establishing / strengthening support network  Assist to identify treatment goals  Assist with discharge planning  Engage in safety planning when indicated  Facilitate increased self-awareness  Provide education regarding distress tolerance skills     Area of Treatment Focus:  Symptom Management  Start Date:    21    Description: from 21 DA  Chief Complaint:   \"The reason for seeking services at this time is: \"Trauma/ depression/ anxiety\".  The problem(s) began 18. She was working with therapist Connie Nieto at [facility] for about 2 years until a few months ago. Patient is going through a divorce and is trying to save money, so she stopped going to therapy. The therapist was not trauma focused. She would like to go into trauma based therapy with a specialized therapist. She has been in therapy her whole life.  She knows there is hope. It's been a lot, but she is not feeling sorry for herself. She is very sensitive. She had a service animal that  a year ago. Her chidyllanua  2 days ago. It is grief upon grief. She has her cat. \"       Goal: Target Date: 21, discharge Status: Stopped  2) In Psychotherapy groups Harvey will:   Use psychotherapy process time for support in creating a life for herself beyond her losses.  Specifically, she will ask for support from the group in some of her difficult emotional challenges, of cleaning out her ex-'s things out of her home, and address other tasks she needs to take care of to move forward, where she faces emotional barriers.   " "  As measured by self report and staff observation during groups daily.       Progress:  8/5/21: Met with team member. Discussed program, process, and progress. Discussed and set treatment goals.  Harvey feels she has been doing better since being in PHP.  She has a lot strength in the skills because she has taught has helped her to move forward more quickly.  She reports continuing to emotionally deal with  from her  on April 5th.   8/11/21: Met with team member. Discussed program, process, and progress. Harvey reports continuing to grieve the relationship with her former  and the death of her dog three weeks ago. Harvey reports gratitude for being in the program and being able to feel her feelings.   8/13/21 (Discharge): Met with team member. Discussed program, process, and progress. Continue in day treatment.     Treatment Strategies:  Assist clients in establishing / strengthening support network  Assist to identify treatment goals  Assist with discharge planning  Engage in safety planning when indicated  Facilitate increased self awareness  Provide education regarding how to use group process, thoughts/behaviors/emotions management, emotional regulation, values identification, distorted thinking, grief and loss, shame, self compassion, self awareness, mindfulness, radical acceptance, distress tolerance skills, hope, problem solving. Communication/interpersonal effectiveness.      Area of Treatment Focus:  Develop / Improve Independent Living Skills  Start Date:    8/5/21    Description:    Functional Status (From DA completed on 7/21/2021):  Patient reports the following functional impairments: \"academic performance;educational activities;health maintenance;home life;management of the household and or completion of tasks;money management;organization;relationship(s);self care;work or vocational responsibilities.\"  OT Assessment completed on 8/3/2021.  Harvey reports disruption in daily " activities/tasks due to high level of depressive, anxiety, and trauma symptoms.  Focus in on getting more connected to her body and beginning to move her body more.      Goal: Target Date: 8/11/21, discharge Status: Stopped  3)  In Occupational Therapy groups Harvey will:    Lifestyle health: Harvey will walk her puppy 2-3 days/week, planning and problem solving barriers as needed.       Self-Regulation: Harvey will dry brush one part of her body daily and notice connection to her body to help develop increased comfort and connect to her body.  Harvey will ask for assistance/support as needed.     As measured by self report and staff observation during groups daily.     Progress:   8/5/21: Met with team members. Discussed program, process, and progress. Discussed and set treatment goals.  She has difficulty with eating and needs to clean out the cupboards because of food having her ex- food there makes eating more difficult.  Eating at least 2x a day.  She would like to start exercising regularly again, doing weight lifting and cardio, which has helped her mental health in the past.  8/11/21: Met with team member. Discussed program, process, and progress. Harvey reports continued commitment to eating two meals per day and daily movement. Harvey also reports weight lifting as helpful to her process.   8/13/21 (Discharge): Met with team member. Discussed program, process, and progress. Continue in day treatment.     Treatment Strategies:  Assist to identify treatment goals  Engage in safety planning when indicated  Facilitate increased self awareness  OT assessment  Provide education regarding:  Lifestyle Health: balance/structure/routine, goal setting and integration, prioritizing and planning, leisure values, behavior activation, weaving a mindful lifestyle and benefits of focused activity,.  Self regulation: sensory modulation, window of tolerance, ANS and vagus nerve activation, self awareness, development  of a self regulation plan, sensory enhanced mindfulness, sensory/body based grounding skills.  Resiliency development: Motivation, transitions, energy management, self compassion, stress management, positive thought processes, neuroscience of change.        Area of Treatment Focus:  Community Resources/Discharge Planning  Start Date:    8-5-21    Description:   Psychiatrist/Med Mgmt: Her PCP prescribes psych meds and she doesn't want to change.  Dr. Swain at [facility].   Individual Therapist: She is looking for a trauma based therapist.  She will look into [facility] for resources.  Harvey's care coordinator sent her a list of trauma specialized therapists.    Goal: Target Date: 8/11/21, discharge   Status: Stopped    4) Wellness and Discharge preparation:     Will improve wellness related behaviors by attending wellness sessions and ask questions as they come up.    Will increase effective use of support / increase ability to ask for help. Invite someone to the Support Network Education group to discuss your support needs.    Will develop an aftercare / transition plan by 8/13/21    Progress:  8/5/21: Met with team members. Discussed program, process, and progress. Discussed and set treatment goals.  Harvey will go to Adult Day treatment (ADT) after the partial program for continued therapy support.  She is scheduled to start ADT group 7B on 8/17/21. She is scheduled to start ADT group 7B on 8/17/21.  The 7B group meets Tuesday, Wednesday and Fridays from 1:00pm to 4:00pm.  8/11/21: Met with team member. Discussed program, process, and progress. Hravey reports calling her former therapist and has an appointment set up for next Monday 8/16/21. Harvey reports calling [facility] and is on a waitlist for equine therapy. Harvey also called a non-profit and plans to volunteer with horses. Harvey reports wanting to start in the 7B's on 8/20/21.   8/13/21 (Discharge): Met with team member.  "Discussed program, process, and progress. Harvey is scheduled to start the 7B track on Friday 8/20/21. Continue goals in day treatment.     Treatment Strategies:  Assist to identify treatment goals  Assist with discharge planning  Engage in safety planning when indicated  Facilitate increased self awareness  Provide education regarding eight dimensions of wellness. sleep hygiene. medication education and management. stigma. nutrition. signs and symptoms. community resources. support network education.      ROSE Helms/VANNESA Rodriguez on 8/5/2021 at 1:10 PM  VANNESA James on 8/11/2021 at 3:10 PM    NOTE: Required signatures are completed manually and scanned into the electronic medical record. See \"Media\" tab in epic.    The Individualized Treatment Plan Signature Page has been routed to the provider for co-sign.     I have reviewed the patient's Individualized Treatment Plan and agree with the current goals, interventions and level of care.     Parish Clay, LENA,  Licensed Clinical Psychologist    8/24/2021     "
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
## 2021-08-03 NOTE — GROUP NOTE
Psychotherapy Group Note    PATIENT'S NAME: Ruth Johnston  MRN:   [id number]  :   1965  ACCT. NUMBER: [id number]  DATE OF SERVICE: 21  START TIME:  4:00 PM  END TIME:  5:50 PM  FACILITATOR: Marie Sarmiento LMFT  TOPIC:  EBP Group: Social Support  [facility] Adult Partial Hospitalization Program  TRACK: 1    NUMBER OF PARTICIPANTS: 7 (plus support persons)    Summary of Group / Topics Discussed:  Social Support:   Meeting: Patients and their support system participated in a session on creating family and community-based support.  The importance and role of family/social support was stressed to increase the effectiveness of treatment. Patients and their support system were given information on PHP structure, diagnoses served within the program, in addition to key goals and objectives. Additionally, physical symptoms, thoughts, and behaviors commonly observed in psychopathology were presented and discussed.  Lastly, information was presented on ways to help promote and support recovery.  The patients and their support system benefitted from this session by working through an exercise designed to clarify their needs for assistance and support in order to enhance their recovery. Staff helped each clarify their roles, so that the patients can be in charge of their stabilization and recovery.      Patient Session Goals / Objectives:    Normalize aspects of mental health conditions and mental illness    Provide education on  normal  vs.  abnormal  aspects of mental health    Clarify the role of partial hospitalization programming    Reduce stigma associated with mental health diagnosis (es).    Provide social support system with techniques and strategies to improve communication and enhance empathy.    Improve interpersonal communication regarding symptoms    Build a sense of mastery and self-respect                                      Service Modality:  Video Visit      Telemedicine Visit: The patient's condition can be safely assessed and treated via synchronous audio and visual telemedicine encounter.      Reason for Telemedicine Visit: Services only offered telehealth    Originating Site (Patient Location): Patient's home    Distant Site (Provider Location): Provider Remote Setting- Home Office    Consent:  The patient/guardian has verbally consented to: the potential risks and benefits of telemedicine (video visit) versus in person care; bill my insurance or make self-payment for services provided; and responsibility for payment of non-covered services.     Patient would like the video invitation sent by:  My Chart    Mode of Communication:  Video Conference via Medical Zoom    As the provider I attest to compliance with applicable laws and regulations related to telemedicine.                Patient Participation / Response:  Fully participated with the group by sharing personal reflections / insights and openly received / provided feedback with other participants.    Harvey attended  night with several members of her family, friends, and neighbors.    Treatment Plan:  Patient has an initial individualized treatment plan that was created as part of their diagnostic assessment / admission process.  A master individualized treatment plan is in the process of being developed with the patient and multi-disciplinary care team.    VANNESA James

## 2021-08-04 ENCOUNTER — HOSPITAL ENCOUNTER (OUTPATIENT)
Dept: BEHAVIORAL HEALTH | Facility: CLINIC | Age: 56
End: 2021-08-04
Attending: PSYCHIATRY & NEUROLOGY
Payer: COMMERCIAL

## 2021-08-04 PROCEDURE — H0035 MH PARTIAL HOSP TX UNDER 24H: HCPCS | Mod: GT | Performed by: COUNSELOR

## 2021-08-04 PROCEDURE — H0035 MH PARTIAL HOSP TX UNDER 24H: HCPCS | Mod: GT

## 2021-08-04 PROCEDURE — H0035 MH PARTIAL HOSP TX UNDER 24H: HCPCS | Mod: GT,95

## 2021-08-04 NOTE — GROUP NOTE
Process Group Note    PATIENT'S NAME: Ruth Johnston  MRN:   [id number]  :   1965  ACCT. NUMBER: [id number]  DATE OF SERVICE: 21  START TIME:  1:00 PM  END TIME:  1:50 PM  FACILITATOR: Cheikh Burnett LMFT  TOPIC:  Process Group    Diagnoses:  Bipolar 2  PTSD    [facility] Adult Partial Hospitalization Program  TRACK: [facility]    NUMBER OF PARTICIPANTS: 7    Service Modality:  Video Visit     Telemedicine Visit: The patient's condition can be safely assessed and treated via synchronous audio and visual telemedicine encounter.      Reason for Telemedicine Visit: Services only offered telehealth and due to COVID-19.    Originating Site (Patient Location): Patient's home    Distant Site (Provider Location): Provider Remote Setting- Home Office    Consent:  The patient/guardian has verbally consented to: the potential risks and benefits of telemedicine (video visit) versus in person care; bill my insurance or make self-payment for services provided; and responsibility for payment of non-covered services.     Patient would like the video invitation sent by:  My Chart and email    Mode of Communication:  Video Conference via Medical Zoom    As the provider I attest to compliance with applicable laws and regulations related to telemedicine.          Data:    Session content: At the start of this group, patients were invited to check in by identifying themselves, describing their current emotional status, and identifying issues to address in this group.   Area(s) of treatment focus addressed in this session included Symptom Management, Personal Safety and Community Resources/Discharge Planning.  Patient reported feeling very tired and very anxious.   Patient discussed working toward getting connected with her body.   For skills they will use to address their goal(s), patient identified to asking for more help.   A barrier to working toward their goal(s) and/or addressing mental health symptoms the  patient identified was that it s uncomfortable to think of loving herself.  Patient reported no safety concerns and/or self-injurious behaviors. Patient reported no substance use. Patient reported they are taking their medications as prescribed, but she missed one dose this morning.   Patient reported feeling proud/grateful that they built their puppy a new outdoor play pen this morning.   Patient discussed with the treatment group that they are working toward getting connected with her body.    Therapeutic Interventions/Treatment Strategies:  Psychotherapist offered support, feedback and validation and reinforced use of skills. Treatment modalities used include Motivational Interviewing and Cognitive Behavioral Therapy. Interventions include Coping Skills: Assisted patient in identifying 1-2 healthy distraction skills to reduce overall distress, Symptoms Management: Promoted understanding of their diagnoses and how it impacts their functioning and Emotions Management:  Discussed barriers to emotional regulation.    Assessment:    Patient response:   Patient responded to session by accepting feedback, giving feedback, listening, focusing on goals, being attentive and accepting support    Possible barriers to participation / learning include: and no barriers identified    Health Issues:   None reported       Substance Use Review:   Substance Use: No active concerns identified.    Mental Status/Behavioral Observations  Appearance:   Appropriate   Eye Contact:   Good   Psychomotor Behavior: Normal   Attitude:   Cooperative   Orientation:   All  Speech   Rate / Production: Normal    Volume:  Normal   Mood:    Normal Depressed Anxious  Affect:    Appropriate   Thought Content:   Clear and Safety denies any current safety concerns including suicidal ideation, self-harm, and homicidal ideation  Thought Form:  Coherent  Logical     Insight:    Good     Plan:     Safety Plan: No current safety concerns identified.   Recommended that patient call 911 or go to the local ED should there be a change in any of these risk factors.     Barriers to treatment: None identified    Patient Contracts (see media tab):  None    Substance Use: Provided encouragement towards sobriety     Continue or Discharge: Patient will continue in Adult Partial Hospitalization Program (PHP)  as planned. Patient is likely to benefit from learning and using skills as they work toward the goals identified in their treatment plan.      Cheikh Burnett, RUDDYFT  August 4, 2021

## 2021-08-04 NOTE — GROUP NOTE
Psychoeducation Group Note    PATIENT'S NAME: Ruth Johnston  MRN:   [id number]  :   1965  ACCT. NUMBER: [id number]  DATE OF SERVICE: 21  START TIME: 10:00 AM  END TIME: 10:50 AM  FACILITATOR: Sharonda Redd RN  TOPIC: MH RN Group: Brain Health  [facility] Adult Partial Hospitalization Program  TRACK: 1    NUMBER OF PARTICIPANTS: 7    Summary of Group / Topics Discussed:  Brain Health:  Pathophysiology of stress and anxiety: Patients were educated on the difference between stress, chronic stress, and anxiety. The anatomy and pathophysiology of the body/brain were reviewed to illustrate the immediate effects of stress/anxiety in the body and the long term effects and increased risks for chronic disease that come from unmanaged stress/anxiety. Self-coping strategies to manage symptoms of stress were reviewed and pharmacologic, psychotherapeutic, and complementary treatment options were discussed.    Patient Session Goals / Objectives:  ? Described the differences between stress and anxiety and how the body responds to it  ? Listed the long term effects and increased risks for chronic disease that can arise from unmanaged stress/anxiety  ? Identified and described pharmacologic, psychotherapeutic, and complementary treatment options                                    Service Modality:  Video Visit     Telemedicine Visit: The patient's condition can be safely assessed and treated via synchronous audio and visual telemedicine encounter.      Reason for Telemedicine Visit: Services only offered telehealth    Originating Site (Patient Location): Patient's home    Distant Site (Provider Location): Provider Remote Setting- Home Office    Consent:  The patient/guardian has verbally consented to: the potential risks and benefits of telemedicine (video visit) versus in person care; bill my insurance or make self-payment for services provided; and responsibility for payment of non-covered services.      Patient would like the video invitation sent by:  My Chart    Mode of Communication:  Video Conference via Medical Zoom    As the provider I attest to compliance with applicable laws and regulations related to telemedicine.           Patient Participation / Response:  Fully participated with the group by sharing personal reflections / insights and openly received / provided feedback with other participants.    Verbalized understanding of brain health topic    Treatment Plan:  Patient has a current master individualized treatment plan.  See Epic treatment plan for more information.    Sharonda Redd RN

## 2021-08-04 NOTE — GROUP NOTE
Psychotherapy Group Note    PATIENT'S NAME: Ruth Johnston  MRN:   [id number]  :   1965  ACCT. NUMBER: [id number]  DATE OF SERVICE: 21  START TIME: 11:00 AM  END TIME: 11:50 AM  FACILITATOR: Marie Sarmiento LMFT  TOPIC: MH EBP Group: Social Support  [facility] Adult [facility] Hospitalization Program  TRACK: 1    NUMBER OF PARTICIPANTS: 8    Summary of Group / Topics Discussed:  Social Support:  Building and educating social support systems: The patients engaged in a discussion of how their mental health symptoms are related to psychosocial impairment. The patients also shared experiences of when stigma associated with mental illness has created barriers between them and their social network. The patients benefitted from this group by exploring ways in which they can re-engage their social network to decrease feelings of isolation and loneliness.    Patient Session Goals / Objectives:    Reduce overidentification/fusion with mental illness as being a primary definer of identity.     Identify ways that support network can assist with recovery.     Reduce stigma associated with mental health diagnosis(es).    Improve interpersonal communication regarding symptoms    Build a sense of mastery and self-respect                                    Service Modality:  Video Visit     Telemedicine Visit: The patient's condition can be safely assessed and treated via synchronous audio and visual telemedicine encounter.      Reason for Telemedicine Visit: Services only offered telehealth    Originating Site (Patient Location): Patient's home    Distant Site (Provider Location): Provider Remote Setting- Home Office    Consent:  The patient/guardian has verbally consented to: the potential risks and benefits of telemedicine (video visit) versus in person care; bill my insurance or make self-payment for services provided; and responsibility for payment of non-covered services.     Patient would like the  video invitation sent by:  My Chart    Mode of Communication:  Video Conference via Medical Zoom    As the provider I attest to compliance with applicable laws and regulations related to telemedicine.              Patient Participation / Response:  Fully participated with the group by sharing personal reflections / insights and openly received / provided feedback with other participants.    Treatment Plan:  Patient has a current master individualized treatment plan.  See Epic treatment plan for more information.    Marie Sarmiento LMFT

## 2021-08-04 NOTE — GROUP NOTE
Psychoeducation Group Note    PATIENT'S NAME: Ruth Johnston  MRN:   [id number]  :   1965  ACCT. NUMBER: [id number]  DATE OF SERVICE: 21  START TIME:  1:00 PM  END TIME:  1:50 PM  FACILITATOR: Sharonda Redd RN  TOPIC: MH RN Group: Brain Health  [facility] Adult Partial Hospitalization Program  TRACK: 1    NUMBER OF PARTICIPANTS: 8    Summary of Group / Topics Discussed:  Brain Health:  Pathophysiology of stress and anxiety: Patients were educated on the difference between stress, chronic stress, and anxiety. The anatomy and pathophysiology of the body/brain were reviewed to illustrate the immediate effects of stress/anxiety in the body and the long term effects and increased risks for chronic disease that come from unmanaged stress/anxiety. Self-coping strategies to manage symptoms of stress were reviewed and pharmacologic, psychotherapeutic, and complementary treatment options were discussed.    Patient Session Goals / Objectives:  ? Described the differences between stress and anxiety and how the body responds to it  ? Listed the long term effects and increased risks for chronic disease that can arise from unmanaged stress/anxiety  ? Identified and described pharmacologic, psychotherapeutic, and complementary treatment options                                    Service Modality:  Video Visit     Telemedicine Visit: The patient's condition can be safely assessed and treated via synchronous audio and visual telemedicine encounter.      Reason for Telemedicine Visit: Services only offered telehealth    Originating Site (Patient Location): Patient's home    Distant Site (Provider Location): @On license of [facility]@    Consent:  The patient/guardian has verbally consented to: the potential risks and benefits of telemedicine (video visit) versus in person care; bill my insurance or make self-payment for services provided; and responsibility for payment of non-covered services.     Patient would like the  video invitation sent by:  My Chart    Mode of Communication:  Video Conference via Medical Zoom    As the provider I attest to compliance with applicable laws and regulations related to telemedicine.           Patient Participation / Response:  Moderately participated, sharing some personal reflections / insights and adequately adequately received / provided feedback with other participants.    Identified / Expressed personal readiness to practice skills    Treatment Plan:  Patient has a current master individualized treatment plan.  See Epic treatment plan for more information.    Sharonda Redd RN

## 2021-08-05 ENCOUNTER — HOSPITAL ENCOUNTER (OUTPATIENT)
Dept: BEHAVIORAL HEALTH | Facility: CLINIC | Age: 56
End: 2021-08-05
Attending: PSYCHIATRY & NEUROLOGY
Payer: COMMERCIAL

## 2021-08-05 ENCOUNTER — TELEPHONE (OUTPATIENT)
Dept: BEHAVIORAL HEALTH | Facility: CLINIC | Age: 56
End: 2021-08-05

## 2021-08-05 PROCEDURE — H0035 MH PARTIAL HOSP TX UNDER 24H: HCPCS | Mod: GT,95 | Performed by: SOCIAL WORKER

## 2021-08-05 PROCEDURE — H0035 MH PARTIAL HOSP TX UNDER 24H: HCPCS | Mod: GT

## 2021-08-05 PROCEDURE — H0035 MH PARTIAL HOSP TX UNDER 24H: HCPCS | Mod: GT,95 | Performed by: COUNSELOR

## 2021-08-05 NOTE — GROUP NOTE
Psychotherapy Group Note    PATIENT'S NAME: Ruth Johnston  MRN:   [id number]  :   1965  ACCT. NUMBER: [id number]  DATE OF SERVICE: 21  START TIME: 11:00 AM  END TIME: 11:50 AM  FACILITATOR: Faby Alvarenga LICSW  TOPIC: MH EBP Group: Relationship Skills  [facility] Adult Partial Hospitalization Program  TRACK: 1    NUMBER OF PARTICIPANTS: 7    Summary of Group / Topics Discussed:  Relationship Skills: Boundaries: Patients were provided with a general overview of interpersonal boundaries and how lack of boundaries relates to symptoms and functioning. The purpose is to help patients identify boundary issues and gain awareness and skills to work towards healthier interpersonal boundaries. Current awareness of healthy boundary characteristics and barriers to establishing healthy boundaries were discussed.    Patient Session Goals / Objectives:    Familiarized patients with the concept of interpersonal boundaries and their characteristics    Discussed and practiced strategies to promote healthier interpersonal boundaries    Identified boundary issues and identified plan to improve boundaries    Viewed Benoit Andrew Talk on vulnerability                                        Service Modality:  Video Visit         Telemedicine Visit: The patient's condition can be safely assessed and treated via synchronous audio and visual telemedicine encounter.          Reason for Telemedicine Visit: Services only offered telehealth and covid19        Originating Site (Patient Location): Patient's home        Distant Site (Provider Location): Provider Remote Setting- Home Office        Consent:  The patient/guardian has verbally consented to: the potential risks and benefits of telemedicine (video visit) versus in person care; bill my insurance or make self-payment for services provided; and responsibility for payment of non-covered services.         Patient would like the video invitation sent by:  My  Chart        Mode of Communication:  Video Conference via Medical Zoom        As the provider I attest to compliance with applicable laws and regulations related to telemedicine.             Patient Participation / Response:  Fully participated with the group by sharing personal reflections / insights and openly received / provided feedback with other participants.    Demonstrated understanding of topics discussed through group discussion and participation and Demonstrated understanding of relationship skills and communication skills    Treatment Plan:  Patient has a current master individualized treatment plan.  See Epic treatment plan for more information.    Faby Alvarenga, ALEXSW

## 2021-08-05 NOTE — GROUP NOTE
Psychoeducation Group Note    PATIENT'S NAME: Ruth Johnston  MRN:   [id number]  :   1965  ACCT. NUMBER: [id number]  DATE OF SERVICE: 21  START TIME:  2:00 PM  END TIME:  2:50 PM  FACILITATOR: Sharonda Redd RN  TOPIC:  Life Skills Group: Life Skills  [facility] Adult Partial Hospitalization Program  TRACK: 1    NUMBER OF PARTICIPANTS: 7    Summary of Group / Topics Discussed:  Life Skills:  motivation vs procrastination  Patients will watch a short video by Dr Italo Rea which will highlight 5 techniques to employ to work with motivation in the context of depression    Patient Session Goals / Objectives:  ? Patients will discuss personal experiences with motivation  ? Patients will share techniques that have worked for them in the past  ? Patients will discuss and identify some other barriers to motivation in the context of depression                                    Service Modality:  Video Visit     Telemedicine Visit: The patient's condition can be safely assessed and treated via synchronous audio and visual telemedicine encounter.      Reason for Telemedicine Visit: Services only offered telehealth    Originating Site (Patient Location): Patient's home    Distant Site (Provider Location): Provider Remote Setting- Home Office    Consent:  The patient/guardian has verbally consented to: the potential risks and benefits of telemedicine (video visit) versus in person care; bill my insurance or make self-payment for services provided; and responsibility for payment of non-covered services.     Patient would like the video invitation sent by:  My Chart    Mode of Communication:  Video Conference via Medical Zoom    As the provider I attest to compliance with applicable laws and regulations related to telemedicine.               Patient Participation / Response:  Fully participated with the group by sharing personal reflections / insights and openly received / provided feedback with  other participants.    Verbalized understanding of content    Treatment Plan:  Patient has a current master individualized treatment plan.  See Epic treatment plan for more information.    Sharonda Redd RN

## 2021-08-05 NOTE — GROUP NOTE
Psychotherapy Group Note    PATIENT'S NAME: Ruth Johnston  MRN:   [id number]  :   1965  ACCT. NUMBER: [id number]  DATE OF SERVICE: 21  START TIME:  1:00 PM  END TIME:  1:50 PM  FACILITATOR: Faby Alvarenga LICSW  TOPIC:  EBP Group: Enhanced Mindfulness  [facility] Adult Partial Hospitalization Program  TRACK: 1    NUMBER OF PARTICIPANTS: 7    Summary of Group / Topics Discussed:  Enhanced Mindfulness: Body and Mind Integration: Patients received an overview and education regarding the importance of including the body in the management of emotional health and self-care and as a direct route to mindfulness practice.  Patients discussed various ways in which the body can serve as an informant to their physical and emotional experiences/need. Patients discussed the body as a direct link to the present moment and to mindfulness practice.  Patients discussed current relationship with body, self-awareness, mindfulness practice and barriers to connection with body.  Patients were guided through breath work and movement to facilitate greater self-awareness, grounding, self-expression, and connection to other.  Patients discussed how the experiential could be applied to better manage mental health and develop cortez connection to self.    Patient Session Goals / Objectives:    Identify how movement awareness could be used for grounding, stress management, self-expression, connection to other and self-regulation    Improved awareness of breath and movement preferences    Identify how movement and the body is used in mindfulness practice    Reflect on use of these practices in everyday life.    Identify barriers to attending to body                                        Service Modality:  Video Visit         Telemedicine Visit: The patient's condition can be safely assessed and treated via synchronous audio and visual telemedicine encounter.          Reason for Telemedicine Visit: Services only  offered telehealth and covid19        Originating Site (Patient Location): Patient's home        Distant Site (Provider Location): Provider Remote Setting- Home Office        Consent:  The patient/guardian has verbally consented to: the potential risks and benefits of telemedicine (video visit) versus in person care; bill my insurance or make self-payment for services provided; and responsibility for payment of non-covered services.         Patient would like the video invitation sent by:  My Chart        Mode of Communication:  Video Conference via Medical Zoom        As the provider I attest to compliance with applicable laws and regulations related to telemedicine.               Patient Participation / Response:  Fully participated with the group by sharing personal reflections / insights and openly received / provided feedback with other participants.    Demonstrated understanding of topics discussed through group discussion and participation and Practiced skills in session    Treatment Plan:  Patient has an initial individualized treatment plan that was created as part of their diagnostic assessment / admission process.  A master individualized treatment plan is in the process of being developed with the patient and multi-disciplinary care team.    ALEX MedinaSW

## 2021-08-05 NOTE — GROUP NOTE
Psychoeducation Group Note    PATIENT'S NAME: Ruth Johnston  MRN:   [id number]  :   1965  ACCT. NUMBER: [id number]  DATE OF SERVICE: 21  START TIME: 10:00 AM  END TIME: 10:50 AM  FACILITATOR: Sharonda Redd RN  TOPIC: MH RN Group: Brain Health  [facility] Adult Partial Hospitalization Program  TRACK: 1    NUMBER OF PARTICIPANTS: 7    Summary of Group / Topics Discussed:  Brain Health:  Pathophysiology of stress and anxiety: Patients were educated on the difference between stress, chronic stress, and anxiety. The anatomy and pathophysiology of the body/brain were reviewed to illustrate the immediate effects of stress/anxiety in the body and the long term effects and increased risks for chronic disease that come from unmanaged stress/anxiety. Self-coping strategies to manage symptoms of stress were reviewed and pharmacologic, psychotherapeutic, and complementary treatment options were discussed.    Patient Session Goals / Objectives:  ? Described the differences between stress and anxiety and how the body responds to it  ? Listed the long term effects and increased risks for chronic disease that can arise from unmanaged stress/anxiety  ? Identified and described pharmacologic, psychotherapeutic, and complementary treatment options                                    Service Modality:  Video Visit     Telemedicine Visit: The patient's condition can be safely assessed and treated via synchronous audio and visual telemedicine encounter.      Reason for Telemedicine Visit: Services only offered telehealth    Originating Site (Patient Location): Patient's home    Distant Site (Provider Location): Provider Remote Setting- Home Office    Consent:  The patient/guardian has verbally consented to: the potential risks and benefits of telemedicine (video visit) versus in person care; bill my insurance or make self-payment for services provided; and responsibility for payment of non-covered services.      Patient would like the video invitation sent by:  My Chart    Mode of Communication:  Video Conference via Medical Zoom    As the provider I attest to compliance with applicable laws and regulations related to telemedicine.           Patient Participation / Response:  Fully participated with the group by sharing personal reflections / insights and openly received / provided feedback with other participants.    Verbalized understanding of brain health topic    Treatment Plan:  Patient has a current master individualized treatment plan.  See Epic treatment plan for more information.    Sharonda Redd RN

## 2021-08-05 NOTE — GROUP NOTE
Process Group Note    PATIENT'S NAME: Ruth Johnston  MRN:   [id number]  :   1965  ACCT. NUMBER: [id number]  DATE OF SERVICE: 21  START TIME:  9:00 AM  END TIME:  9:50 AM  FACILITATOR: Cheikh Burnett LMFT  TOPIC:  Process Group    Diagnoses:  Bipolar 2  PTSD      [facility] Adult Partial Hospitalization Program  TRACK: [facility]    NUMBER OF PARTICIPANTS: 7    Service Modality:  Video Visit     Telemedicine Visit: The patient's condition can be safely assessed and treated via synchronous audio and visual telemedicine encounter.      Reason for Telemedicine Visit: Services only offered telehealth and due to COVID-19.    Originating Site (Patient Location): Patient's home    Distant Site (Provider Location): Provider Remote Setting- Home Office    Consent:  The patient/guardian has verbally consented to: the potential risks and benefits of telemedicine (video visit) versus in person care; bill my insurance or make self-payment for services provided; and responsibility for payment of non-covered services.     Patient would like the video invitation sent by:  My Chart and email    Mode of Communication:  Video Conference via Medical Zoom    As the provider I attest to compliance with applicable laws and regulations related to telemedicine.          Data:    Session content: At the start of this group, patients were invited to check in by identifying themselves, describing their current emotional status, and identifying issues to address in this group.   Area(s) of treatment focus addressed in this session included Symptom Management, Personal Safety and Community Resources/Discharge Planning.  Patient reported feeling good today and grateful for friends.   Patient discussed working toward get her puppy out for a walk and getting her bedroom set up for sleep hygiene.   For skills they will use to address their goal(s), patient identified organizational skills and getting rid of things that no  longer serving her.   A barrier to working toward their goal(s) and/or addressing mental health symptoms the patient identified was dealing with memories associated with losses.  Patient reported no safety concerns and/or self-injurious behaviors. Patient using some marijuana edibles to help sleep. Patient reported they are taking their medications as prescribed.   Patient reported feeling proud/grateful that they got out with the dog and asked people to come for family group.   Patient discussed with the treatment group that they are working toward get her puppy out for a walk and getting her bedroom set up for sleep hygiene.    Therapeutic Interventions/Treatment Strategies:  Psychotherapist offered support, feedback and validation and reinforced use of skills. Treatment modalities used include Motivational Interviewing and Cognitive Behavioral Therapy. Interventions include Coping Skills: Assisted patient in identifying 1-2 healthy distraction skills to reduce overall distress, Symptoms Management: Promoted understanding of their diagnoses and how it impacts their functioning and Emotions Management:  Discussed barriers to emotional regulation.    Assessment:    Patient response:   Patient responded to session by accepting feedback, giving feedback, listening, focusing on goals, being attentive and accepting support    Possible barriers to participation / learning include: and no barriers identified    Health Issues:   None reported       Substance Use Review:   Substance Use: Patient using some marijuana edibles to help sleep.    Mental Status/Behavioral Observations  Appearance:   Appropriate   Eye Contact:   Good   Psychomotor Behavior: Normal   Attitude:   Cooperative   Orientation:   All  Speech   Rate / Production: Normal    Volume:  Normal   Mood:    Normal Depressed Sad  Affect:    Appropriate   Thought Content:   Clear and Safety denies any current safety concerns including suicidal ideation, self-harm,  and homicidal ideation  Thought Form:  Coherent  Logical     Insight:    Good     Plan:     Safety Plan: No current safety concerns identified.  Recommended that patient call 911 or go to the local ED should there be a change in any of these risk factors.     Barriers to treatment: None identified    Patient Contracts (see media tab):  None    Substance Use: Provided encouragement towards sobriety     Continue or Discharge: Patient will continue in Adult Partial Hospitalization Program (PHP)  as planned. Patient is likely to benefit from learning and using skills as they work toward the goals identified in their treatment plan.      Cheikh Burnett, RUDDYFT  August 5, 2021

## 2021-08-06 ENCOUNTER — HOSPITAL ENCOUNTER (OUTPATIENT)
Dept: BEHAVIORAL HEALTH | Facility: CLINIC | Age: 56
End: 2021-08-06
Attending: FAMILY MEDICINE
Payer: COMMERCIAL

## 2021-08-06 PROCEDURE — H0035 MH PARTIAL HOSP TX UNDER 24H: HCPCS | Mod: GT,95 | Performed by: COUNSELOR

## 2021-08-06 PROCEDURE — H0035 MH PARTIAL HOSP TX UNDER 24H: HCPCS | Mod: GT,95

## 2021-08-06 NOTE — GROUP NOTE
"Psychoeducation Group Note    PATIENT'S NAME: Ruth Johnston  MRN:   [id number]  :   1965  ACCT. NUMBER: [id number]  DATE OF SERVICE: 21  START TIME:  2:00 PM  END TIME:  2:50 PM  FACILITATOR: Leola Nichols RN  TOPIC: MH Life Skills Group: Lifestyle Balance and Structure  [facility] Adult Partial Hospitalization Program  TRACK: ONE    NUMBER OF PARTICIPANTS: 6    Summary of Group / Topics Discussed:  Weekend Wellness Balance and Strucure:  Weekend wellness planning. Patients were introduced to the process and benefits of structure and balance to help support their ability to follow through with meaningful personal, health, recovery and treatment goals that they would like to achieve to improve overall functioning. Patients also reported on End of week reflection. Pt's reviewed and discussed handout titled \"Weekend wellness planning\" and \"end of week reflection\" and discussed.    Patient Session Goals / Objectives:     Discussed strategies/ skills could you use to help you self-regulate when experiencing discomfort or distress.    Identified and problem solved barriers to achieving goals     Identified plan to support follow through on goals and reflection on progress made    Stated intentions and priorities for structure, routines, developing habits, and balance                                        Service Modality:  Video Visit     Telemedicine Visit: The patient's condition can be safely assessed and treated via synchronous audio and visual telemedicine encounter.      Reason for Telemedicine Visit: COVID 19    Originating Site (Patient Location): Patient's home    Distant Site (Provider Location): Provider Remote Setting- Home Office    Consent:  The patient/guardian has verbally consented to: the potential risks and benefits of telemedicine (video visit) versus in person care; bill my insurance or make self-payment for services provided; and responsibility for payment of non-covered " services.  Patient would like the video invitation sent by:  My Chart    Mode of Communication:  Video Conference via Medical Zoom    As the provider I attest to compliance with applicable laws and regulations related to telemedicine.                Patient Participation / Response:  Fully participated with the group by sharing personal reflections / insights and openly received / provided feedback with other participants.    Verbalized understanding of content    Treatment Plan:  Patient has a current master individualized treatment plan.  See Epic treatment plan for more information.    Leola Nichols RN

## 2021-08-06 NOTE — PROGRESS NOTES
Acknowledgement of Current Treatment Plan       I have reviewed my treatment plan with my therapist / counselor on 08/05/21.   I agree with the plan as it is written in the electronic health record.    Name:      Signature:  Ruth Johnston Patient agrees with the plan.   Patient is unable to sign due to COVID-19.  VANNESA Erazo on 8/5/2021 at 1:45 PM     Dr. Lang Leger MD  Psychiatrist    Cheikh Burnett MA, LMFT VANNESA Erazo on 8/5/2021 at 1:45 PM

## 2021-08-06 NOTE — GROUP NOTE
Process Group Note    PATIENT'S NAME: Ruth Johnston  MRN:   [id number]  :   1965  ACCT. NUMBER: [id number]  DATE OF SERVICE: 21  START TIME:  9:00 AM  END TIME:  9:50 AM  FACILITATOR: Cheikh Burnett LMFT  TOPIC:  Process Group    Diagnoses:  Bipolar 2  PTSD      [facility] Adult Partial Hospitalization Program  TRACK: [facility]    NUMBER OF PARTICIPANTS: 7    Service Modality:  Video Visit     Telemedicine Visit: The patient's condition can be safely assessed and treated via synchronous audio and visual telemedicine encounter.      Reason for Telemedicine Visit: Services only offered telehealth and due to COVID-19.    Originating Site (Patient Location): Patient's home    Distant Site (Provider Location): Provider Remote Setting- Home Office    Consent:  The patient/guardian has verbally consented to: the potential risks and benefits of telemedicine (video visit) versus in person care; bill my insurance or make self-payment for services provided; and responsibility for payment of non-covered services.     Patient would like the video invitation sent by:  My Chart and email    Mode of Communication:  Video Conference via Medical Zoom    As the provider I attest to compliance with applicable laws and regulations related to telemedicine.          Data:    Session content: At the start of this group, patients were invited to check in by identifying themselves, describing their current emotional status, and identifying issues to address in this group.   Area(s) of treatment focus addressed in this session included Symptom Management, Personal Safety and Community Resources/Discharge Planning.  Patient reported feeling tired.  The efforts of recovery are exhausting.   Patient discussed working toward getting her room done.  Doing one drawer at a time.   For skills they will use to address their goal(s), patient identified grouping things together.   A barrier to working toward their goal(s) and/or  addressing mental health symptoms the patient identified was feeling overwhelmed.  Patient reported no safety concerns and/or self-injurious behaviors. Patient reported no substance use. Patient reported they are taking their medications as prescribed.   Patient reported feeling proud/grateful that they wrote down a list for what to do on her room.  Being in group.   Patient discussed with the treatment group that they deal with loneliness.    Therapeutic Interventions/Treatment Strategies:  Psychotherapist offered support, feedback and validation and reinforced use of skills. Treatment modalities used include Motivational Interviewing and Cognitive Behavioral Therapy. Interventions include Coping Skills: Assisted patient in identifying 1-2 healthy distraction skills to reduce overall distress, Symptoms Management: Promoted understanding of their diagnoses and how it impacts their functioning and Emotions Management:  Discussed barriers to emotional regulation.    Assessment:    Patient response:   Patient responded to session by accepting feedback, giving feedback, listening, focusing on goals, being attentive and accepting support    Possible barriers to participation / learning include: and no barriers identified    Health Issues:   None reported       Substance Use Review:   Substance Use: No active concerns identified.    Mental Status/Behavioral Observations  Appearance:   Appropriate   Eye Contact:   Good   Psychomotor Behavior: Normal   Attitude:   Cooperative   Orientation:   All  Speech   Rate / Production: Normal    Volume:  Normal   Mood:    Normal Depressed Anxious  Affect:    Appropriate   Thought Content:   Clear and Safety denies any current safety concerns including suicidal ideation, self-harm, and homicidal ideation  Thought Form:  Coherent  Logical     Insight:    Good     Plan:     Safety Plan: No current safety concerns identified.  Recommended that patient call 911 or go to the local ED should  there be a change in any of these risk factors.     Barriers to treatment: None identified    Patient Contracts (see media tab):  None    Substance Use: Provided encouragement towards sobriety     Continue or Discharge: Patient will continue in Adult Partial Hospitalization Program (PHP)  as planned. Patient is likely to benefit from learning and using skills as they work toward the goals identified in their treatment plan.      Cheikh Burnett, VANNESA  August 5, 2021

## 2021-08-06 NOTE — GROUP NOTE
Process Group Note    PATIENT'S NAME: Ruth Johnston  MRN:   [id number]  :   1965  ACCT. NUMBER: [id number]  DATE OF SERVICE: 21  START TIME:  9:00 AM  END TIME: 10:50 AM  FACILITATOR: Maire Sarmiento LMFT  TOPIC:  Process Group    Diagnoses:  Bipolar 2  PTSD    [facility] Adult Partial Hospitalization Program  TRACK: 1    NUMBER OF PARTICIPANTS: 6                                      Service Modality:  Video Visit     Telemedicine Visit: The patient's condition can be safely assessed and treated via synchronous audio and visual telemedicine encounter.      Reason for Telemedicine Visit: Services only offered telehealth    Originating Site (Patient Location): Patient's home    Distant Site (Provider Location): Provider Remote Setting- Home Office    Consent:  The patient/guardian has verbally consented to: the potential risks and benefits of telemedicine (video visit) versus in person care; bill my insurance or make self-payment for services provided; and responsibility for payment of non-covered services.     Patient would like the video invitation sent by:  My Chart    Mode of Communication:  Video Conference via Medical Zoom    As the provider I attest to compliance with applicable laws and regulations related to telemedicine.                Data:    Session content: At the start of this group, patients were invited to check in by identifying themselves, describing their current emotional status, and identifying issues to address in this group.   Area(s) of treatment focus addressed in this session included Symptom Management, Personal Safety and Community Resources/Discharge Planning.    Harvey reported feeling emotionally exhausted and also excited about growth today. Patient identified the following goal(s) to work towards today: connect with body and slow down, did dishes and cleaned counters. Patient plans to use the following skills to achieve their goal: implementing rest.  Patient anticipates the following barriers that may interfere with achieving their goal: a long to do list that may distract from rest. Denies safety concerns, denies chemical use, and reports taking their medications as prescribed. Patient reports feeling proud of/grateful for: set a 20-minute timer to do the kitchen routine and was able to do it. Patient asked for the following supports from the group today: feedback.    Therapeutic Interventions/Treatment Strategies:  Psychotherapist offered support, feedback and validation and reinforced use of skills. Treatment modalities used include Motivational Interviewing, Cognitive Behavioral Therapy and Dialectical Behavioral Therapy.    Assessment:    Patient response:   Patient responded to session by accepting feedback, giving feedback, listening, focusing on goals, being attentive and accepting support    Possible barriers to participation / learning include: and no barriers identified    Health Issues:   None reported       Substance Use Review:   Substance Use: No active concerns identified.    Mental Status/Behavioral Observations  Appearance:   Appropriate   Eye Contact:   Good   Psychomotor Behavior: Normal   Attitude:   Cooperative  Interested Friendly Pleasant  Orientation:   All  Speech   Rate / Production: Normal    Volume:  Normal   Mood:    Normal  Affect:    Appropriate   Thought Content:   Clear  Thought Form:  Coherent  Goal Directed  Logical     Insight:    Good  and Fair     Plan:     Safety Plan: Committed to safety and agreed to follow previously developed safety coping plan.     No current safety concerns identified.  Recommended that patient call 911 or go to the local ED should there be a change in any of these risk factors.     Barriers to treatment: None identified    Patient Contracts (see media tab):  None    Substance Use: Not addressed in session     Continue or Discharge: Patient will continue in Adult Partial Hospitalization Program (PHP)   as planned. Patient is likely to benefit from learning and using skills as they work toward the goals identified in their treatment plan.      Marie Sarmiento, VANNESA  August 6, 2021

## 2021-08-06 NOTE — GROUP NOTE
"Psychoeducation Group Note    PATIENT'S NAME: Ruth Johnston  MRN:   [id number]  :   1965  ACCT. NUMBER: [id number]  DATE OF SERVICE: 21  START TIME:  1:00 PM  END TIME:  1:50 PM  FACILITATOR: Leola Nichols RN  TOPIC:  Life Skills Group: Life Skills  [facility] Adult Partial Hospitalization Program  TRACK: One    NUMBER OF PARTICIPANTS: 6    Summary of Group / Topics Discussed:  Life Skills:  Patients reviewed handout titled \"Tuning into the mind, body, and emotions\" and     Patient Session Goals / Objectives:  ? Articulated words to describe alertness, physical feelings, mood, and identify any stress they feel.  ? Identified discomfort or feelings of distress.  ? Identified strategies to cope with feelings of distress.                                    Service Modality:  Video Visit     Telemedicine Visit: The patient's condition can be safely assessed and treated via synchronous audio and visual telemedicine encounter.      Reason for Telemedicine Visit:  COVID 19    Originating Site (Patient Location): Patient's home    Distant Site (Provider Location): Provider Remote Setting- Home Office    Consent:  The patient/guardian has verbally consented to: the potential risks and benefits of telemedicine (video visit) versus in person care; bill my insurance or make self-payment for services provided; and responsibility for payment of non-covered services.     Patient would like the video invitation sent by:  My Chart    Mode of Communication:  Video Conference via Medical Zoom    As the provider I attest to compliance with applicable laws and regulations related to telemedicine.              Patient Participation / Response:  Fully participated with the group by sharing personal reflections / insights and openly received / provided feedback with other participants.    Verbalized understanding of content and Patient worked towards initial treatment plan goals     Treatment Plan:  Patient " has a current master individualized treatment plan.  See Epic treatment plan for more information.    Leola Nichols RN

## 2021-08-09 ENCOUNTER — HOSPITAL ENCOUNTER (OUTPATIENT)
Dept: BEHAVIORAL HEALTH | Facility: CLINIC | Age: 56
End: 2021-08-09
Attending: FAMILY MEDICINE
Payer: COMMERCIAL

## 2021-08-09 PROCEDURE — H0035 MH PARTIAL HOSP TX UNDER 24H: HCPCS | Mod: GT,95

## 2021-08-09 PROCEDURE — H0035 MH PARTIAL HOSP TX UNDER 24H: HCPCS | Mod: GT,95 | Performed by: COUNSELOR

## 2021-08-09 NOTE — GROUP NOTE
Psychotherapy Group Note    PATIENT'S NAME: Ruth Johnston  MRN:   [id number]  :   1965  ACCT. NUMBER: [id number]  DATE OF SERVICE: 21  START TIME: 11:00 AM  END TIME: 11:50 AM  FACILITATOR: Cheikh Burnett LMFT  TOPIC: MH EBP Group: Specialty Awareness  [facility] Adult Partial Hospitalization Program  TRACK: [facility]    NUMBER OF PARTICIPANTS: 6    Summary of Group / Topics Discussed:  Specialty Topics: Hope: The topic of hope was presented in order to help patients better understand the symptoms of hopelessness and how to become more hopeful. Patients discussed their current awareness of the topic and relevance to their functioning. Individual experiences with symptoms and treatment options were also discussed. Patients explored options for ongoing/future treatment and symptom management.      Patient Session Goals / Objectives:    Discussed definition of hopelessness    Discussed how hopelessness impacts functioning    Set a plan to utilize skills to reduce hopelessness    Service Modality:  Video Visit     Telemedicine Visit: The patient's condition can be safely assessed and treated via synchronous audio and visual telemedicine encounter.      Reason for Telemedicine Visit: Services only offered telehealth and due to COVID-19.    Originating Site (Patient Location): Patient's home    Distant Site (Provider Location): Provider Remote Setting- Home Office    Consent:  The patient/guardian has verbally consented to: the potential risks and benefits of telemedicine (video visit) versus in person care; bill my insurance or make self-payment for services provided; and responsibility for payment of non-covered services.     Patient would like the video invitation sent by:  My Chart and email    Mode of Communication:  Video Conference via Medical Zoom    As the provider I attest to compliance with applicable laws and regulations related to telemedicine.      Patient Participation /  Response:  Fully participated with the group by sharing personal reflections / insights and openly received / provided feedback with other participants.    Demonstrated understanding of topics discussed through group discussion and participation, Verbalized understanding of ways to proactively manage illness and Practiced skills in session    Treatment Plan:  Patient has a current master individualized treatment plan.  See Epic treatment plan for more information.    Cheikh Burnett, RUDDYFT

## 2021-08-09 NOTE — GROUP NOTE
Psychoeducation Group Note    PATIENT'S NAME: Ruth Johnston  MRN:   [id number]  :   1965  ACCT. NUMBER: [id number]  DATE OF SERVICE: 21  START TIME:  9:00 AM  END TIME:  9:50 AM  FACILITATOR: Sharonda Redd RN  TOPIC: LUDIN RN Group: Mental Health Maintenance  [facility] Adult Partial Hospitalization Program  TRACK: 1    NUMBER OF PARTICIPANTS: 4    Summary of Group / Topics Discussed:  Mental Health Maintenance:  Assessments of Strengths: Patients completed a self-reflection on personal strengths worksheet. The concept of personal strength as it relates to resilience were explored. Patients shared responses with the group and participated in discussion.     Patient Session Goals / Objectives:  ? Patients identified 1-3 qualities they consider a personal strength.  ? Patients understood the concept of personal strengths and the connection it has to resiliency                                    Service Modality:  Video Visit     Telemedicine Visit: The patient's condition can be safely assessed and treated via synchronous audio and visual telemedicine encounter.      Reason for Telemedicine Visit: Services only offered telehealth    Originating Site (Patient Location): Patient's home    Distant Site (Provider Location): Provider Remote Setting- Home Office    Consent:  The patient/guardian has verbally consented to: the potential risks and benefits of telemedicine (video visit) versus in person care; bill my insurance or make self-payment for services provided; and responsibility for payment of non-covered services.     Patient would like the video invitation sent by:  My Chart    Mode of Communication:  Video Conference via Medical Zoom    As the provider I attest to compliance with applicable laws and regulations related to telemedicine.           Patient Participation / Response:  Fully participated with the group by sharing personal reflections / insights and openly received / provided  feedback with other participants.    Verbalized understanding of mental health maintenance topic    Treatment Plan:  Patient has a current master individualized treatment plan.  See Epic treatment plan for more information.    Sharonda Redd RN

## 2021-08-09 NOTE — GROUP NOTE
Psychotherapy Group Note    PATIENT'S NAME: Ruth Johnston  MRN:   [id number]  :   1965  ACCT. NUMBER: [id number]  DATE OF SERVICE: 21  START TIME: 11:00 AM  END TIME: 11:50 AM  FACILITATOR: Marie Sarmiento LMFT  TOPIC: MH EBP Group: Cognitive Restructuring  [facility] Adult Partial Hospitalization Program  TRACK: 1    NUMBER OF PARTICIPANTS: 5    Summary of Group / Topics Discussed:  Cognitive Restructuring: Distortions: Patients received an overview of how to identify common cognitive distortions. Patients will explore alternatives to cognitive distortions and practice challenging their negative thought patterns. The goal is to help patients target modify ineffective thought patterns.     Patient Session Goals / Objectives:    Familiarized self with ineffective / unhealthy thoughts and how they develop.      Explored impact of ineffective thoughts / distortions on mood and activity    Formulated new neutral/positive alternatives to challenge less helpful / ineffective thoughts.    Practiced and plan to apply in daily life                                           Service Modality:  Video Visit     Telemedicine Visit: The patient's condition can be safely assessed and treated via synchronous audio and visual telemedicine encounter.      Reason for Telemedicine Visit: Services only offered telehealth    Originating Site (Patient Location): Patient's home    Distant Site (Provider Location): Provider Remote Setting- Home Office    Consent:  The patient/guardian has verbally consented to: the potential risks and benefits of telemedicine (video visit) versus in person care; bill my insurance or make self-payment for services provided; and responsibility for payment of non-covered services.     Patient would like the video invitation sent by:  My Chart    Mode of Communication:  Video Conference via Medical Zoom    As the provider I attest to compliance with applicable laws and regulations  related to telemedicine.            Patient Participation / Response:  Fully participated with the group by sharing personal reflections / insights and openly received / provided feedback with other participants.    Demonstrated understanding of topics discussed through group discussion and participation, Expressed understanding of the relationship between behaviors, thoughts, and feelings and Practiced skills in session    Treatment Plan:  Patient has a current master individualized treatment plan.  See Epic treatment plan for more information.    Marie Sarmiento LMFT

## 2021-08-09 NOTE — GROUP NOTE
Psychoeducation Group Note    PATIENT'S NAME: Ruth Johnston  MRN:   [id number]  :   1965  ACCT. NUMBER: [id number]  DATE OF SERVICE: 21  START TIME:  1:00 PM  END TIME:  1:50 PM  FACILITATOR: Sharonda Redd RN  TOPIC: LUDIN RN Group: Mental Health Maintenance  [facility] Adult Partial Hospitalization Program  TRACK: 1    NUMBER OF PARTICIPANTS: 5    Summary of Group / Topics Discussed:  Mental Health Maintenance:  Social/Coping Bingo: Patients were educated on the importance of balance in meeting our wellness needs. Topic of social/coping was reviewed and patients participated in playing a verbal response style coping/social BINGO game. In this game, patients were challenged to utilize their understanding of themselves and their coping strategies to respond to the questions on their BINGO cards.    Patient Session Goals / Objectives:  ? Identified the importance of balance in wellness  ? Explained the important aspects of socialization/effective coping strategies  ? Listed ways of improving weak areas in social/coping skills                                      Service Modality:  Video Visit     Telemedicine Visit: The patient's condition can be safely assessed and treated via synchronous audio and visual telemedicine encounter.      Reason for Telemedicine Visit: Services only offered telehealth    Originating Site (Patient Location): Patient's home    Distant Site (Provider Location): Provider Remote Setting- Home Office    Consent:  The patient/guardian has verbally consented to: the potential risks and benefits of telemedicine (video visit) versus in person care; bill my insurance or make self-payment for services provided; and responsibility for payment of non-covered services.     Patient would like the video invitation sent by:  My Chart    Mode of Communication:  Video Conference via Medical Zoom    As the provider I attest to compliance with applicable laws and regulations related  to telemedicine.           Patient Participation / Response:  Fully participated with the group by sharing personal reflections / insights and openly received / provided feedback with other participants.    Verbalized understanding of mental health maintenance topic    Treatment Plan:  Patient has an initial individualized treatment plan that was created as part of their diagnostic assessment / admission process.  A master individualized treatment plan is in the process of being developed with the patient and multi-disciplinary care team.    Sharonda Redd RN

## 2021-08-09 NOTE — ADDENDUM NOTE
Encounter addended by: Cheikh Burnett LMFT on: 8/9/2021 1:35 PM   Actions taken: Charge Capture section accepted

## 2021-08-09 NOTE — GROUP NOTE
Psychoeducation Group Note    PATIENT'S NAME: Ruth Johnston  MRN:   [id number]  :   1965  ACCT. NUMBER: [id number]  DATE OF SERVICE: 21  START TIME:  2:00 PM  END TIME:  2:50 PM  FACILITATOR: Sharonda Redd RN  TOPIC: LUDIN RN Group: Brain Health  [facility] Adult Partial Hospitalization Program  TRACK: 1    NUMBER OF PARTICIPANTS: 4    Summary of Group / Topics Discussed:  Brain Health:  Cognitive skill building: Optimal cognition is necessary for active engagement in life and to maintain health promoting behaviors such as remembering to attend appointments and take medications to promote wellness. Assessing cognition incorporates evaluating level of consciousness and attentiveness, motor and speech activity, thought and perception, and higher cognitive abilities such as alertness, language, memory, constructional ability, and abstract reasoning. A verbal game was used to improve memory and cognitive function through solving a puzzle and using assertive communication.    Patient Session Goals / Objectives:  ? Maintained focus while practicing cognitive function and memory building exercise  ? Utilized improving assertiveness skills to effectively communicate with group members                                    Service Modality:  Video Visit     Telemedicine Visit: The patient's condition can be safely assessed and treated via synchronous audio and visual telemedicine encounter.      Reason for Telemedicine Visit: Services only offered telehealth    Originating Site (Patient Location): Patient's home    Distant Site (Provider Location): Provider Remote Setting- Home Office    Consent:  The patient/guardian has verbally consented to: the potential risks and benefits of telemedicine (video visit) versus in person care; bill my insurance or make self-payment for services provided; and responsibility for payment of non-covered services.     Patient would like the video invitation sent by:  My  Chart    Mode of Communication:  Video Conference via Medical Zoom    As the provider I attest to compliance with applicable laws and regulations related to telemedicine.           Patient Participation / Response:  Fully participated with the group by sharing personal reflections / insights and openly received / provided feedback with other participants.    Verbalized understanding of brain health topic    Treatment Plan:  Patient has a current master individualized treatment plan.  See Epic treatment plan for more information.    Sharonda Redd RN       Which Photosensitizer Was Used: Levulan

## 2021-08-10 ENCOUNTER — HOSPITAL ENCOUNTER (OUTPATIENT)
Dept: BEHAVIORAL HEALTH | Facility: CLINIC | Age: 56
End: 2021-08-10
Attending: FAMILY MEDICINE
Payer: COMMERCIAL

## 2021-08-10 PROCEDURE — H0035 MH PARTIAL HOSP TX UNDER 24H: HCPCS | Mod: GT

## 2021-08-10 PROCEDURE — H0035 MH PARTIAL HOSP TX UNDER 24H: HCPCS | Mod: GT | Performed by: COUNSELOR

## 2021-08-10 PROCEDURE — H0035 MH PARTIAL HOSP TX UNDER 24H: HCPCS | Mod: GT,95

## 2021-08-10 NOTE — GROUP NOTE
Psychotherapy Group Note    PATIENT'S NAME: Ruth Johnston  MRN:   [id number]  :   1965  ACCT. NUMBER: [id number]  DATE OF SERVICE: 8/10/21  START TIME: 11:00 AM  END TIME: 11:50 AM  FACILITATOR: Cheikh Burnett LMFT  TOPIC:  EBP Group: Behavioral Activation  [facility] Adult Partial Hospitalization Program  TRACK: [facility]    NUMBER OF PARTICIPANTS: 5    Summary of Group / Topics Discussed:  Behavioral Activation: The Change Process - Behavior Change: Patients explored the process and types of change, including but not limited to, theories of change, steps to making change, methods of changing behavior, and potential barriers.  Patients worked to identify what changes may benefit their daily lives, and work towards a plan to implement change.      Patient Session Goals / Objectives:    Demonstrate understanding of the change process.      Identify positive and negative behavioral patterns.    Make plans to track and implement changes and share experiences in group.    Identify personal barriers to change    Service Modality:  Video Visit     Telemedicine Visit: The patient's condition can be safely assessed and treated via synchronous audio and visual telemedicine encounter.      Reason for Telemedicine Visit: Services only offered telehealth and due to COVID-19.    Originating Site (Patient Location): Patient's home    Distant Site (Provider Location): Provider Remote Setting- Home Office    Consent:  The patient/guardian has verbally consented to: the potential risks and benefits of telemedicine (video visit) versus in person care; bill my insurance or make self-payment for services provided; and responsibility for payment of non-covered services.     Patient would like the video invitation sent by:  My Chart and email    Mode of Communication:  Video Conference via Medical Zoom    As the provider I attest to compliance with applicable laws and regulations related to telemedicine.      Patient  Participation / Response:  Fully participated with the group by sharing personal reflections / insights and openly received / provided feedback with other participants.    Demonstrated understanding of topics discussed through group discussion and participation, Shared experiences and challenges with making behavioral changes, Identified / Expressed personal readiness to make behavioral change and Practiced skills in session    Treatment Plan:  Patient has a current master individualized treatment plan.  See Epic treatment plan for more information.    Cheikh Burnett, RUDDYFT

## 2021-08-10 NOTE — GROUP NOTE
Psychoeducation Group Note    PATIENT'S NAME: Ruth Johnston  MRN:   [id number]  :   1965  ACCT. NUMBER: [id number]  DATE OF SERVICE: 8/10/21  START TIME: 10:00 AM  END TIME: 10:50 AM  FACILITATOR: Sharonda Redd RN  TOPIC:  Life Skills Group: Life Skills  [facility] Adult Partial Hospitalization Program  TRACK: 1    NUMBER OF PARTICIPANTS: 5    Summary of Group / Topics Discussed:  Life Skills:  life tree exercise.  Pts will be guided through a non-analytical exercise that helps to highlight the positives in our lives, take stock, remind ourselves of values, and influences.       Patient Session Goals / Objectives:  ? Pts will complete the exercise   ? Pts will share highlights to their comfort level                                    Service Modality:  Video Visit     Telemedicine Visit: The patient's condition can be safely assessed and treated via synchronous audio and visual telemedicine encounter.      Reason for Telemedicine Visit: Services only offered telehealth    Originating Site (Patient Location): Patient's home    Distant Site (Provider Location): Provider Remote Setting- Home Office    Consent:  The patient/guardian has verbally consented to: the potential risks and benefits of telemedicine (video visit) versus in person care; bill my insurance or make self-payment for services provided; and responsibility for payment of non-covered services.     Patient would like the video invitation sent by:  My Chart    Mode of Communication:  Video Conference via Medical Zoom    As the provider I attest to compliance with applicable laws and regulations related to telemedicine.               Patient Participation / Response:  Fully participated with the group by sharing personal reflections / insights and openly received / provided feedback with other participants.    Verbalized understanding of content    Treatment Plan:  Patient has a current master individualized treatment plan.  See  Epic treatment plan for more information.    Sharonda Redd RN

## 2021-08-10 NOTE — GROUP NOTE
Psychotherapy Group Note    PATIENT'S NAME: Ruth Johnston  MRN:   [id number]  :   1965  ACCT. NUMBER: [id number]  DATE OF SERVICE: 8/10/21  START TIME:  1:00 PM  END TIME:  1:50 PM  FACILITATOR: Cheikh Burnett LMFT  TOPIC:  EBP Group: Self-Awareness  [facility] Adult Partial Hospitalization Program  TRACK: [facility]    NUMBER OF PARTICIPANTS: 4    Summary of Group / Topics Discussed:  Self-Awareness: Values: Patients identified personal values by examining development of their current values and how their values influence their daily functioning and life choices. Patients explored the impact of their values on their thoughts, feelings, and actions. Patients discussed definition of personal values and how they develop and change over time. The goal is to help patients reconcile value conflicts and achieve balance and flexibility to improve mood and daily functioning.     Patient Session Goals / Objectives:    Examined development of values and impact of values on functioning    Identified and prioritized important values related to current well-being     Identified strategies to change or enhance values to positively impact symptoms    Assisted patients to find ways to adapt functioning to better fit their values    Service Modality:  Video Visit     Telemedicine Visit: The patient's condition can be safely assessed and treated via synchronous audio and visual telemedicine encounter.      Reason for Telemedicine Visit: Services only offered telehealth and due to COVID-19.    Originating Site (Patient Location): Patient's home    Distant Site (Provider Location): Provider Remote Setting- Home Office    Consent:  The patient/guardian has verbally consented to: the potential risks and benefits of telemedicine (video visit) versus in person care; bill my insurance or make self-payment for services provided; and responsibility for payment of non-covered services.     Patient would like the video  invitation sent by:  My Chart and email    Mode of Communication:  Video Conference via Medical Zoom    As the provider I attest to compliance with applicable laws and regulations related to telemedicine.      Patient Participation / Response:  Fully participated with the group by sharing personal reflections / insights and openly received / provided feedback with other participants.    Demonstrated understanding of topics discussed through group discussion and participation, Demonstrated understanding of values, strengths, and challenges to learn about themselves, Identified / Expressed readiness to act intentionally, increase self-compassion, promote personal growth and Practiced skills in session    Treatment Plan:  Patient has a current master individualized treatment plan.  See Epic treatment plan for more information.    Cheikh Burnett, RUDDYFT

## 2021-08-10 NOTE — GROUP NOTE
Psychoeducation Group Note    PATIENT'S NAME: Ruth Johnston  MRN:   [id number]  :   1965  ACCT. NUMBER: [id number]  DATE OF SERVICE: 8/10/21  START TIME:  2:00 PM  END TIME:  2:50 PM  FACILITATOR: Sharonda Redd RN  TOPIC: LUDIN RN Group: Mental Health Maintenance  [facility] Adult Partial Hospitalization Program  TRACK: 1    NUMBER OF PARTICIPANTS: 4    Summary of Group / Topics Discussed:  Mental Health Maintenance:  Assessments of Strengths: Patients completed a self-reflection on personal strengths worksheet. The concept of personal strength as it relates to resilience were explored. Patients shared responses with the group and participated in discussion.     Patient Session Goals / Objectives:  ? Patients identified 1-3 qualities they consider a personal strength.  ? Patients understood the concept of personal strengths and the connection it has to resiliency                                    Service Modality:  Video Visit     Telemedicine Visit: The patient's condition can be safely assessed and treated via synchronous audio and visual telemedicine encounter.      Reason for Telemedicine Visit: Services only offered telehealth    Originating Site (Patient Location): Patient's home    Distant Site (Provider Location): Provider Remote Setting- Home Office    Consent:  The patient/guardian has verbally consented to: the potential risks and benefits of telemedicine (video visit) versus in person care; bill my insurance or make self-payment for services provided; and responsibility for payment of non-covered services.     Patient would like the video invitation sent by:  My Chart    Mode of Communication:  Video Conference via Medical Zoom    As the provider I attest to compliance with applicable laws and regulations related to telemedicine.           Patient Participation / Response:  Fully participated with the group by sharing personal reflections / insights and openly received / provided  feedback with other participants.    Verbalized understanding of mental health maintenance topic    Treatment Plan:  Patient has a current master individualized treatment plan.  See Epic treatment plan for more information.    Sharonda Redd RN

## 2021-08-10 NOTE — GROUP NOTE
Process Group Note    PATIENT'S NAME: Ruth Johnston  MRN:   [id number]  :   1965  ACCT. NUMBER: [id number]  DATE OF SERVICE: 8/10/21  START TIME:  9:00 AM  END TIME:  9:50 AM  FACILITATOR: Marie Sarmiento LMFT  TOPIC:  Process Group    Diagnoses:  Bipolar 2  PTSD    [facility] Adult Partial Hospitalization Program  TRACK: 1    NUMBER OF PARTICIPANTS: 5                                      Service Modality:  Video Visit     Telemedicine Visit: The patient's condition can be safely assessed and treated via synchronous audio and visual telemedicine encounter.      Reason for Telemedicine Visit: Services only offered telehealth    Originating Site (Patient Location): Patient's home    Distant Site (Provider Location): Provider Remote Setting- Home Office    Consent:  The patient/guardian has verbally consented to: the potential risks and benefits of telemedicine (video visit) versus in person care; bill my insurance or make self-payment for services provided; and responsibility for payment of non-covered services.     Patient would like the video invitation sent by:  My Chart    Mode of Communication:  Video Conference via Medical Zoom    As the provider I attest to compliance with applicable laws and regulations related to telemedicine.                Data:    Session content: At the start of this group, patients were invited to check in by identifying themselves, describing their current emotional status, and identifying issues to address in this group.   Area(s) of treatment focus addressed in this session included Symptom Management, Personal Safety and Community Resources/Discharge Planning.    Harvey reported feeling sad today and also is in a great spot to learn today. Patient identified the following goal(s) to work towards today: seeing a friend for dinner and put together an [employer] grill. Patient plans to use the following skills to achieve their goal: focusing on the rewards. Patient  anticipates the following barriers that may interfere with achieving their goal: frustration with the picture only instructions or bailing on dinner because of not wanting to get ready and get out. Denies safety concerns in regards to suicidal ideation, however reports safety concerned related to her ex-. The group supported Harvey through problem solving a safety plan in case her ex- comes around. Harvey reports chemical use by using edibles to sleep, and reports taking their medications as prescribed, with needing a refill. Patient reports feeling proud of/grateful for: made the bed and not making different choices in a new relationship. Patient asked for the following supports from the group today: feedback.    Therapeutic Interventions/Treatment Strategies:  Psychotherapist offered support, feedback and validation and reinforced use of skills. Treatment modalities used include Motivational Interviewing, Cognitive Behavioral Therapy and Dialectical Behavioral Therapy.    Assessment:    Patient response:   Patient responded to session by accepting feedback, giving feedback, listening, focusing on goals, being attentive and accepting support    Possible barriers to participation / learning include: and no barriers identified    Health Issues:   None reported       Substance Use Review:   Substance Use: No active concerns identified.    Mental Status/Behavioral Observations  Appearance:   Appropriate   Eye Contact:   Good   Psychomotor Behavior: Normal   Attitude:   Cooperative  Friendly Pleasant  Orientation:   All  Speech   Rate / Production: Normal    Volume:  Normal   Mood:    Normal  Affect:    Appropriate   Thought Content:   Clear  Thought Form:  Coherent  Logical     Insight:    Good  and Fair     Plan:     Safety Plan: Committed to safety and agreed to follow previously developed safety coping plan.     No current safety concerns identified.  Recommended that patient call 911 or go to the  local ED should there be a change in any of these risk factors.     Barriers to treatment: None identified    Patient Contracts (see media tab):  None    Substance Use: Not addressed in session     Continue or Discharge: Patient will continue in Adult Partial Hospitalization Program (PHP)  as planned. Patient is likely to benefit from learning and using skills as they work toward the goals identified in their treatment plan.      Marie Sarmiento, VANNESA  August 10, 2021

## 2021-08-10 NOTE — GROUP NOTE
Process Group Note    PATIENT'S NAME: Ruth Johnston  MRN:   [id number]  :   1965  ACCT. NUMBER: [id number]  DATE OF SERVICE: 21  START TIME: 10:00 AM  END TIME: 10:50 AM  FACILITATOR: Cheikh Burnett LMFT  TOPIC:  Process Group    Diagnoses:  Bipolar 2  PTSD      [facility] Adult Partial Hospitalization Program  TRACK: [facility]    NUMBER OF PARTICIPANTS: 4    Service Modality:  Video Visit     Telemedicine Visit: The patient's condition can be safely assessed and treated via synchronous audio and visual telemedicine encounter.      Reason for Telemedicine Visit: Services only offered telehealth and due to COVID-19.    Originating Site (Patient Location): Patient's home    Distant Site (Provider Location): Provider Remote Setting- Home Office    Consent:  The patient/guardian has verbally consented to: the potential risks and benefits of telemedicine (video visit) versus in person care; bill my insurance or make self-payment for services provided; and responsibility for payment of non-covered services.     Patient would like the video invitation sent by:  My Chart and email    Mode of Communication:  Video Conference via Medical Zoom    As the provider I attest to compliance with applicable laws and regulations related to telemedicine.          Data:    Session content: At the start of this group, patients were invited to check in by identifying themselves, describing their current emotional status, and identifying issues to address in this group.   Area(s) of treatment focus addressed in this session included Symptom Management, Personal Safety and Community Resources/Discharge Planning.  Patient reported feeling rested.  She slept in a sleep in a little this morning.  She is feeling hopeful, but some trepidation about paperwork she needs to complete.   Patient discussed working toward doing going through one drawer at a time with cleaning.   For skills they will use to address their  goal(s), patient identified using skills from body building and life- skills.   A barrier to working toward their goal(s) and/or addressing mental health symptoms the patient identified was none identified.  Patient reported no safety concerns and/or self-injurious behaviors. Patient reported she had 2 drinks at a wedding reception. Patient reported they are taking their medications as prescribed.   Patient reported feeling proud/grateful that they didn t get upset about a Sunday morning not working out as she wanted.  Patient discussed with the treatment group that they would like to hear a joke from the group because she felt she needed a good laugh.    Therapeutic Interventions/Treatment Strategies:  Psychotherapist offered support, feedback and validation and reinforced use of skills. Treatment modalities used include Motivational Interviewing and Cognitive Behavioral Therapy. Interventions include Coping Skills: Assisted patient in identifying 1-2 healthy distraction skills to reduce overall distress, Symptoms Management: Promoted understanding of their diagnoses and how it impacts their functioning and Emotions Management:  Discussed barriers to emotional regulation.    Assessment:    Patient response:   Patient responded to session by accepting feedback, giving feedback, listening, focusing on goals, being attentive and accepting support    Possible barriers to participation / learning include: and no barriers identified    Health Issues:   None reported       Substance Use Review:   Substance Use: Patient reported she had 2 drinks at a wedding reception.    Mental Status/Behavioral Observations  Appearance:   Appropriate   Eye Contact:   Good   Psychomotor Behavior: Normal   Attitude:   Cooperative   Orientation:   All  Speech   Rate / Production: Normal    Volume:  Normal   Mood:    Normal  Affect:    Appropriate   Thought Content:   Clear and Safety denies any current safety concerns including  suicidal ideation, self-harm, and homicidal ideation  Thought Form:  Coherent  Logical     Insight:    Good     Plan:     Safety Plan: No current safety concerns identified.  Recommended that patient call 911 or go to the local ED should there be a change in any of these risk factors.     Barriers to treatment: None identified    Patient Contracts (see media tab):  None    Substance Use: Provided encouragement towards sobriety     Continue or Discharge: Patient will continue in Adult Partial Hospitalization Program (PHP)  as planned. Patient is likely to benefit from learning and using skills as they work toward the goals identified in their treatment plan.      Cheikh Burnett, VANNESA  August 10, 2021

## 2021-08-11 ENCOUNTER — HOSPITAL ENCOUNTER (OUTPATIENT)
Dept: BEHAVIORAL HEALTH | Facility: CLINIC | Age: 56
End: 2021-08-11
Attending: FAMILY MEDICINE
Payer: COMMERCIAL

## 2021-08-11 ENCOUNTER — HOSPITAL ENCOUNTER (OUTPATIENT)
Dept: BEHAVIORAL HEALTH | Facility: CLINIC | Age: 56
End: 2021-08-11
Attending: PSYCHIATRY & NEUROLOGY
Payer: COMMERCIAL

## 2021-08-11 PROCEDURE — 99213 OFFICE O/P EST LOW 20 MIN: CPT | Mod: 95 | Performed by: NURSE PRACTITIONER

## 2021-08-11 PROCEDURE — H0035 MH PARTIAL HOSP TX UNDER 24H: HCPCS | Mod: GT | Performed by: COUNSELOR

## 2021-08-11 PROCEDURE — H0035 MH PARTIAL HOSP TX UNDER 24H: HCPCS | Mod: GT

## 2021-08-11 PROCEDURE — 99207 PR CDG-CODE CATEGORY CHANGED: CPT | Performed by: NURSE PRACTITIONER

## 2021-08-11 PROCEDURE — H0035 MH PARTIAL HOSP TX UNDER 24H: HCPCS | Mod: GT,95

## 2021-08-11 NOTE — GROUP NOTE
Psychoeducation Group Note    PATIENT'S NAME: Ruth Johnston  MRN:   [id number]  :   1965  ACCT. NUMBER: [id number]  DATE OF SERVICE: 21  START TIME:  1:00 PM  END TIME:  1:50 PM  FACILITATOR: Sharonda Redd RN  TOPIC: LUDIN RN Group: [facility] Adult Partial Hospitalization Program  TRACK: 1    NUMBER OF PARTICIPANTS: 4    Summary of Group / Topics Discussed:  Foundations of Health: Nutrition: Super Nutrients & Micronutrients: Super Nutrients are Foods that have high nutritional yield. Micronutrients are essential elements found in food or taken through supplements that are necessary for normal physiological functioning. This group was designed to complement the macronutrients group and build upon previous education. The changes that food makes on the brain (how the brain uses sugar) and nutrition as it relates to mental health was also discussed.       Patient Session Goals / Objectives:  ? Identified the health enhancing benefits to good nutrition  ? Verbalized ways in which the food we eat affects the brain  ? Explained the role of micronutrients in the body, how much we need, and how to get it                                    Service Modality:  Video Visit     Telemedicine Visit: The patient's condition can be safely assessed and treated via synchronous audio and visual telemedicine encounter.      Reason for Telemedicine Visit: Services only offered telehealth    Originating Site (Patient Location): Patient's home    Distant Site (Provider Location): Provider Remote Setting- Home Office    Consent:  The patient/guardian has verbally consented to: the potential risks and benefits of telemedicine (video visit) versus in person care; bill my insurance or make self-payment for services provided; and responsibility for payment of non-covered services.     Patient would like the video invitation sent by:  My Chart    Mode of Communication:  Video Conference via Medical  Zoom    As the provider I attest to compliance with applicable laws and regulations related to telemedicine.           Patient Participation / Response:  Fully participated with the group by sharing personal reflections / insights and openly received / provided feedback with other participants.    Identified / Expressed personal readiness to practice skills    Treatment Plan:  Patient has a current master individualized treatment plan.  See Epic treatment plan for more information.    Sharonda Redd RN

## 2021-08-11 NOTE — PROGRESS NOTES
Acknowledgement of Current Treatment Plan       I have reviewed my treatment plan with my therapist / counselor on 8/11/21.   I agree with the plan as it is written in the electronic health record.    Name:      Signature:  Ruth Johnston (Harvey) Unable to sign due to Covid-19, reviewed verbally   Dr. Lang Leger MD  Psychiatrist    Marie Sarmiento MA, [facility] VANNESA James on 8/11/2021 at 3:15 PM

## 2021-08-11 NOTE — GROUP NOTE
Psychoeducation Group Note    PATIENT'S NAME: Ruth Johnston  MRN:   [id number]  :   1965  ACCT. NUMBER: [id number]  DATE OF SERVICE: 21  START TIME: 10:00 AM  END TIME: 10:50 AM  FACILITATOR: Sharonda Redd RN  TOPIC: LUDIN RN Group: [facility] Hospitalization Program  TRACK: 1    NUMBER OF PARTICIPANTS: 5    Summary of Group / Topics Discussed:  Foundations of Health: Nutrition: Super Nutrients & Micronutrients: Super Nutrients are Foods that have high nutritional yield. Micronutrients are essential elements found in food or taken through supplements that are necessary for normal physiological functioning. This group was designed to complement the macronutrients group and build upon previous education. The changes that food makes on the brain (how the brain uses sugar) and nutrition as it relates to mental health was also discussed.       Patient Session Goals / Objectives:  ? Identified the health enhancing benefits to good nutrition  ? Verbalized ways in which the food we eat affects the brain  ? Explained the role of micronutrients in the body, how much we need, and how to get it                                    Service Modality:  Video Visit     Telemedicine Visit: The patient's condition can be safely assessed and treated via synchronous audio and visual telemedicine encounter.      Reason for Telemedicine Visit: Services only offered telehealth    Originating Site (Patient Location): Patient's home    Distant Site (Provider Location): Provider Remote Setting- Home Office    Consent:  The patient/guardian has verbally consented to: the potential risks and benefits of telemedicine (video visit) versus in person care; bill my insurance or make self-payment for services provided; and responsibility for payment of non-covered services.     Patient would like the video invitation sent by:  My Chart    Mode of Communication:  Video Conference via Medical  Zoom    As the provider I attest to compliance with applicable laws and regulations related to telemedicine.           Patient Participation / Response:  Moderately participated, sharing some personal reflections / insights and adequately adequately received / provided feedback with other participants.    Verbalized understanding of foundations of health topic    Treatment Plan:  Patient has a current master individualized treatment plan.  See Epic treatment plan for more information.    Sharonda Redd RN
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
## 2021-08-11 NOTE — GROUP NOTE
Psychotherapy Group Note    PATIENT'S NAME: Ruth Johnston  MRN:   [id number]  :   1965  ACCT. NUMBER: [id number]  DATE OF SERVICE: 21  START TIME: 11:00 AM  END TIME: 11:50 AM  FACILITATOR: Marie Sarmiento LMFT  TOPIC:  EBP Group: Emotions Management  [facility] Adult Partial Hospitalization Program  TRACK: 1    NUMBER OF PARTICIPANTS: 5    Summary of Group / Topics Discussed:  Emotions Management: Model of Emotions: Patients were introduced to the cyclical model of emotions.  Explored emotions are shaped by different events and one s interpretation of events.  Group discussed how emotions begin with an event, followed by one s interpretation, followed by associated feelings.  Discussion included a review of personal urges and actions that can/do follow an emotional experience in the patient s life, and the end results.    Patient Session Goals / Objectives:    Demonstrate understanding of types various emotions.    Identify and discuss specific emotions and when they occur; understand triggers.    Identify individual emotions and physical sensations that accompany them.    Discuss urges and actions, and how to influence the intensity of emotional reactions and disrupt the cycle.      Discuss barriers to emotional regulation.    Choose 1-2 strategies to assist with emotional response to potentially distressing situations.                                      Service Modality:  Video Visit     Telemedicine Visit: The patient's condition can be safely assessed and treated via synchronous audio and visual telemedicine encounter.      Reason for Telemedicine Visit: Services only offered telehealth    Originating Site (Patient Location): Patient's home    Distant Site (Provider Location): Provider Remote Setting- Home Office    Consent:  The patient/guardian has verbally consented to: the potential risks and benefits of telemedicine (video visit) versus in person care; bill my insurance or  make self-payment for services provided; and responsibility for payment of non-covered services.     Patient would like the video invitation sent by:  My Chart    Mode of Communication:  Video Conference via Medical Zoom    As the provider I attest to compliance with applicable laws and regulations related to telemedicine.              Patient Participation / Response:  Fully participated with the group by sharing personal reflections / insights and openly received / provided feedback with other participants.    Demonstrated understanding of topics discussed through group discussion and participation, Expressed understanding of the relevance / importance of emotions management skills at distressing times in life and Practiced 2-3 new skills in session    Treatment Plan:  Patient has a current master individualized treatment plan.  See Epic treatment plan for more information.    Marie Sarmiento LMFT

## 2021-08-11 NOTE — GROUP NOTE
Psychotherapy Group Note    PATIENT'S NAME: Ruth Johnston  MRN:   [id number]  :   1965  ACCT. NUMBER: [id number]  DATE OF SERVICE: 21  START TIME:  2:00 PM  END TIME:  2:50 PM  FACILITATOR: Marie Sarmiento LMFT  TOPIC: MH EBP Group: Relationship Skills  [facility] Adult Partial Hospitalization Program  TRACK: 1    NUMBER OF PARTICIPANTS: 4    Summary of Group / Topics Discussed:  Relationship Skills: Boundaries: Patients were provided with a general overview of interpersonal boundaries and how lack of boundaries relates to symptoms and functioning. The purpose is to help patients identify boundary issues and gain awareness and skills to work towards healthier interpersonal boundaries. Current awareness of healthy boundary characteristics and barriers to establishing healthy boundaries were discussed.    Patient Session Goals / Objectives:    Familiarized patients with the concept of interpersonal boundaries and their characteristics    Discussed and practiced strategies to promote healthier interpersonal boundaries    Identified boundary issues and identified plan to improve boundaries                                        Service Modality:  Video Visit     Telemedicine Visit: The patient's condition can be safely assessed and treated via synchronous audio and visual telemedicine encounter.      Reason for Telemedicine Visit: Services only offered telehealth    Originating Site (Patient Location): Patient's home    Distant Site (Provider Location): Provider Remote Setting- Home Office    Consent:  The patient/guardian has verbally consented to: the potential risks and benefits of telemedicine (video visit) versus in person care; bill my insurance or make self-payment for services provided; and responsibility for payment of non-covered services.     Patient would like the video invitation sent by:  My Chart    Mode of Communication:  Video Conference via Medical Zoom    As the provider I  attest to compliance with applicable laws and regulations related to telemedicine.            Patient Participation / Response:  Fully participated with the group by sharing personal reflections / insights and openly received / provided feedback with other participants.    Demonstrated understanding of topics discussed through group discussion and participation and Demonstrated understanding of relationship skills and communication skills    Treatment Plan:  Patient has a current master individualized treatment plan and today was our weekly review of the patient's progress.  See Epic treatment plan for progress / updates on goals and plan.    Marie Sarmiento LMFT

## 2021-08-12 ENCOUNTER — HOSPITAL ENCOUNTER (OUTPATIENT)
Dept: BEHAVIORAL HEALTH | Facility: CLINIC | Age: 56
End: 2021-08-12
Attending: FAMILY MEDICINE
Payer: COMMERCIAL

## 2021-08-12 PROCEDURE — H0035 MH PARTIAL HOSP TX UNDER 24H: HCPCS | Mod: GT

## 2021-08-12 PROCEDURE — H0035 MH PARTIAL HOSP TX UNDER 24H: HCPCS | Mod: GT | Performed by: SOCIAL WORKER

## 2021-08-12 PROCEDURE — H0035 MH PARTIAL HOSP TX UNDER 24H: HCPCS | Mod: GT,95 | Performed by: COUNSELOR

## 2021-08-12 NOTE — GROUP NOTE
Psychotherapy Group Note    PATIENT'S NAME: Ruth Johnston  MRN:   [id number]  :   1965  ACCT. NUMBER: [id number]  DATE OF SERVICE: 21  START TIME: 11:00 AM  END TIME: 11:50 AM  FACILITATOR: Faby Alvarenga LICSW  TOPIC:  EBP Group: Enhanced Mindfulness  [facility] Adult Partial Hospitalization Program  TRACK: 1    NUMBER OF PARTICIPANTS: 7    Summary of Group / Topics Discussed:  Enhanced Mindfulness: Body and Mind Integration: Patients received an overview and education regarding the importance of including the body in the management of emotional health and self-care and as a direct route to mindfulness practice.  Patients discussed various ways in which the body can serve as an informant to their physical and emotional experiences/need. Patients discussed the body as a direct link to the present moment and to mindfulness practice.  Patients discussed current relationship with body, self-awareness, mindfulness practice and barriers to connection with body.  Patients were guided through breath work and movement to facilitate greater self-awareness, grounding, self-expression, and connection to other.  Patients discussed how the experiential could be applied to better manage mental health and develop cortez connection to self.    Patient Session Goals / Objectives:    Identify how movement awareness could be used for grounding, stress management, self-expression, connection to other and self-regulation    Improved awareness of breath and movement preferences    Identify how movement and the body is used in mindfulness practice    Reflect on use of these practices in everyday life.    Identify barriers to attending to body                                        Service Modality:  Video Visit         Telemedicine Visit: The patient's condition can be safely assessed and treated via synchronous audio and visual telemedicine encounter.          Reason for Telemedicine Visit: Services only  offered telehealth and covid19        Originating Site (Patient Location): Patient's home        Distant Site (Provider Location): Provider Remote Setting- Home Office        Consent:  The patient/guardian has verbally consented to: the potential risks and benefits of telemedicine (video visit) versus in person care; bill my insurance or make self-payment for services provided; and responsibility for payment of non-covered services.         Patient would like the video invitation sent by:  My Chart        Mode of Communication:  Video Conference via Medical Zoom        As the provider I attest to compliance with applicable laws and regulations related to telemedicine.               Patient Participation / Response:  Fully participated with the group by sharing personal reflections / insights and openly received / provided feedback with other participants.    Demonstrated understanding of topics discussed through group discussion and participation and Practiced skills in session    Treatment Plan:  Patient has See Epic Treatment Plan - Patient is discharging.    ALEX MedinaSW

## 2021-08-12 NOTE — GROUP NOTE
Psychoeducation Group Note    PATIENT'S NAME: Ruth Johnston  MRN:   [id number]  :   1965  ACCT. NUMBER: [id number]  DATE OF SERVICE: 21  START TIME:  1:00 PM  END TIME:  1:50 PM  FACILITATOR: Sharonda Redd RN  TOPIC: MH RN Group: Brain [facility] [place] [place] Adult Partial Hospitalization Program  TRACK: 1    NUMBER OF PARTICIPANTS: 7    Summary of Group / Topics Discussed:  Brain Health:  Pathophysiology of Mood Disorders: Patients were educated on mood disorder etiology and neuroscience, risk factors, symptoms, and pharmacologic, psychotherapeutic, and complementary treatment options. Patients were guided on a discussion of mental, behavioral, and physical symptoms and shared their symptoms with the group.     Patient Session Goals / Objectives:  ? Described what mood disorders are and identified risk factors   ? Explained how chemical imbalances in the brain can cause symptoms and how medications work to reverse this imbalance   ? Identified and described pharmacologic, psychotherapeutic, and complementary treatment options                                    Service Modality:  Video Visit     Telemedicine Visit: The patient's condition can be safely assessed and treated via synchronous audio and visual telemedicine encounter.      Reason for Telemedicine Visit: Services only offered telehealth    Originating Site (Patient Location): Patient's home    Distant Site (Provider Location): Provider Remote Setting- Home Office    Consent:  The patient/guardian has verbally consented to: the potential risks and benefits of telemedicine (video visit) versus in person care; bill my insurance or make self-payment for services provided; and responsibility for payment of non-covered services.     Patient would like the video invitation sent by:  My Chart    Mode of Communication:  Video Conference via Medical Zoom    As the provider I attest to compliance with applicable laws and regulations related to  telemedicine.           Patient Participation / Response:  Fully participated with the group by sharing personal reflections / insights and openly received / provided feedback with other participants.    Verbalized understanding of brain health topic    Treatment Plan:  Patient has a current master individualized treatment plan.  See Epic treatment plan for more information.    Sharonda Redd RN
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
## 2021-08-12 NOTE — DISCHARGE SUMMARY
Adult Mental Health Intensive Outpatient Discharge Summary/Instructions      Patient: Ruth Johnston MRN: [id number]   : 1965 Age: 55 year old Sex: adult     Admission Date: 21  Discharge Date: 21  Diagnosis: Bipolar 2  PTSD    Focus of Treatment / Progress    Personal Safety:      * Follow your safety plan     * Call crisis lines as needed:    [facility] [phone] [facility] [phone]  [facility] [phone] Crisis Connection [phone]  [facility] [phone] [facility] COPE [phone]  [facility] [phone] National Suicide Prevention [phone]  [facility] [phone] Suicide Prevention [phone]  [facility] [phone]    Managing symptoms of:  Anxiety, depression, grief/loss, trauma    Community support/health:  [email or web address], [email or web address]     Managing Symptoms and Preventing Relapse    * Go to all of your appointments    * Take all medications as directed.      * Carry a current list if medication with you    * Do not use illicit (street) drugs.  Avoid alcohol    * Report these symptoms to your care team. These are early signs of relapse:   Thoughts of suicide   Losing more sleep   Increased confusion   Mood getting worse   Feeling more aggressive   Other:  isolation    *Use these skills daily:  Mindfulness, grounding, movement, radical acceptance, dialectical shifts    Copy of summary sent to: patient via the grafter    Follow up with psychiatrist / main caregiver: Dr. Swain [facility]    Next visit: N/A    Follow up with your therapist: [facility]   Next visit: 21    Go to group therapy and / or support groups at: [facility] Int[facility]/Adult Day Treatment 7B track on 21. Group meets Tuesday, Wednesday, Friday from 1:00p-4:00p.     See your medical doctor about:  Annual physical and any medical concerns    Other:  Harvey, your treatment team has enjoyed the opportunity to  work with you on your healing journey, and wishes you the very best going forward!     Client Signature: unable to sign due to Covid-19, reviewed verbally Date / Time:___________  Staff Signature:VANNESA James on 8/13/2021 at 1:55 PM

## 2021-08-12 NOTE — GROUP NOTE
Psychotherapy Group Note    PATIENT'S NAME: Ruth Johnston  MRN:   [id number]  :   1965  ACCT. NUMBER: [id number]  DATE OF SERVICE: 21  START TIME: 10:00 AM  END TIME: 10:50 AM  FACILITATOR: Faby Alvarenga LICSW  TOPIC:  EBP Group: Self-Awareness  [facility] Adult Partial Hospitalization Program  TRACK: 1    NUMBER OF PARTICIPANTS: 7    Summary of Group / Topics Discussed:  Self-Awareness: Grief: Patients were provided with an overview of how personal losses impact their thoughts, feelings, and behaviors. Different stages of grief were discussed, with a focus on the personal and individual experiences of grief as a natural response to loss. The relationship between grief, depression, and anxiety was also discussed. Patients were provided with information regarding different ways of processing grief and shared their personal experiences.     Patient Session Goals / Objectives:    Defined and explored the concept of grief and the grieving process    Discussed relationship between grief, depression, and anxiety     Normalized and recognized the purpose/benefits of the grieving process    Discussed management of the thoughts and feelings associated with grief                                        Service Modality:  Video Visit         Telemedicine Visit: The patient's condition can be safely assessed and treated via synchronous audio and visual telemedicine encounter.          Reason for Telemedicine Visit: Services only offered telehealth and covid19        Originating Site (Patient Location): Patient's home        Distant Site (Provider Location): Provider Remote Setting- Home Office        Consent:  The patient/guardian has verbally consented to: the potential risks and benefits of telemedicine (video visit) versus in person care; bill my insurance or make self-payment for services provided; and responsibility for payment of non-covered services.         Patient would like the video  invitation sent by:  My Chart        Mode of Communication:  Video Conference via Medical Zoom        As the provider I attest to compliance with applicable laws and regulations related to telemedicine.             Patient Participation / Response:  Fully participated with the group by sharing personal reflections / insights and openly received / provided feedback with other participants.    Demonstrated understanding of topics discussed through group discussion and participation and Demonstrated understanding of values, strengths, and challenges to learn about themselves    Treatment Plan:  Patient has See Epic Treatment Plan - Patient is discharging.    Faby Alvarenga, [facility]

## 2021-08-12 NOTE — GROUP NOTE
Psychoeducation Group Note    PATIENT'S NAME: Ruth Johnston  MRN:   [id number]  :   1965  ACCT. NUMBER: [id number]  DATE OF SERVICE: 21  START TIME:  2:00 PM  END TIME:  2:50 PM  FACILITATOR: Marie Sarmiento LMFT  TOPIC: MH PHP OT Group: Lifestyle Balance and Structure  [facility] Adult Partial Hospitalization Program  TRACK: 1    NUMBER OF PARTICIPANTS: 7    Summary of Group / Topics Discussed:  Lifestyle Balance and Structure:  Leisure Values: Provided psychoeducation and discussion on benefits of leisure on stress management, parasympathetic NS activation, and wellness. Facilitated a structured self-reflective process where patients identified their leisure values: what is most important to them with regards to how they spend their time and energy on that promotes lifestyle balance to support mental wellbeing. Experiential process facilitated where patients reflected on past, present, and potential future leisure activities that fulfill their leisure values. Validation and support provided.    Patient Session Goals / Objectives:    [place] the mechanisms and benefits of leisure activity to create lifestyle balance and improve mental health.     Identified their leisure values (how they want to spend their time and energy)    Identified past, present, and future leisure activities that demonstrate a connection to their leisure values    Identify first step to engaging in a new or old leisure activity again and problem solve barriers.                                       Service Modality:  Video Visit     Telemedicine Visit: The patient's condition can be safely assessed and treated via synchronous audio and visual telemedicine encounter.      Reason for Telemedicine Visit: Services only offered telehealth    Originating Site (Patient Location): Patient's home    Distant Site (Provider Location): Provider Remote Setting- Home Office    Consent:  The patient/guardian has verbally  consented to: the potential risks and benefits of telemedicine (video visit) versus in person care; bill my insurance or make self-payment for services provided; and responsibility for payment of non-covered services.     Patient would like the video invitation sent by:  My Chart    Mode of Communication:  Video Conference via Medical Zoom    As the provider I attest to compliance with applicable laws and regulations related to telemedicine.            Patient Participation / Response:  Fully participated with the group by sharing personal reflections / insights and openly received / provided feedback with other participants.    Verbalized understanding of content and Patient would benefit from additional opportunities to practice the content to be able to generalize it to their everyday life with increased intentionality, consistency, and efficacy in support of their psychiatric recovery    Treatment Plan:  Patient has a current master individualized treatment plan.  See Epic treatment plan for more information.    Marie Sarmiento LMFT

## 2021-08-12 NOTE — PROGRESS NOTES
LOCUS Worksheet     Name: Ruth Johnston MRN: [id number]    : 1965      Gender:  female    PMI:  [id number]   Provider Name: FAROOQ James   Provider NPI:  [id number]    Actual level of Care Provided:  Adult Partial Hospitalization    Service(s) receiving or referred to:  Adult Day Treatment    Reason for Variance: N/A      Rating completed by: FAROOQ James      I. Risk of Harm:   3      Moderate Risk of Harm    II. Functional Status:   3      Moderate Impairment    III. Co-Morbidity:   2      Minor Co-Morbidity    IV - A. Recovery Environment - Level of Stress:   3      Moderately Stress Environment    IV - B. Recovery Environment - Level of Support:   2      Supportive Environment    V. Treatment and Recovery History:   2      Significant Response to Treatment and Recovery Management    VI. Engagement and Recovery Project:   2      Positive Engagement and Recovery       17 Composite Score    Level of Care Recommendation:   17 to 19       High Intensity Community Based Services

## 2021-08-13 ENCOUNTER — HOSPITAL ENCOUNTER (OUTPATIENT)
Dept: BEHAVIORAL HEALTH | Facility: CLINIC | Age: 56
End: 2021-08-13
Attending: FAMILY MEDICINE
Payer: COMMERCIAL

## 2021-08-13 PROCEDURE — H0035 MH PARTIAL HOSP TX UNDER 24H: HCPCS | Mod: GT,95

## 2021-08-13 PROCEDURE — H0035 MH PARTIAL HOSP TX UNDER 24H: HCPCS | Mod: GT,95 | Performed by: COUNSELOR

## 2021-08-13 NOTE — GROUP NOTE
Process Group Note    PATIENT'S NAME: Ruth Johnston  MRN:   [id number]  :   1965  ACCT. NUMBER: [id number]  DATE OF SERVICE: 21  START TIME:  9:00 AM  END TIME:  9:50 AM  FACILITATOR: Marie Sarmiento LMFT  TOPIC:  Process Group    Diagnoses:  Bipolar 2  PTSD    [facility] Adult Partial Hospitalization Program  TRACK: 1    NUMBER OF PARTICIPANTS: 6                                      Service Modality:  Video Visit     Telemedicine Visit: The patient's condition can be safely assessed and treated via synchronous audio and visual telemedicine encounter.      Reason for Telemedicine Visit: Services only offered telehealth    Originating Site (Patient Location): Patient's home    Distant Site (Provider Location): Provider Remote Setting- Home Office    Consent:  The patient/guardian has verbally consented to: the potential risks and benefits of telemedicine (video visit) versus in person care; bill my insurance or make self-payment for services provided; and responsibility for payment of non-covered services.     Patient would like the video invitation sent by:  My Chart    Mode of Communication:  Video Conference via Medical Zoom    As the provider I attest to compliance with applicable laws and regulations related to telemedicine.                Data:    Session content: At the start of this group, patients were invited to check in by identifying themselves, describing their current emotional status, and identifying issues to address in this group.   Area(s) of treatment focus addressed in this session included Symptom Management, Personal Safety and Community Resources/Discharge Planning.    Harvey reported feeling very hopeful and very good today. Patient identified the following goal(s) to work towards today: take care of self over the weekend during rehearsals. Patient plans to use the following skills to achieve their goal: self-awareness, compassion, boundaries. Patient anticipates  the following barriers that may interfere with achieving their goal: none. Denies safety concerns related to self-harm or suicidal ideation, reports her former  is leaving town today which Harvey anticipates increasing feelings of safety. Harvey problem solved ways to stay safe. Denies chemical use, and reports taking their medications as prescribed. Patient reports feeling proud of/grateful for: going to see a concert and deciding which feelings of grief to let through. Patient asked for the following supports from the group today: feedback and having a nice goodbye today for the last day.    Therapeutic Interventions/Treatment Strategies:  Psychotherapist offered support, feedback and validation and reinforced use of skills. Treatment modalities used include Motivational Interviewing, Cognitive Behavioral Therapy and Dialectical Behavioral Therapy.    Assessment:    Patient response:   Patient responded to session by accepting feedback, giving feedback, listening, focusing on goals, being attentive and accepting support    Possible barriers to participation / learning include: and no barriers identified    Health Issues:   None reported       Substance Use Review:   Substance Use: No active concerns identified.    Mental Status/Behavioral Observations  Appearance:   Appropriate   Eye Contact:   Good   Psychomotor Behavior: Normal   Attitude:   Cooperative  Pleasant  Orientation:   All  Speech   Rate / Production: Normal    Volume:  Normal   Mood:    Normal  Affect:    Appropriate   Thought Content:   Clear  Thought Form:  Coherent  Logical     Insight:    Good  and Fair     Plan:     Safety Plan: Committed to safety and agreed to follow previously developed safety coping plan.     No current safety concerns identified.  Recommended that patient call 911 or go to the local ED should there be a change in any of these risk factors.     Barriers to treatment: None identified    Patient Contracts (see media  tab):  None    Substance Use: Not addressed in session     Continue or Discharge: Patient is being discharged today. See Treatment Plan and Discharge Summary.       VANNESA James  August 13, 2021

## 2021-08-13 NOTE — GROUP NOTE
Psychotherapy Group Note    PATIENT'S NAME: Ruth Johnston  MRN:   [id number]  :   1965  ACCT. NUMBER: [id number]  DATE OF SERVICE: 21  START TIME:  1:00 PM  END TIME:  1:50 PM  FACILITATOR: Marie Sarmiento LMFT  TOPIC: MH EBP Group: Relationship Skills  [facility] Adult Partial Hospitalization Program  TRACK: 1    NUMBER OF PARTICIPANTS: 7    Summary of Group / Topics Discussed:  Relationship Skills: Basic Communication: Patients were provided with a general overview of interpersonal communication skills and information about how communication skills impact symptoms and functioning. The goal of this topic is to help patients identify communication issues and gain skills to work towards healthier interpersonal communication. Patients reviewed their current awareness of communication issues and how communication skills impact relationships and functioning.      Patient Session Goals / Objectives:    Identified and discussed patients individual challenges with basic communication    Presented and practiced effective communication skills in session    Assisted patients in implementing more effective communication skills in their relationships                                      Service Modality:  Video Visit     Telemedicine Visit: The patient's condition can be safely assessed and treated via synchronous audio and visual telemedicine encounter.      Reason for Telemedicine Visit: Services only offered telehealth    Originating Site (Patient Location): Patient's home    Distant Site (Provider Location): Provider Remote Setting- Home Office    Consent:  The patient/guardian has verbally consented to: the potential risks and benefits of telemedicine (video visit) versus in person care; bill my insurance or make self-payment for services provided; and responsibility for payment of non-covered services.     Patient would like the video invitation sent by:  My Chart    Mode of Communication:   Video Conference via Medical Zoom    As the provider I attest to compliance with applicable laws and regulations related to telemedicine.            Patient Participation / Response:  Fully participated with the group by sharing personal reflections / insights and openly received / provided feedback with other participants.    Demonstrated understanding of topics discussed through group discussion and participation, Demonstrated understanding of relationship skills and communication skills, Identified / Expressed personal readiness to incorporate effective communication skills, Verbalized understanding of communication skills, communication challenges, and communication strengths and Practiced skills in session    Treatment Plan:  Patient has See Epic Treatment Plan - Patient is discharging.    VANNESA James

## 2021-08-13 NOTE — GROUP NOTE
Process Group Note    PATIENT'S NAME: Ruth Johnston  MRN:   [id number]  :   1965  ACCT. NUMBER: [id number]  DATE OF SERVICE: 21  START TIME:  9:00 AM  END TIME:  9:50 AM  FACILITATOR: Cheikh Burnett LMFT  TOPIC:  Process Group    Diagnoses:  Bipolar 2  PTSD      [facility] Adult Partial Hospitalization Program  TRACK: [place]    NUMBER OF PARTICIPANTS: 7    Service Modality:  Video Visit     Telemedicine Visit: The patient's condition can be safely assessed and treated via synchronous audio and visual telemedicine encounter.      Reason for Telemedicine Visit: Services only offered telehealth and due to COVID-19.    Originating Site (Patient Location): Patient's home    Distant Site (Provider Location): Provider Remote Setting- Home Office    Consent:  The patient/guardian has verbally consented to: the potential risks and benefits of telemedicine (video visit) versus in person care; bill my insurance or make self-payment for services provided; and responsibility for payment of non-covered services.     Patient would like the video invitation sent by:  My Chart and email    Mode of Communication:  Video Conference via Medical Zoom    As the provider I attest to compliance with applicable laws and regulations related to telemedicine.      Data:    Session content: At the start of this group, patients were invited to check in by identifying themselves, describing their current emotional status, and identifying issues to address in this group.   Area(s) of treatment focus addressed in this session included Symptom Management, Personal Safety and Community Resources/Discharge Planning.  Patient reported feeling very happy and proud of herself.  She is feeling hopeful.   Patient discussed working toward cleaning out one drawer or cupboard a day and keeping a commitment to taking care of their energy.   For skills they will use to address their goal(s), patient identified self-care and setting  boundaries in a polite, firm way.   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  Patient reported no safety concerns and/or self-injurious behaviors, but has some worries about her ex- doing something harmful toward her. Patient reported no substance use. Patient reported they are taking their medications as prescribed.   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   Patient discussed with the treatment group that they are working toward cleaning out one drawer or cupboard a day and keeping a commitment to taking care of their energy.    Therapeutic Interventions/Treatment Strategies:  Psychotherapist offered support, feedback and validation and reinforced use of skills. Treatment modalities used include Motivational Interviewing and Cognitive Behavioral Therapy. Interventions include Coping Skills: Assisted patient in identifying 1-2 healthy distraction skills to reduce overall distress, Symptoms Management: Promoted understanding of their diagnoses and how it impacts their functioning and Emotions Management:  Discussed barriers to emotional regulation.    Assessment:    Patient response:   Patient responded to session by accepting feedback, giving feedback, listening, focusing on goals, being attentive and accepting support    Possible barriers to participation / learning include: and no barriers identified    Health Issues:   None reported       Substance Use Review:   Substance Use: No active concerns identified.    Mental Status/Behavioral Observations  Appearance:   Appropriate   Eye Contact:   Good   Psychomotor Behavior: Normal   Attitude:   Cooperative   Orientation:   All  Speech   Rate / Production: Normal    Volume:  Normal   Mood:    Normal Anxious  Affect:    Appropriate    Thought Content:   Clear and Safety denies any current safety concerns including suicidal ideation, self-harm, and homicidal ideation  Thought Form:  Coherent  Logical     Insight:    Good     Plan:     Safety Plan: No current safety concerns identified.  Recommended that patient call 911 or go to the local ED should there be a change in any of these risk factors.     Barriers to treatment: None identified    Patient Contracts (see media tab):  None    Substance Use: Provided encouragement towards sobriety     Continue or Discharge: Patient will continue in Adult Partial Hospitalization Program (PHP)  as planned. Patient is likely to benefit from learning and using skills as they work toward the goals identified in their treatment plan.      Cheikh Burnett, RUDDYFT  August 12, 2021

## 2021-08-13 NOTE — TELEPHONE ENCOUNTER
----- Message from VANNESA Erazo sent at 8/13/2021  9:39 AM CDT -----  Regarding: Discharge from [facility] to ADT 7B  Patient Name: Harvey Johnston  Location of programming: ADT IOP 7B  Start Date:  August/17/ 2021   Group: BH7B on Tuesday, Wednesday and Friday from 1:00pm to 4:00pm   Attending Provider (MD): Dr. Leger   Number of visits to be scheduled: 36   Duration of Appointment in minutes: 180   Visit Type: Zoom     Additional notes: Patient is discharging from [facility] effective 8/13/21, please remove from [facility] moving forward.

## 2021-08-13 NOTE — TELEPHONE ENCOUNTER
----- Message from VANNESA James sent at 2021 11:47 AM CDT -----  RegardinB Admit  Scheduling Request    Patient Name: Ruth Johnston (LUTHER)  Location of programming: virtual  Start Date:   Group: 7B on Tuesday, Wednesday, and Friday at 1:00 PM to 4:00 PM  Attending Provider (MD): Dr Leger  Number of visits to be scheduled: 180  Duration of Appointment in minutes: 36  Visit Type: Zoom - [id number]    Additional notes: May request printed handouts to assist in learning due to TBI (we mailed, but also willing to  in person)

## 2021-08-16 NOTE — GROUP NOTE
Psychoeducation Group Note    PATIENT'S NAME: Ruth Johnston  MRN:   [id number]  :   1965  ACCT. NUMBER: [id number]  DATE OF SERVICE: 21  START TIME: 11:00 AM  END TIME: 11:50 AM  FACILITATOR: Cheikh Burnett LMFT  TOPIC: LUDIN RN Group: Health Maintenance  [facility] Adult Partial Hospitalization Program  TRACK: [facility]    NUMBER OF PARTICIPANTS: 7    Summary of Group / Topics Discussed:  Health Maintenance: Weekend planning: Patients were given time to complete a weekend plan of what they will do to promote wellness and sobriety over the weekend when they do not have the structure of group. Patients were encouraged to review progress on their treatment goals and were challenged to identify ways to work toward meeting them. Patients identified and discussed foreseeable barriers to success over the weekend and then developed a plan to overcome them. Patients reviewed their distress coping skills and social support network and discussed this with the group.       Patient Session Goals / Objectives:    ?    Identified activities to engage in that promote balance in wellness  ?    Distinguished possible barriers to success over the weekend and created a plan to overcome them  ?    Listed distress coping skills and identified social support network to utilize if in crisis during the weekend    Service Modality:  Video Visit     Telemedicine Visit: The patient's condition can be safely assessed and treated via synchronous audio and visual telemedicine encounter.      Reason for Telemedicine Visit: Services only offered telehealth and due to COVID-19.    Originating Site (Patient Location): Patient's home    Distant Site (Provider Location): Provider Remote Setting- Home Office    Consent:  The patient/guardian has verbally consented to: the potential risks and benefits of telemedicine (video visit) versus in person care; bill my insurance or make self-payment for services provided; and responsibility  for payment of non-covered services.     Patient would like the video invitation sent by:  My Chart and email    Mode of Communication:  Video Conference via Medical Zoom    As the provider I attest to compliance with applicable laws and regulations related to telemedicine.          Patient Participation / Response:  Fully participated with the group by sharing personal reflections / insights and openly received / provided feedback with other participants.    Demonstrated understanding of topics discussed through group discussion and participation, Identified / Expressed personal readiness to practice skills and Verbalized understanding of health maintenance topic    Treatment Plan:  Patient has See Epic Treatment Plan - Patient is discharging.    Cheikh Burnett LMFT

## 2021-08-16 NOTE — GROUP NOTE
Psychotherapy Group Note    PATIENT'S NAME: Ruth Johnston  MRN:   [id number]  :   1965  ACCT. NUMBER: [id number]  DATE OF SERVICE: 21  START TIME: 10:00 AM  END TIME: 10:50 AM  FACILITATOR: Cheikh Burnett LMFT  TOPIC:  EBP Group: Relationship Skills  [facility] Adult Partial Hospitalization Program  TRACK: [facility]    NUMBER OF PARTICIPANTS: 7    Summary of Group / Topics Discussed:  Relationship Skills: Relationship Mapping: Patients identified different types of relationships they have in their life and examined if there is conflict or closeness, the degree of conflict or closeness, and the reason for conflict. The goal of this topic is to help patients gain awareness of the relationships they have with others, identify types of conflict in patients  lives and how they impact symptoms/functioning, and identify how they can improve relationships with relationship and interpersonal skills they have learned.     Patient Session Goals / Objectives:    Familiarized patients with awareness to the different types of relationships they may have with different people and substances in their lives    Discussed and practiced strategies to promote healthier understanding of interpersonal relationships with a focus on awareness of conflict, the causes of conflict, and the ways to transform conflict    Discussed the use of substances and its impact on their relationships    Service Modality:  Video Visit     Telemedicine Visit: The patient's condition can be safely assessed and treated via synchronous audio and visual telemedicine encounter.      Reason for Telemedicine Visit: Services only offered telehealth and due to COVID-19.    Originating Site (Patient Location): Patient's home    Distant Site (Provider Location): Provider Remote Setting- Home Office    Consent:  The patient/guardian has verbally consented to: the potential risks and benefits of telemedicine (video visit) versus in person care;  bill my insurance or make self-payment for services provided; and responsibility for payment of non-covered services.     Patient would like the video invitation sent by:  My Chart and email    Mode of Communication:  Video Conference via Medical Zoom    As the provider I attest to compliance with applicable laws and regulations related to telemedicine.      Patient Participation / Response:  Fully participated with the group by sharing personal reflections / insights and openly received / provided feedback with other participants.    Demonstrated understanding of topics discussed through group discussion and participation, Demonstrated understanding of relationship skills and communication skills and Practiced skills in session    Treatment Plan:  Patient has See Epic Treatment Plan - Patient is discharging.    Chekih Burnett LMFT

## 2021-08-16 NOTE — GROUP NOTE
Psychotherapy Group Note    PATIENT'S NAME: Ruth Johnston  MRN:   [id number]  :   1965  ACCT. NUMBER: [id number]  DATE OF SERVICE: 21  START TIME:  2:00 PM  END TIME:  2:50 PM  FACILITATOR: Cheikh Burnett LMFT  TOPIC:  EBP Group: Self-Awareness  [facility] Adult Partial Hospitalization Program  TRACK: [facility]    NUMBER OF PARTICIPANTS: 7    Summary of Group / Topics Discussed:  Self-Awareness: Self-Compassion: Patients received overview of key concepts in developing self-compassion. Patients discussed mindfulness, self-kindness, and finding common humanity. Patients identified their current approach to problems in their lives and learned skills for increasing self-compassion. Patients identified ways they can put self-compassion skills into practice and problem solve barriers to application of skills.     Patient Session Goals / Objectives:    [place] components of self-compassion    Identify ways to practice self-compassion in daily life    Problem solve barriers to self-compassion practice    Service Modality:  Video Visit     Telemedicine Visit: The patient's condition can be safely assessed and treated via synchronous audio and visual telemedicine encounter.      Reason for Telemedicine Visit: Services only offered telehealth and due to COVID-19.    Originating Site (Patient Location): Patient's home    Distant Site (Provider Location): Provider Remote Setting- Home Office    Consent:  The patient/guardian has verbally consented to: the potential risks and benefits of telemedicine (video visit) versus in person care; bill my insurance or make self-payment for services provided; and responsibility for payment of non-covered services.     Patient would like the video invitation sent by:  My Chart and email    Mode of Communication:  Video Conference via Medical Zoom    As the provider I attest to compliance with applicable laws and regulations related to telemedicine.      Patient  Participation / Response:  Fully participated with the group by sharing personal reflections / insights and openly received / provided feedback with other participants.    Demonstrated understanding of topics discussed through group discussion and participation, Demonstrated understanding of values, strengths, and challenges to learn about themselves and Practiced skills in session    Treatment Plan:  Patient has See Epic Treatment Plan - Patient is discharging.    Cheikh Burnett LMFT

## 2021-08-17 NOTE — TELEPHONE ENCOUNTER
"RN Review of Medical History / Physical Health Screen  Outpatient Mental Health Programs - [facility] Adult Partial Hospitalization Program    PATIENT'S NAME: Ruth Johnston  MRN:   [id number]  :   1965  ACCT. NUMBER: [id number]  CURRENT AGE:  55 year old    DATE OF DIAGNOSTIC ASSESSMENT: 21  DATE OF ADMISSION: 21     Please see Diagnostic Assessment for additional Medical History.     General Health:   Have you had any exposure to any communicable disease in the past 2-3 weeks? no     Are you aware of safe sex practices? yes       Nutrition:    Are you on a special diet? If yes, please explain:  no   Do you have any concerns regarding your nutritional status? If yes, please explain:  no   Have you had any appetite changes in the last 3 months?  Yes, decreased with sxs     Have you had any weight loss or weight gain in the last 3 months?  No     Do you have a history of an eating disorder? yes bulimia   Do you have a history of being in an eating disorder program? no     Patient height and weight recorded by RN in epic flowsheet: no    No; Unable to measure  Because of temporary in-person programmatic suspension due to COVID-19 pandemic, all pt weights and heights will be collected through patient self-report an recorded in physical health screening progress note upon admission to the program.                            Height/Weight Review:  Patient reported height:     5'3\"   Patient reports weight:  Date last checked:  132lb   Any referrals/needs identified?     no     BMI Review:  Was the patient informed of BMI? no      Findings No Intervention         Fall Risk:   Have you had any falls in the past 3 months? no     Do you currently use any assistive devices for mobility?   no      Additional Comments/Assessment: denies outstanding medical concerns. Sees PCP    Per completion of the Medical History / Physical Health Screen, is there a recommendation to see / follow up with a " primary care physician/clinic or dentist?    No.      Sharonda Redd RN  8/17/2021

## 2021-08-20 ENCOUNTER — HOSPITAL ENCOUNTER (OUTPATIENT)
Dept: BEHAVIORAL HEALTH | Facility: CLINIC | Age: 56
End: 2021-08-20
Attending: PSYCHIATRY & NEUROLOGY
Payer: COMMERCIAL

## 2021-08-20 PROCEDURE — 90853 GROUP PSYCHOTHERAPY: CPT | Mod: GT | Performed by: MARRIAGE & FAMILY THERAPIST

## 2021-08-20 PROCEDURE — 90853 GROUP PSYCHOTHERAPY: CPT | Mod: GT | Performed by: SOCIAL WORKER

## 2021-08-20 NOTE — GROUP NOTE
Process Group Note    PATIENT'S NAME: Ruth Johnston  MRN:   [id number]  :   1965  ACCT. NUMBER: [id number]  DATE OF SERVICE: 21  START TIME:  2:00 PM  END TIME:  2:50 PM  FACILITATOR: Delio Cobb LMFT  TOPIC:  Process Group    Diagnoses:  296.33 (F33.2) Major Depressive Disorder, Recurrent Episode, Severe     4. Other Diagnoses that is relevant to services:   300.02 (F41.1) Generalized Anxiety Disorder  5. Provisional Diagnosis:  309.81 (F43.10) Posttraumatic Stress Disorder with dissociative symptoms and  301.83 (F60.3) Borderline Personality Disorder  as evidenced by patient's self report.       [facility] Day Treatment  TRACK:     NUMBER OF PARTICIPANTS: 7                                      Service Modality:  Video Visit     Telemedicine Visit: The patient's condition can be safely assessed and treated via synchronous audio and visual telemedicine encounter.      Reason for Telemedicine Visit: Services only offered telehealth    Originating Site (Patient Location): Patient's home    Distant Site (Provider Location): Provider Remote Setting- Home Office    Consent:  The patient/guardian has verbally consented to: the potential risks and benefits of telemedicine (video visit) versus in person care; bill my insurance or make self-payment for services provided; and responsibility for payment of non-covered services.     Patient would like the video invitation sent by:  My Chart    Mode of Communication:  Video Conference via Medical Zoom    As the provider I attest to compliance with applicable laws and regulations related to telemedicine.               Data:    Session content: At the start of this group, patients were invited to check in by identifying themselves, describing their current emotional status, and identifying issues to address in this group.   Area(s) of treatment focus addressed in this session included Symptom Management and Personal  Safety.    aditya reports feeling overwhelmed, is feeling sadness and grief, her goal for the day is to see her brother and enjoy her time with him and staying mindful and present, is struggling with thoughts, denies safety concerns, reports drinking a beer last night, is taking rx, is feeling proud of her birthday present to herself and got her new puppy, service animal in training, proud of taking care of herself, Client declined additional process time but contributed to group discussion and provided feedback and support to peers.    Therapeutic Interventions/Treatment Strategies:  Psychotherapist offered support, feedback and validation and reinforced use of skills. Treatment modalities used include Motivational Interviewing, Cognitive Behavioral Therapy and Dialectical Behavioral Therapy.    Assessment:    Patient response:   Patient responded to session by listening, being attentive and appearing alert    Possible barriers to participation / learning include: and no barriers identified    Health Issues:   None reported       Substance Use Review:   Substance Use: No active concerns identified.    Mental Status/Behavioral Observations  Appearance:   Appropriate   Eye Contact:   Good   Psychomotor Behavior: Normal   Attitude:   Cooperative   Orientation:   All  Speech   Rate / Production: Normal    Volume:  Normal   Mood:    Anxious  Sad   Affect:    Tearful  Thought Content:   Clear and Safety denies any current safety concerns including suicidal ideation, self-harm, and homicidal ideation  Thought Form:  Coherent  Logical     Insight:    Good     Plan:     Safety Plan: No current safety concerns identified.  Recommended that patient call 911 or go to the local ED should there be a change in any of these risk factors.     Barriers to treatment: None identified    Patient Contracts (see media tab):  None    Substance Use: Not addressed in session     Continue or Discharge: Patient will continue in Adult Day  Treatment (ADT)  as planned. Patient is likely to benefit from learning and using skills as they work toward the goals identified in their treatment plan.      Felipe Cobb, LMFT  August 20, 2021

## 2021-08-20 NOTE — GROUP NOTE
Psychoeducation Group Note    PATIENT'S NAME: Ruth Johnston  MRN:   [id number]  :   1965  ACCT. NUMBER: [id number]  DATE OF SERVICE: 21  START TIME:  1:00 PM  END TIME:  1:50 PM  FACILITATOR: Faby Alvarenga LICSW  TOPIC: LUDIN RN Group: Health Maintenance  [facility] Adult Mental Health Day Treatment  TRACK: 7B    NUMBER OF PARTICIPANTS: 7    Summary of Group / Topics Discussed:  Health Maintenance: Weekend planning: Patients were given time to complete a weekend plan of what they will do to promote wellness and sobriety over the weekend when they do not have the structure of group. Patients were encouraged to review progress on their treatment goals and were challenged to identify ways to work toward meeting them. Patients identified and discussed foreseeable barriers to success over the weekend and then developed a plan to overcome them. Patients reviewed their distress coping skills and social support network and discussed this with the group.       Patient Session Goals / Objectives:    ?    Identified activities to engage in that promote balance in wellness  ?    Distinguished possible barriers to success over the weekend and created a plan to overcome them  ?    Listed distress coping skills and identified social support network to utilize if in crisis during the weekend                                    Service Modality:  Video Visit     Telemedicine Visit: The patient's condition can be safely assessed and treated via synchronous audio and visual telemedicine encounter.      Reason for Telemedicine Visit: Services only offered telehealth and covid19    Originating Site (Patient Location): Patient's home    Distant Site (Provider Location): Provider Remote Setting- Home Office    Consent:  The patient/guardian has verbally consented to: the potential risks and benefits of telemedicine (video visit) versus in person care; bill my insurance or make self-payment for services provided; and  responsibility for payment of non-covered services.     Patient would like the video invitation sent by:  My Chart    Mode of Communication:  Video Conference via Medical Zoom    As the provider I attest to compliance with applicable laws and regulations related to telemedicine.               Patient Participation / Response:  Fully participated with the group by sharing personal reflections / insights and openly received / provided feedback with other participants.    Demonstrated understanding of topics discussed through group discussion and participation and Identified / Expressed personal readiness to practice skills    Treatment Plan:  Patient has an initial individualized treatment plan that was created as part of their diagnostic assessment / admission process.  A master individualized treatment plan is in the process of being developed with the patient and multi-disciplinary care team.    ALEX MedinaSW

## 2021-08-24 ENCOUNTER — HOSPITAL ENCOUNTER (OUTPATIENT)
Dept: BEHAVIORAL HEALTH | Facility: CLINIC | Age: 56
End: 2021-08-24
Attending: PSYCHIATRY & NEUROLOGY
Payer: COMMERCIAL

## 2021-08-24 PROCEDURE — 90832 PSYTX W PT 30 MINUTES: CPT | Mod: GT | Performed by: PSYCHOLOGIST

## 2021-08-24 PROCEDURE — 90853 GROUP PSYCHOTHERAPY: CPT | Mod: GT | Performed by: MARRIAGE & FAMILY THERAPIST

## 2021-08-24 PROCEDURE — 99207 PR NO CHARGE LOS: CPT | Performed by: PSYCHOLOGIST

## 2021-08-24 PROCEDURE — 90853 GROUP PSYCHOTHERAPY: CPT | Mod: 95 | Performed by: MARRIAGE & FAMILY THERAPIST

## 2021-08-24 PROCEDURE — 90853 GROUP PSYCHOTHERAPY: CPT | Mod: 95 | Performed by: SOCIAL WORKER

## 2021-08-24 PROCEDURE — 90832 PSYTX W PT 30 MINUTES: CPT | Mod: 95,59 | Performed by: PSYCHOLOGIST

## 2021-08-24 ASSESSMENT — ANXIETY QUESTIONNAIRES
2. NOT BEING ABLE TO STOP OR CONTROL WORRYING: SEVERAL DAYS
GAD7 TOTAL SCORE: 16
7. FEELING AFRAID AS IF SOMETHING AWFUL MIGHT HAPPEN: NEARLY EVERY DAY
IF YOU CHECKED OFF ANY PROBLEMS ON THIS QUESTIONNAIRE, HOW DIFFICULT HAVE THESE PROBLEMS MADE IT FOR YOU TO DO YOUR WORK, TAKE CARE OF THINGS AT HOME, OR GET ALONG WITH OTHER PEOPLE: VERY DIFFICULT
5. BEING SO RESTLESS THAT IT IS HARD TO SIT STILL: MORE THAN HALF THE DAYS
3. WORRYING TOO MUCH ABOUT DIFFERENT THINGS: NEARLY EVERY DAY
1. FEELING NERVOUS, ANXIOUS, OR ON EDGE: NEARLY EVERY DAY
6. BECOMING EASILY ANNOYED OR IRRITABLE: SEVERAL DAYS

## 2021-08-24 ASSESSMENT — PATIENT HEALTH QUESTIONNAIRE - PHQ9
5. POOR APPETITE OR OVEREATING: NEARLY EVERY DAY
SUM OF ALL RESPONSES TO PHQ QUESTIONS 1-9: 20

## 2021-08-24 NOTE — GROUP NOTE
Psychotherapy Group Note    PATIENT'S NAME: Ruth Johnston  MRN:   [id number]  :   1965  ACCT. NUMBER: [id number]  DATE OF SERVICE: 21  START TIME:  3:00 PM  END TIME:  3:50 PM  FACILITATOR: Delio Cobb LMFT  TOPIC:  EBP Group: Emotions Management  [facility] Mental Health Day Treatment  TRACK: 7b    NUMBER OF PARTICIPANTS: 6                                      Service Modality:  Video Visit     Telemedicine Visit: The patient's condition can be safely assessed and treated via synchronous audio and visual telemedicine encounter.      Reason for Telemedicine Visit: Services only offered telehealth    Originating Site (Patient Location): Patient's home    Distant Site (Provider Location): Provider Remote Setting- Home Office    Consent:  The patient/guardian has verbally consented to: the potential risks and benefits of telemedicine (video visit) versus in person care; bill my insurance or make self-payment for services provided; and responsibility for payment of non-covered services.     Patient would like the video invitation sent by:  My Chart    Mode of Communication:  Video Conference via Medical Zoom    As the provider I attest to compliance with applicable laws and regulations related to telemedicine.         Summary of Group / Topics Discussed:  Emotions Management: Emotions and the Brain: Patients discussed the purpose of the emotions including the biological basis of emotion, with an emphasis on how biology underlies our experience of emotion.  Also reviewed the impact past experiences, sensory input, heredity, culture and other factors have on the development of our brain / emotional development.   Reviewed the biological basis of emotion, with an emphasis on how biology underlies our experience of emotion.    Patient Session Goals / Objectives:    Explore what patients know about the brain and how it relates to emotions.    Apply understanding of content to their  own experiences of emotions.    Demonstrate awareness of how their body reacts to stress through group discussion/participation.    Discuss patient experiences related to mind/body connection; physical and emotional responses to stress.        Patient Participation / Response:  Moderately participated, sharing some personal reflections / insights and adequately adequately received / provided feedback with other participants.    Demonstrated understanding of topics discussed through group discussion and participation and Expressed understanding of the relevance / importance of emotions management skills at distressing times in life    Treatment Plan:  Patient has a current master individualized treatment plan.  See Epic treatment plan for more information.    Felipe Cobb LMFT

## 2021-08-24 NOTE — GROUP NOTE
Psychoeducation Group Note    PATIENT'S NAME: Ruth Johnston  MRN:   [id number]  :   1965  ACCT. NUMBER: [id number]  DATE OF SERVICE: 21  START TIME:  1:00 PM  END TIME:  1:50 PM  FACILITATOR: Faby Alvarenga LICSW  TOPIC: LUDIN RN Group: Health Maintenance  [facility] Adult Mental Health Day Treatment  TRACK: 7B    NUMBER OF PARTICIPANTS: 6    Summary of Group / Topics Discussed:  Health Maintenance: Wellness Check-in: Patients met with group facilitator to individually review a holistic wellness check-in to assess patient medication adherence/concerns, appointments, physical and mental health, exercise, nutrition, sleep, socialization, substance use, and need for service/resource referrals.       Patient Session Goals / Objectives:    Discussed various aspects of health management and self-care related to physical and mental health    Demonstrated increased self-awareness of current wellness needs    Developed health literacy skills in navigating the healthcare system and self-advocacy/communicating needs with health care team                                        Service Modality:  Video Visit         Telemedicine Visit: The patient's condition can be safely assessed and treated via synchronous audio and visual telemedicine encounter.          Reason for Telemedicine Visit: Services only offered telehealth and covid19        Originating Site (Patient Location): Patient's home        Distant Site (Provider Location): Provider Remote Setting- Home Office        Consent:  The patient/guardian has verbally consented to: the potential risks and benefits of telemedicine (video visit) versus in person care; bill my insurance or make self-payment for services provided; and responsibility for payment of non-covered services.         Patient would like the video invitation sent by:  My Chart        Mode of Communication:  Video Conference via Medical Zoom        As the provider I attest to compliance  with applicable laws and regulations related to telemedicine.                 Patient Participation / Response:  Fully participated with the group by sharing personal reflections / insights and openly received / provided feedback with other participants.    Demonstrated understanding of topics discussed through group discussion and participation, Identified / Expressed personal readiness to practice skills and Verbalized understanding of health maintenance topic    Treatment Plan:  Patient has an initial individualized treatment plan that was created as part of their diagnostic assessment / admission process.  A master individualized treatment plan is in the process of being developed with the patient and multi-disciplinary care team.    ALEX MedinaSW
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
## 2021-08-24 NOTE — GROUP NOTE
Process Group Note    PATIENT'S NAME: Ruth Johnston  MRN:   [id number]  :   1965  ACCT. NUMBER: [id number]  DATE OF SERVICE: 21  START TIME:  2:00 PM  END TIME:  2:50 PM  FACILITATOR: Delio Cobb LMFT  TOPIC:  Process Group    Diagnoses:  296.33 (F33.2) Major Depressive Disorder, Recurrent Episode, Severe     4. Other Diagnoses that is relevant to services:   300.02 (F41.1) Generalized Anxiety Disorder  5. Provisional Diagnosis:  309.81 (F43.10) Posttraumatic Stress Disorder with dissociative symptoms and  301.83 (F60.3) Borderline Personality Disorder  as evidenced by patient's self report.       [facility] Day Treatment  TRACK:     NUMBER OF PARTICIPANTS: 6                                      Service Modality:  Video Visit     Telemedicine Visit: The patient's condition can be safely assessed and treated via synchronous audio and visual telemedicine encounter.      Reason for Telemedicine Visit: Services only offered telehealth    Originating Site (Patient Location): Patient's home    Distant Site (Provider Location): Provider Remote Setting- Home Office    Consent:  The patient/guardian has verbally consented to: the potential risks and benefits of telemedicine (video visit) versus in person care; bill my insurance or make self-payment for services provided; and responsibility for payment of non-covered services.     Patient would like the video invitation sent by:  My Chart    Mode of Communication:  Video Conference via Medical Zoom    As the provider I attest to compliance with applicable laws and regulations related to telemedicine.               Data:    Session content: At the start of this group, patients were invited to check in by identifying themselves, describing their current emotional status, and identifying issues to address in this group.   Area(s) of treatment focus addressed in this session included Symptom Management and Personal  Safety.    aditya reports feeling sad today, experiencing pain and grief and sorrow, insecure and anxious, her goal is to keep breathing and activate her vagus nerve, to regulate better, she was able to get groceries delivered today for the first time in a long time, is working on taking care of her heart, is using mindfulness, is using organizational skills too, is struggling with emotional regulation, denies safety concerns, used thc, is taking rx as prescribed, is proud of herself for going to a concert on Thursday and is still going even though she is broken up now, processed about wanting to set boundaries and use assertive communication    Therapeutic Interventions/Treatment Strategies:  Psychotherapist offered support, feedback and validation and reinforced use of skills. Treatment modalities used include Motivational Interviewing, Cognitive Behavioral Therapy and Dialectical Behavioral Therapy. Interventions include Relationship Skills: Assisted patients in implementing more effective communication skills in their relationships and Encouraged development and maintenance  of healthy boundaries.    Assessment:    Patient response:   Patient responded to session by accepting feedback, giving feedback, listening, being attentive and appearing alert    Possible barriers to participation / learning include: and no barriers identified    Health Issues:   None reported       Substance Use Review:   Substance Use: No active concerns identified.    Mental Status/Behavioral Observations  Appearance:   Appropriate   Eye Contact:   Good   Psychomotor Behavior: Normal   Attitude:   Cooperative   Orientation:   All  Speech   Rate / Production: Normal    Volume:  Normal   Mood:    Anxious   Affect:    Tearful  Thought Content:   Clear and Safety denies any current safety concerns including suicidal ideation, self-harm, and homicidal ideation  Thought Form:  Coherent  Logical     Insight:    Good     Plan:     Safety Plan:  No current safety concerns identified.  Recommended that patient call 911 or go to the local ED should there be a change in any of these risk factors.     Barriers to treatment: None identified    Patient Contracts (see media tab):  None    Substance Use: Not addressed in session     Continue or Discharge: Patient will continue in Adult Day Treatment (ADT)  as planned. Patient is likely to benefit from learning and using skills as they work toward the goals identified in their treatment plan.      Felipe Cobb, LMFT  August 24, 2021

## 2021-08-25 ENCOUNTER — HOSPITAL ENCOUNTER (OUTPATIENT)
Dept: BEHAVIORAL HEALTH | Facility: CLINIC | Age: 56
End: 2021-08-25
Attending: PSYCHIATRY & NEUROLOGY
Payer: COMMERCIAL

## 2021-08-25 PROCEDURE — 90853 GROUP PSYCHOTHERAPY: CPT | Mod: 95 | Performed by: MARRIAGE & FAMILY THERAPIST

## 2021-08-25 ASSESSMENT — ANXIETY QUESTIONNAIRES: GAD7 TOTAL SCORE: 16

## 2021-08-25 NOTE — GROUP NOTE
Psychotherapy Group Note    PATIENT'S NAME: Ruth Johnston  MRN:   [id number]  :   1965  ACCT. NUMBER: [id number]  DATE OF SERVICE: 21  START TIME:  3:00 PM  END TIME:  3:50 PM  FACILITATOR: Delio Cobb LMFT  TOPIC:  EBP Group: Emotions Management  [facility] Mental Health Day Treatment  TRACK: 7B    NUMBER OF PARTICIPANTS: 6                                      Service Modality:  Video Visit     Telemedicine Visit: The patient's condition can be safely assessed and treated via synchronous audio and visual telemedicine encounter.      Reason for Telemedicine Visit: Services only offered telehealth    Originating Site (Patient Location): Patient's home    Distant Site (Provider Location): Provider Remote Setting- Home Office    Consent:  The patient/guardian has verbally consented to: the potential risks and benefits of telemedicine (video visit) versus in person care; bill my insurance or make self-payment for services provided; and responsibility for payment of non-covered services.     Patient would like the video invitation sent by:  My Chart    Mode of Communication:  Video Conference via Medical Zoom    As the provider I attest to compliance with applicable laws and regulations related to telemedicine.         Summary of Group / Topics Discussed:  Emotions Management: Model of Emotions: Patients were introduced to the cyclical model of emotions.  Explored emotions are shaped by different events and one s interpretation of events.  Group discussed how emotions begin with an event, followed by one s interpretation, followed by associated feelings.  Discussion included a review of personal urges and actions that can/do follow an emotional experience in the patient s life, and the end results.    Patient Session Goals / Objectives:    Demonstrate understanding of types various emotions.    Identify and discuss specific emotions and when they occur; understand  triggers.    Identify individual emotions and physical sensations that accompany them.    Discuss urges and actions, and how to influence the intensity of emotional reactions and disrupt the cycle.      Discuss barriers to emotional regulation.    Choose 1-2 strategies to assist with emotional response to potentially distressing situations.      Patient Participation / Response:  Moderately participated, sharing some personal reflections / insights and adequately adequately received / provided feedback with other participants.    Demonstrated understanding of topics discussed through group discussion and participation and Expressed understanding of the relevance / importance of emotions management skills at distressing times in life    Treatment Plan:  Patient has an initial individualized treatment plan that was created as part of their diagnostic assessment / admission process.  A master individualized treatment plan is in the process of being developed with the patient and multi-disciplinary care team.    Felipe Cobb LMFT

## 2021-08-25 NOTE — GROUP NOTE
Psychoeducation Group Note    PATIENT'S NAME: Ruth Johnston  MRN:   [id number]  :   1965  ACCT. NUMBER: [id number]  DATE OF SERVICE: 21  START TIME:  2:00 PM  END TIME:  2:50 PM  FACILITATOR: Delio Cobb LMFT  TOPIC: MH RN Group: Mental Health Maintenance  [facility] Adult Mental Health Day Treatment  TRACK: 7B    NUMBER OF PARTICIPANTS: 7                                      Service Modality:  Video Visit     Telemedicine Visit: The patient's condition can be safely assessed and treated via synchronous audio and visual telemedicine encounter.      Reason for Telemedicine Visit: Services only offered telehealth    Originating Site (Patient Location): Patient's home    Distant Site (Provider Location): Provider Remote Setting- Home Office    Consent:  The patient/guardian has verbally consented to: the potential risks and benefits of telemedicine (video visit) versus in person care; bill my insurance or make self-payment for services provided; and responsibility for payment of non-covered services.     Patient would like the video invitation sent by:  My Chart    Mode of Communication:  Video Conference via Medical Zoom    As the provider I attest to compliance with applicable laws and regulations related to telemedicine.         Summary of Group / Topics Discussed:  Mental Health Maintenance:  Shame: In this group, the concept of Shame was explored through the viewing, discussion, and self-reflection of the Benoit Andrew Talk Titled,  Listening to shame.      Patient Session Goals / Objectives:  ? Defined and described definition of shame  ? Identified 2 or more ways of practicing authenticity and empathy        Patient Participation / Response:  Moderately participated, sharing some personal reflections / insights and adequately adequately received / provided feedback with other participants.    Demonstrated understanding of topics discussed through group discussion and  participation, Identified / Expressed personal readiness to practice skills and Verbalized understanding of mental health maintenance topic    Treatment Plan:  Patient has an initial individualized treatment plan that was created as part of their diagnostic assessment / admission process.  A master individualized treatment plan is in the process of being developed with the patient and multi-disciplinary care team.    Felipe Cobb LMFT

## 2021-08-26 NOTE — PROGRESS NOTES
Adult Day Treatment Program:  Individualized Treatment Plan       Date of Plan: 2021    Name: Ruth Johnston MRN: [id number]    : 1965     Program: Adult Day Treatment Program (ADT)    Clinical Track: 7B    DSM5 Diagnosis:  296.33 (F33.2) Major Depressive Disorder, Recurrent Episode, Severe     4. Other Diagnoses that is relevant to services:   300.02 (F41.1) Generalized Anxiety Disorder  5. Provisional Diagnosis:  309.81 (F43.10) Posttraumatic Stress Disorder with dissociative symptoms and  301.83 (F60.3) Borderline Personality Disorder  as evidenced by patient's self report.       55+ Multidisciplinary Team Members:  Dr Lang Leger MD and/or Dr. Adriana Olivier PsyD, , and/or Dr. Maximino Mckoy MD,  Felipe Cobb, LMFT LADC; Faby Alvarenga [facility]  Ruth Johnston will participate in the Adult Day Treatment Program 3 days per week, 3 hours per day.   Anticipated duration/discharge: 12 weeks    Due to COVID-19, services will be delivered via telemedicine until further notice.     Program Start Date: 2021  Anticipated Discharge Date: 2021 (pending authorization/clinical changes)    NOTE: Complete CGI     Review Date: Does Ruth Johnston continue to meet criteria to participate in the ADT Program, 3 days per week; 3 hours per day?   10/27                      Client Strengths:  has a previous history of therapy, open to learning, open to suggestions / feedback, willing to ask questions and willing to relate to others    Client Participation in Plan:  Contributed to goals and plan   Agrees with plan   Received copy of treatment plan   Discussed with staff     Areas of Vulnerability:  Anxiety  Depressive symptoms   Trauma/Abuse/Neglect    Long-Term Goals:  Knowledge about illness and management of symptoms   Maintenance of personal safety   Effective management of impulsivity     Abuse Prevention Plan:  Safe, therapeutic environment   Safety coping plan as needed    Education regarding illness and skill development   Impluse control education and intervention     Discharge Criteria:  Satisfactory progress toward treatment goals   Improvement re: identified problems and symptoms   Ability to continue recovery at next level of service   Has a discharge plan in place   Regular attendance as scheduled     Areas of Treatment Focus        Area of Treatment Focus:   Personal Safety  Start Date:    8/27/2021    Goal:  Target Date: 10/27/2021, 11/12/21 Status: Stopped  Client will notify staff when needing assistance to develop or implement a coping plan to manage suicidal or self injurious urges.  Client will use coping plan for safety, as needed.      Progress: 9/1/2021 Harvey is needing to discharge due to financial concerns, she will be returning to work earlier than expected.  She has remained safe, has been working on practicing coping skills and increasing self compassion, has been using her support network and setting boundaries.             Treatment Strategies:   Assess / reassess level of potential for harm to self or others  Engage in safety planning when indicated        Area of Treatment Focus:   Symptom Stabilization and Management  Start Date:    8/27/2021    Goal:  Target Date: 10/27/2021, 11/12/2021 Status: Stopped  Will increase self-awareness, self-validation, authenticity. Use journaling to identify your experience of events in your daily life. and Will learn and practice 1-2 coping skills to help improve anxiety managment       Progress:9/1/2021 Harvey is needing to discharge due to financial concerns, she will be returning to work earlier than expected.  She has remained safe, has been working on practicing coping skills and increasing self compassion, has been using her support network and setting boundaries.             Treatment Strategies:   Teach adaptive coping skills and communication skills  Use reality based supportive approach        Area of Treatment  "Focus:   Community Resources / Support and Discharge Planning  Start Date:    8/27/2021    Goal:  Target Date: 10/27/2021, 11/12/2021 Status: Stopped  Will develop an aftercare / transition plan by returning to work and seeing therapist      Progress:9/1/2021 Harvey is needing to discharge due to financial concerns, she will be returning to work earlier than expected.  She has remained safe, has been working on practicing coping skills and increasing self compassion, has been using her support network and setting boundaries.             Treatment Strategies:   Assist with discharge planning  Teach adaptive coping skills and communication skills  Use reality based supportive approach       Felipe Cobb, LMFT      NOTE: Required signatures are completed manually and scanned into the electronic medical record. See \"Media\" tab in epic.    The Individualized Treatment Plan Signature Page has been routed to the provider for co-sign.        "

## 2021-09-01 ENCOUNTER — HOSPITAL ENCOUNTER (OUTPATIENT)
Dept: BEHAVIORAL HEALTH | Facility: CLINIC | Age: 56
End: 2021-09-01
Attending: PSYCHIATRY & NEUROLOGY
Payer: COMMERCIAL

## 2021-09-01 NOTE — TELEPHONE ENCOUNTER
----- Message from VANNESA Mckee sent at 9/1/2021  4:33 PM CDT -----  Regarding: discharge  Harvey has discharged from day treatment as of today, please remove upcoming appts from elizabeth

## 2021-09-01 NOTE — PROGRESS NOTES
Acknowledgement of Current Treatment Plan       I have reviewed my treatment plan with my therapist on 8/27/2021.   I agree with the plan as it is written in the electronic health record. (7B)    Name:      Signature:  Ruth Johnston   Unable to sign due to covid 19 protocols   Dr Lang Leger MD  Psychiatrist    VANNESA Maria  Psychotherapist VANNESA Trimble on 9/1/2021 at 9:11 AM

## 2021-09-01 NOTE — GROUP NOTE
Process Group Note    PATIENT'S NAME: Ruth Johnston  MRN:   [id number]  :   1965  ACCT. NUMBER: [id number]  DATE OF SERVICE: 21  START TIME:  1:00 PM  END TIME:  1:50 PM  FACILITATOR: Delio Cobb LMFT  TOPIC: MH Process Group    Diagnoses:  ***    [facility] Day Treatment  TRACK: 7B    NUMBER OF PARTICIPANTS: 5                                      Service Modality:  Video Visit     Telemedicine Visit: The patient's condition can be safely assessed and treated via synchronous audio and visual telemedicine encounter.      Reason for Telemedicine Visit: Services only offered telehealth    Originating Site (Patient Location): Patient's home    Distant Site (Provider Location): Provider Remote Setting- Home Office    Consent:  The patient/guardian has verbally consented to: the potential risks and benefits of telemedicine (video visit) versus in person care; bill my insurance or make self-payment for services provided; and responsibility for payment of non-covered services.     Patient would like the video invitation sent by:  My Chart    Mode of Communication:  Video Conference via Medical Zoom    As the provider I attest to compliance with applicable laws and regulations related to telemedicine.               Data:    Session content: At the start of this group, patients were invited to check in by identifying themselves, describing their current emotional status, and identifying issues to address in this group.   Area(s) of treatment focus addressed in this session included {OP BEH ADULT MH AREA OF TREATMENT FOCUS:635293}.  ***    Therapeutic Interventions/Treatment Strategies:  {OP  THERAPEUTIC INTERVENTIONS/TREATMENT:903382}    Assessment:    Patient response:   Patient responded to session by {PATIENT RESPONSE:537642}    Possible barriers to participation / learning include: {POSSIBLE BARRIERS:615411}    Health Issues:   {YES / NO:436289}       Substance Use  "Review:   Substance Use: {YES / NO:714878}    Mental Status/Behavioral Observations  Appearance:   {Appearance:059671::\"Appropriate \"}  Eye Contact:   {Eye Contact:389078::\"Good \"}  Psychomotor Behavior: {Psychomotor Behavior:941060::\"Normal \"}  Attitude:   {Attitude:007648::\"Cooperative \"}  Orientation:   {Orientation:528612::\"All\"}  Speech   Rate / Production: {Speech Rate/Production:135066::\"Normal \"}   Volume:  {Speech Volume:654698::\"Normal \"}  Mood:    {Mood:584728::\"Normal\"}  Affect:    {Affect:091004::\"Appropriate \"}  Thought Content:   {Thought Content with Safety:927793}  Thought Form:  {Thought Form:734149::\"Coherent \",\"Logical \"}    Insight:    {Insight:426661::\"Good \"}    Plan:     Safety Plan: {SAFETY PLAN:835057}     Barriers to treatment: {Barriers to Treatment:274723}    Patient Contracts (see media tab):  {Patient Contracts:574498}    Substance Use: {SUBSTANCE USE ASSESSMENT/INTERVENTION:237224}     Continue or Discharge: {Continue or Discharge:146480}      Felipe Cobb, FT  September 1, 2021  "

## 2021-09-01 NOTE — DISCHARGE SUMMARY
Adult Mental Health Intensive Outpatient Discharge Summary/Instructions      Patient: Ruth Johnston MRN: [id number]   : 1965 Age: 56 year old Sex: adult     Admission Date: 2021  Discharge Date: 2021  Diagnosis: 296.33 (F33.2) Major Depressive Disorder, Recurrent Episode, Severe     4. Other Diagnoses that is relevant to services:   300.02 (F41.1) Generalized Anxiety Disorder  5. Provisional Diagnosis:  309.81 (F43.10) Posttraumatic Stress Disorder with dissociative symptoms and  301.83 (F60.3) Borderline Personality Disorder  as evidenced by patient's self report.       Focus of Treatment / Progress  Climb Program Through [facility]-Workbook Series-Starting .  Continue writing, spending time in nature, talking to your support system.  Practice self love-make new memories with positive experiences.  Remember that grief is a process!  Continue the process of checking into cannabis as a treatment option for anxiety.  Harvey is was an absolute pleasure working with you in our day treatment program, you are a strong, resilient, fierce person who has gone through a lot over the years and is still standing and fighting for a life that you are worthy of.  I hope you are proud of yourself because I am so proud of you!  You are so talented and curious, it will take you far.  Please feel free to give us a call if you have further questions or need assistance!  [phone].    Personal Safety:      * Follow your safety plan     * Call crisis lines as needed:    [facility] [phone] [facility] [phone]  [facility] [phone] Crisis Connection [phone]  [facility] [phone] [facility] [phone]  [facility] [phone] National Suicide Prevention [phone]  [facility] [phone] Suicide Prevention [phone]  [facility] [phone]    Managing symptoms of:  Depression, anxiety, trauma response    Community support/health:   "[place] [address] ([phone]) [email or web address], [place] Crisis text line( Text [place] to 830571),  [facility]  [place], [place] ([phone])  [facility] [place], [place] ( [phone])    Managing Symptoms and Preventing Relapse    * Go to all of your appointments    * Take all medications as directed.      * Carry a current list if medication with you    * Do not use illicit (street) drugs.  Avoid alcohol    * Report these symptoms to your care team. These are early signs of relapse:   Thoughts of suicide   Losing more sleep   Increased confusion   Mood getting worse   Feeling more aggressive      *Use these skills daily:  Talk to someone you trust at least one time weekly, set boundaries and say \"no\", be assertive, act opposite of negative feelings, accept challenges with a positive attitude, exercise at least three times per week for 30 minutes. Self compassion, challenging cognitive distortions, radical acceptance, opposite action to emotion, grounding, mindfulness, and deep breathing.  TIPP and HALTO!    Copy of summary sent to: Pt via [employer]    Follow up with psychiatrist / main caregiver: Trudy [facility]    Next visit: 9/16-please remember to talk to him about anxiety medication-something that will help you get into a head space where you can follow through and use your skills :)    Follow up with your therapist: Judi N[facility]   Next visit: sees bi-weekly    Go to group therapy and / or support groups at: [facility] and Depression Bipolar Support [place](DBSA) support groups as well as AA, NA, [facility]    See your medical doctor about:  For an annual physical exam or any general wellness or illness as needed.    Other:  Your treatment team appreciates having the opportunity to work with you and wishes you the best.    Client Signature:___unable to sign due to covid 19 protocols___  Staff Signature:__VANNESA Trimble " on 9/1/2021 at 8:47 AM__

## 2021-09-05 ENCOUNTER — HEALTH MAINTENANCE LETTER (OUTPATIENT)
Age: 56
End: 2021-09-05

## 2021-09-10 NOTE — GROUP NOTE
Psychoeducation Group Note    PATIENT'S NAME: Ruth Johnston  MRN:   [id number]  :   1965  ACCT. NUMBER: [id number]  DATE OF SERVICE: 21  START TIME: 11:00 AM  END TIME: 11:50 AM  FACILITATOR: Louise Garcia OTR/L  TOPIC:  PHP OT Group: Self- Regulation Skills  [facility] Adult Partial Hospitalization Program  TRACK: 1    NUMBER OF PARTICIPANTS: 8                                      Service Modality:  Video Visit     Telemedicine Visit: The patient's condition can be safely assessed and treated via synchronous audio and visual telemedicine encounter.      Reason for Telemedicine Visit: Services only offered telehealth    Originating Site (Patient Location): Patient's home    Distant Site (Provider Location): [facility]    Consent:  The patient/guardian has verbally consented to: the potential risks and benefits of telemedicine (video visit) versus in person care; bill my insurance or make self-payment for services provided; and responsibility for payment of non-covered services.     Patient would like the video invitation sent by:  My Chart    Mode of Communication:  Video Conference via Medical Zoom    As the provider I attest to compliance with applicable laws and regulations related to telemedicine.         Summary of Group / Topics Discussed:  Self-Regulation Skills: Coping Through the Senses Introduction: Patients were introduced and taught about neurosensory based skills and strategies related to supporting effective self regulation skills.  Patients were taught about the eight sensory systems (external and internal) and how they can be used for coping with mental health symptoms and stressors.  Patients were provided with an experiential opportunity to increase self-awareness of helpful sensory input and self care activities. Patients were introduced on how to create supportive environments that encourage use of  these skills.    Patient Session Goals / Objectives:    Review/learn the 8 sensory systems and how they relate to self-regulation    Identified specific and individualized neurosensory skills to help when distressed      Identified skills learned and how this applies to current daily life    Established a plan for practice of these skills in their own environments      Patient Participation / Response:  Fully participated with the group by sharing personal reflections / insights and openly received / provided feedback with other participants.    Patient presentation: active in group discussion sharing ideas and asking questions; working on challenging all or nothing thinking, Verbalized understanding of content and Patient would benefit from additional opportunities to practice the content to be able to generalize it to their everyday life with increased intentionality, consistency, and efficacy in support of their psychiatric recovery     Harvey began the Partial Hospitalization Program today.  She was welcomed and oriented to OT groups.  Encouraged Harvey to ask questions as needed.     Treatment Plan:  Patient has an initial individualized treatment plan that was created as part of their diagnostic assessment / admission process.  A master individualized treatment plan is in the process of being developed with the patient and multi-disciplinary care team.  Continue to assess.  Will complete OT assessment with Harvey in the next couple of treatment days.    Louise Garcia, OTR/L       normal

## 2021-12-26 ENCOUNTER — HEALTH MAINTENANCE LETTER (OUTPATIENT)
Age: 56
End: 2021-12-26

## 2022-04-26 NOTE — PROGRESS NOTES
"Ruth is a 56 year old  adult who presents for annual exam.     Besides routine health maintenance, Harvey Amador has no other health concerns today .  HPI:  Here for breast/pelvic exam. Has PCP with [facility].   Past year has been challenging, got a divorce after 29 years of marriage. This has been difficult. She has a therapist and has been taking Zoloft 50 mg which she says is working really well for her.   She has a new partner, they are monogamous. She declines STI testing.   She is very active, exercises at least 5 days a week, she is a former professional . She also plays weekly softball.   She had COVID about a year ago, feels she has recovered well for the most part but does note on ongoing intermittent cough. She is also a darling and feels like she \"can't hold a note like she used to\" since getting COVID.   Only concerns today are external hemorrhoids (has them on and off), she would like a referral to colorectal referral. She also has issues with stress incontinence and would like a PT referral.   Got a mammogram today, is up to date on her pap. Has never had colonoscopy, states \"I'm not going to do that.\" She would consider doing an at home stool sample test.   She plans to follow-up with her [facility] PCP for all labs (CBC, CMP, fasting lipids, TSH, etc.)  Menses are: none, postmenopausal  Patient's last menstrual period was 10/15/2018..   Using vasectomy for contraception.    She is not currently considering pregnancy.    REPRODUCTIVE/GYNECOLOGIC HISTORY:  Ruth is sexually active with male partner(s) and is currently in monogamous relationship.   STI testing offered?  Declined  History of abnormal Pap smear:  No  SOCIAL HISTORY  Abuse: does not report having previously been physical or sexually abused.    Do you feel safe in your environment? YES      She  reports that Harvey Amador has never smoked. Harvey Amador has never used smokeless tobacco.      Last PHQ-9 score on record = "   PHQ-9 SCORE 2022   PHQ-9 Total Score MyChart -   PHQ-9 Total Score 9   Some encounter information is confidential and restricted. Go to Review Flowsheets activity to see all data.     Last GAD7 score on record =   LANIE-7 SCORE 2022   Total Score -   Total Score 11   Some encounter information is confidential and restricted. Go to Review Flowsheets activity to see all data.     Alcohol Score = 1    HEALTH MAINTENANCE:  Cholesterol:   Recent Labs   Lab Test 19  1459   CHOL 222*   HDL 82   *   TRIG 94      History of abnormal lipids: No  Mammo: 3/30/2016 . History of abnormal Mammo: No, doing mammo today.  Regular Self Breast Exams: No  TSH: (  TSH   Date Value Ref Range Status   2020 1.83 0.40 - 4.00 mU/L Final      Pap; (  Lab Results   Component Value Date    PAP NIL 2019    )  Immunizations up to date: yes  (Gardasil, Tdap, Flu)  Health maintenance updated:  yes    HEALTHY HABITS  Eating habits: follows a balanced nutrition diet  Calcium/Vitamin D intake: source:  dairy Adequate? yes  Exercise: How often do you exercise? 5-7 times/week;Walking, Cardio and Strength Training        HISTORY:  OB History    Para Term  AB Living   1 0 0 0 1 0   SAB IAB Ectopic Multiple Live Births   0 1 0 0 0      # Outcome Date GA Lbr Tad/2nd Weight Sex Delivery Anes PTL Lv   1 IAB 1985     IAB        Past Medical History:   Diagnosis Date     Arthritis      Asthma      Bipolar 1 disorder (H)      Chlamydia      H/O cold sores      Thyroid disorder      Traumatic brain injury (H)      Past Surgical History:   Procedure Laterality Date     ------------OTHER-------------  2017    clavicle shattered     STRABISMUS SURGERY  2012     TONSILLECTOMY      as a child     Family History   Problem Relation Age of Onset     Breast Cancer Maternal Grandmother      Breast Cancer Maternal Aunt      No Known Problems Mother      No Known Problems Father      No Known Problems Sister      No Known  "Problems Brother      No Known Problems Maternal Grandfather      No Known Problems Paternal Grandmother      No Known Problems Other      Social History     Socioeconomic History     Marital status: Legally    Occupational History     Employer: [employer]   Tobacco Use     Smoking status: Never Smoker     Smokeless tobacco: Never Used   Substance and Sexual Activity     Alcohol use: Yes     Alcohol/week: 1.0 standard drink     Types: 1 Glasses of wine per week     Drug use: Yes     Comment: edibles     Sexual activity: Yes     Partners: Male     Birth control/protection: Male Surgical     Comment: vasectomy       Current Outpatient Medications:      Multiple Vitamin (MULTI-VITAMIN DAILY PO), , Disp: , Rfl:      sertraline (ZOLOFT) 25 MG tablet, Take 1 tablet (25 mg) by mouth daily (Patient taking differently: Take 50 mg by mouth daily), Disp: 30 tablet, Rfl: 0     ibuprofen (ADVIL/MOTRIN) 200 MG tablet, Take 400 mg by mouth every 4 hours as needed for mild pain, Disp: , Rfl:      NONFORMULARY, THC Honey, once every two weeks, Disp: , Rfl:      Allergies   Allergen Reactions     Hydrocodone Nausea and Vomiting     Morphine Nausea and Vomiting     Tramadol Hives       Past medical, surgical, social and family history were reviewed and updated in EPIC.    ROS:   12 point review of systems negative other than symptoms noted below or in the HPI.    PHYSICAL EXAM:  /70   Pulse 74   Ht 1.6 m (5' 3\")   Wt 60.3 kg (133 lb)   LMP 10/15/2018   BMI 23.56 kg/m     BMI: Body mass index is 23.56 kg/m .  Constitutional: healthy, alert and no distress  Neck: symmetrical, thyroid normal size, no masses present, no lymphadenopathy present.   Cardiovascular: RRR, no murmurs present  Respiratory: breathing unlabored, expiratory wheeze auscultated bilaterally in lower lungs, remainder of lung fields CTA   Breast:normal without masses, tenderness or nipple discharge and no palpable axillary masses or " adenopathy  Gastrointestinal: abdomen soft, non-tender, bowel sounds present  PELVIC EXAM:  Cervix:smooth, neg CMT   Uterus: Normal size, non-tender, mobile  Ovaries: No masses palpated  Rectal exam: deferred    ASSESSMENT/PLAN:    ICD-10-CM    1. Well woman exam  Z01.419    2. Encounter for gynecological examination without abnormal finding  Z01.419    3. External hemorrhoids  K64.4 Colorectal Surgery Referral   4. Stress incontinence in female  N39.3 Physical Therapy Referral   5. MDD (major depressive disorder), recurrent severe, without psychosis (H)  F33.2    6. History of COVID-19  Z86.16      No results found for any visits on 04/27/22.      COUNSELING:   Reviewed preventive health counseling, as reflected in patient instructions       Regular exercise       Healthy diet/nutrition       Safe sex practices/STD prevention       Colorectal Cancer Screening   reports that Harvey Amador has never smoked. Harvey Amador has never used smokeless tobacco.      -Plan to follow-up with PCP for screening labs and physical exam. Discussed exam findings of expiratory wheezes in the lower lung fields bilaterally and recommend she have further evaluation with PCP.   -Discussed importance and recommendations for colon cancer screening given her age. I recommended she discuss further with her PCP regarding all screening options.   -Pelvic floor PT referral placed  -Colorectal referral placed for hemorrhoids   -Annual mammography  -Next pap due in 2024  -RTC in 1 year     LYLE Hardy, SARIAH

## 2022-04-27 ENCOUNTER — OFFICE VISIT (OUTPATIENT)
Dept: MIDWIFE SERVICES | Facility: CLINIC | Age: 57
End: 2022-04-27
Attending: OBSTETRICS & GYNECOLOGY
Payer: COMMERCIAL

## 2022-04-27 ENCOUNTER — ANCILLARY PROCEDURE (OUTPATIENT)
Dept: MAMMOGRAPHY | Facility: CLINIC | Age: 57
End: 2022-04-27
Attending: OBSTETRICS & GYNECOLOGY
Payer: COMMERCIAL

## 2022-04-27 VITALS
DIASTOLIC BLOOD PRESSURE: 70 MMHG | HEART RATE: 74 BPM | SYSTOLIC BLOOD PRESSURE: 120 MMHG | BODY MASS INDEX: 23.57 KG/M2 | HEIGHT: 63 IN | WEIGHT: 133 LBS

## 2022-04-27 DIAGNOSIS — N39.3 STRESS INCONTINENCE IN FEMALE: ICD-10-CM

## 2022-04-27 DIAGNOSIS — Z01.419 ENCOUNTER FOR GYNECOLOGICAL EXAMINATION WITHOUT ABNORMAL FINDING: ICD-10-CM

## 2022-04-27 DIAGNOSIS — K64.4 EXTERNAL HEMORRHOIDS: ICD-10-CM

## 2022-04-27 DIAGNOSIS — Z86.16 HISTORY OF COVID-19: ICD-10-CM

## 2022-04-27 DIAGNOSIS — Z12.31 VISIT FOR SCREENING MAMMOGRAM: ICD-10-CM

## 2022-04-27 DIAGNOSIS — F33.2 MDD (MAJOR DEPRESSIVE DISORDER), RECURRENT SEVERE, WITHOUT PSYCHOSIS (H): ICD-10-CM

## 2022-04-27 DIAGNOSIS — Z01.419 WELL WOMAN EXAM: Primary | ICD-10-CM

## 2022-04-27 PROBLEM — F43.10 PTSD (POST-TRAUMATIC STRESS DISORDER): Status: ACTIVE | Noted: 2021-03-08

## 2022-04-27 PROBLEM — F41.9 ANXIETY: Status: ACTIVE | Noted: 2021-03-08

## 2022-04-27 PROCEDURE — 99396 PREV VISIT EST AGE 40-64: CPT | Performed by: ADVANCED PRACTICE MIDWIFE

## 2022-04-27 PROCEDURE — 77067 SCR MAMMO BI INCL CAD: CPT | Mod: TC | Performed by: RADIOLOGY

## 2022-04-27 ASSESSMENT — ANXIETY QUESTIONNAIRES
6. BECOMING EASILY ANNOYED OR IRRITABLE: MORE THAN HALF THE DAYS
2. NOT BEING ABLE TO STOP OR CONTROL WORRYING: SEVERAL DAYS
5. BEING SO RESTLESS THAT IT IS HARD TO SIT STILL: MORE THAN HALF THE DAYS
7. FEELING AFRAID AS IF SOMETHING AWFUL MIGHT HAPPEN: MORE THAN HALF THE DAYS
3. WORRYING TOO MUCH ABOUT DIFFERENT THINGS: SEVERAL DAYS
1. FEELING NERVOUS, ANXIOUS, OR ON EDGE: MORE THAN HALF THE DAYS
IF YOU CHECKED OFF ANY PROBLEMS ON THIS QUESTIONNAIRE, HOW DIFFICULT HAVE THESE PROBLEMS MADE IT FOR YOU TO DO YOUR WORK, TAKE CARE OF THINGS AT HOME, OR GET ALONG WITH OTHER PEOPLE: SOMEWHAT DIFFICULT
GAD7 TOTAL SCORE: 11

## 2022-04-27 ASSESSMENT — PATIENT HEALTH QUESTIONNAIRE - PHQ9
SUM OF ALL RESPONSES TO PHQ QUESTIONS 1-9: 9
5. POOR APPETITE OR OVEREATING: SEVERAL DAYS

## 2022-04-27 NOTE — PATIENT INSTRUCTIONS
Preventive Health Recommendations  Female Ages over age 50      Yearly exam:     See your health care provider every year in order to:    1.Review health changes.  2. Discuss preventive care.  3. Review your medicines if your provider has prescribed any    Get a Pap test every five years with co-testing for HPV (unless you have an abnormal result and your provider advises testing more often)     You do not need a Pap test if your uterus was removed (hysterectomy) and you have not had cancer    You should be tested each year for STIs (sexually transmitted diseases) if you are at risk  Have a mammogram every year   Have a colonoscopy at age 50, or have a yearly FIT test (stool test). These exams screen for colon cancer.  Screening typically occurs every 10 years or more often if you have a family history or significant risk factors  Have a cholesterol test every 3-5 years, or more often if advised  Have a diabetes test (fasting glucose) every three years. If you are at risk for diabetes, you should have this test more often     Screening for thyroid disease and vitamin D deficiency are also beneficial every 3-5 years and as needed    If you are at risk for osteoporosis (brittle bone disease), think about having a bone density scan (DEXA)    You may experience perimenopausal symptoms such as menstrual changes, hot flashes, night sweats, irritability, mood changes, vaginal dryness, and weight gain. Symptoms can be managed with lifestyle changes and/or medications for hormone replacement therapy (HRT). Talk with your provider about HRT if you are interested. You are considered menopausal after one year without having a menstrual cycle.        Shots:     Get a flu shot each year. Get a tetanus shot every 10 years.          Nutrition:     Eat at least 5 servings of fruits and vegetables each day    Eat REAL food, stay away from processed food    Eat whole-grain bread, whole-wheat pasta and brown rice instead of white  grains and rice    For bone health:  Eat calcium-rich foods or take calcium pills up to 1200 mg. Also take vitamin D (2000 IUs) each day.     Lifestyle    Exercise at least 30 minutes a day, 5 days a week. This will help you control your weight and prevent disease.    Include weight bearing exercise into your exercise routine to help decrease your risk for osteoporosis    Limit alcohol to one drink per day.    No smoking    Wear sunscreen to prevent skin cancer    See your dentist every six months to one year for an exam and cleaning    See your eye doctor every 1 to 2 years

## 2022-04-28 ASSESSMENT — ANXIETY QUESTIONNAIRES: GAD7 TOTAL SCORE: 11

## 2022-05-31 ENCOUNTER — APPOINTMENT (OUTPATIENT)
Dept: CT IMAGING | Facility: CLINIC | Age: 57
End: 2022-05-31
Attending: PHYSICIAN ASSISTANT
Payer: OTHER MISCELLANEOUS

## 2022-05-31 ENCOUNTER — HOSPITAL ENCOUNTER (EMERGENCY)
Facility: CLINIC | Age: 57
Discharge: HOME | End: 2022-05-31
Attending: PHYSICIAN ASSISTANT | Admitting: PHYSICIAN ASSISTANT
Payer: OTHER MISCELLANEOUS

## 2022-05-31 VITALS
RESPIRATION RATE: 18 BRPM | DIASTOLIC BLOOD PRESSURE: 68 MMHG | WEIGHT: 130 LBS | TEMPERATURE: 98.4 F | SYSTOLIC BLOOD PRESSURE: 130 MMHG | BODY MASS INDEX: 23.04 KG/M2 | HEIGHT: 63 IN | HEART RATE: 68 BPM | OXYGEN SATURATION: 98 %

## 2022-05-31 DIAGNOSIS — S09.90XA CLOSED HEAD INJURY, INITIAL ENCOUNTER: ICD-10-CM

## 2022-05-31 DIAGNOSIS — R51.9 HEADACHE: ICD-10-CM

## 2022-05-31 PROCEDURE — 70450 CT HEAD/BRAIN W/O DYE: CPT

## 2022-05-31 PROCEDURE — 99284 EMERGENCY DEPT VISIT MOD MDM: CPT | Mod: 25

## 2022-05-31 PROCEDURE — 250N000013 HC RX MED GY IP 250 OP 250 PS 637: Performed by: PHYSICIAN ASSISTANT

## 2022-05-31 RX ORDER — ACETAMINOPHEN 325 MG/1
975 TABLET ORAL ONCE
Status: COMPLETED | OUTPATIENT
Start: 2022-05-31 | End: 2022-05-31

## 2022-05-31 RX ADMIN — Medication 975 MG: at 18:09

## 2022-05-31 ASSESSMENT — ENCOUNTER SYMPTOMS
VOMITING: 0
NAUSEA: 0
NUMBNESS: 0
HEADACHES: 1

## 2022-05-31 NOTE — LETTER
May 31, 2022      To Whom It May Concern:      Ruth Amador was seen in our Emergency Department today, 05/31/22.  I expect Harvey Amador's condition to improve over the next 3 days.  Harvey Amador may return to work when improved.    Sincerely,        Clark Marquez PA-C

## 2022-05-31 NOTE — ED TRIAGE NOTES
Triage Assessment     Row Name 05/31/22 1619       Triage Assessment (Adult)    Airway WDL WDL       Respiratory WDL    Respiratory WDL WDL       Skin Circulation/Temperature WDL    Skin Circulation/Temperature WDL WDL       Cardiac WDL    Cardiac WDL WDL       Peripheral/Neurovascular WDL    Peripheral Neurovascular WDL WDL       Cognitive/Neuro/Behavioral WDL    Cognitive/Neuro/Behavioral WDL X  Struck on head by bedframe when at work today. No raised area or laceration noted. No LOC. History of TBI.    Level of Consciousness alert    Arousal Level opens eyes spontaneously    Orientation oriented x 4    Speech logical;spontaneous;clear    Mood/Behavior calm;cooperative       Dave Coma Scale    Best Eye Response 4-->(E4) spontaneous    Best Motor Response 6-->(M6) obeys commands    Best Verbal Response 5-->(V5) oriented    Dave Coma Scale Score 15
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
## 2022-05-31 NOTE — LETTER
May 31, 2022      To Whom It May Concern:      Ruth Amador was seen in our Emergency Department today, 05/31/22.  I expect Harvey Amador's condition to improve over the next 3 days.  Harvey Amador may return to work/school when improved.    Sincerely,        Candido Hairston RN

## 2022-06-03 ENCOUNTER — OFFICE VISIT (OUTPATIENT)
Dept: PALLIATIVE MEDICINE | Facility: CLINIC | Age: 57
End: 2022-06-03
Payer: OTHER MISCELLANEOUS

## 2022-06-03 ENCOUNTER — MEDICAL CORRESPONDENCE (OUTPATIENT)
Dept: HEALTH INFORMATION MANAGEMENT | Facility: CLINIC | Age: 57
End: 2022-06-03
Payer: COMMERCIAL

## 2022-06-03 VITALS — DIASTOLIC BLOOD PRESSURE: 66 MMHG | SYSTOLIC BLOOD PRESSURE: 119 MMHG | HEART RATE: 70 BPM | OXYGEN SATURATION: 97 %

## 2022-06-03 DIAGNOSIS — S06.0X0A CONCUSSION WITHOUT LOSS OF CONSCIOUSNESS, INITIAL ENCOUNTER: Primary | ICD-10-CM

## 2022-06-03 DIAGNOSIS — R53.83 FATIGUE DUE TO OLD HEAD INJURY: ICD-10-CM

## 2022-06-03 DIAGNOSIS — S09.90XS FATIGUE DUE TO OLD HEAD INJURY: ICD-10-CM

## 2022-06-03 PROCEDURE — 99204 OFFICE O/P NEW MOD 45 MIN: CPT | Performed by: PHYSICAL MEDICINE & REHABILITATION

## 2022-06-03 RX ORDER — SERTRALINE HYDROCHLORIDE 100 MG/1
TABLET, FILM COATED ORAL
COMMUNITY
Start: 2022-01-25 | End: 2022-09-12

## 2022-06-03 ASSESSMENT — PATIENT HEALTH QUESTIONNAIRE - PHQ9
SUM OF ALL RESPONSES TO PHQ QUESTIONS 1-9: 17
5. POOR APPETITE OR OVEREATING: NEARLY EVERY DAY

## 2022-06-03 ASSESSMENT — ANXIETY QUESTIONNAIRES
5. BEING SO RESTLESS THAT IT IS HARD TO SIT STILL: NEARLY EVERY DAY
2. NOT BEING ABLE TO STOP OR CONTROL WORRYING: SEVERAL DAYS
7. FEELING AFRAID AS IF SOMETHING AWFUL MIGHT HAPPEN: MORE THAN HALF THE DAYS
1. FEELING NERVOUS, ANXIOUS, OR ON EDGE: NEARLY EVERY DAY
IF YOU CHECKED OFF ANY PROBLEMS ON THIS QUESTIONNAIRE, HOW DIFFICULT HAVE THESE PROBLEMS MADE IT FOR YOU TO DO YOUR WORK, TAKE CARE OF THINGS AT HOME, OR GET ALONG WITH OTHER PEOPLE: VERY DIFFICULT
GAD7 TOTAL SCORE: 15
3. WORRYING TOO MUCH ABOUT DIFFERENT THINGS: MORE THAN HALF THE DAYS
GAD7 TOTAL SCORE: 15
6. BECOMING EASILY ANNOYED OR IRRITABLE: SEVERAL DAYS
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
## 2022-06-03 NOTE — PROGRESS NOTES
Depression Response    Patient completed the PHQ-9 assessment for depression and scored >9? Yes  Question 9 on the PHQ-9 was positive for suicidality? No  Does patient have current mental health provider? No    Is this a virtual visit? No    I personally notified the following: visit provider
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
## 2022-06-03 NOTE — NURSING NOTE
Chief Complaint   Patient presents with     Head Injury     Concussion w/o LOC 5/31/22 - Head was stuck by metal bed frame in the back of head       There were no vitals filed for this visit.    There is no height or weight on file to calculate BMI.      WOUND EVALUATION:

## 2022-06-03 NOTE — PATIENT INSTRUCTIONS
"GENERAL ADVICE:  ~ Gradually ease back into your usual activities.   ~ Rest as needed to help your symptoms go away.  - Consider pairing 50 minutes of activity with 10 minutes of rest.  ~ Allow yourself more time for activities.  ~ Write things down.  At home, at work, whenever there is something that you should remember, even it is simple.  SCREENS:  ~ Change settings on your phone and computer using the \"Blue Light Filter\" (Night Shift on all  [employer] products)  ~ The goal is making screens more yellow and less blue.    ~ If this is not an option you can download this program, [employer], to adjust your screen resolution.  ~ [employer]QU[employer] for various filter and font apps  ~ Turn screen brightness down  ~ Increase font size  ~ Limit screen activities including computer, TV and phones.  NECK PAIN:  ~ Ice or Heat are good~  ~ Massage is ok if it doesn't trigger more symptoms~  ~ Gentle stretches and range-of-motion are helpful.  DIZZINESS:  ~ No driving when dizzy.  ~ No biking, climbing heights or ladders if dizzy.  FATIGUE:  ~ Daily exercise is strongly encouraged.  Start with a 10 min walk and increase the time as tolerated until you are walking 30 minutes per day.    ~ Focus on Good sleep hygiene instead of napping . Your goal should be 8 hours of sleep every night.  ~ Try Melatonin 1 hour before bed  ANXIETY OR MOOD SWINGS:  ~ If you are irritable or anxious, take a break in a quiet room.  ~ Try using the free Calm lillian for guided breathing and mindfulness/meditation.  ~ Explore Efficient Frontier (https://www.headspace.com) for free and easy-to-use meditation guidance.  LIGHT SENSITIVITIES:  ~ Avoid florescent lighting when possible.  ~Yellow or balta tinted lenses may help reduce computer or night-time road glare.             ~ Amazon has a $10.00 option: [employer] yellow Night Vision.  NOISE SENSITIVITIES:  ~ Try listening to calming sounds such as the \"Calm Lillian\" to help shift your focus off of more irritating " sounds.  ~ Avoid crowded areas at first then slowly introduce yourself to small increments of crowded, noisy areas.  ~ Try High fidelity earplugs used by [employer]. Etymotic ETY-Plugs, can be found on Amazon for $13.00.  DIET:  - In principle incorporate more protein, lots of veggies, some fruit, whole grains.    - Less sweets and saturated fat.   - Mediterranean Diet is an easy-to-follow example.  ~ Drink plenty of water throughout the day (8-10 glasses per day)    [facility]   Phone # [phone]  Fax # [phone]  Scheduling; # [phone]      Thank you for allowing us to be a part of your care.

## 2022-06-08 ENCOUNTER — TELEPHONE (OUTPATIENT)
Dept: PHYSICAL MEDICINE AND REHAB | Facility: CLINIC | Age: 57
End: 2022-06-08
Payer: COMMERCIAL

## 2022-06-08 ENCOUNTER — HOSPITAL ENCOUNTER (OUTPATIENT)
Dept: PHYSICAL THERAPY | Facility: CLINIC | Age: 57
Setting detail: THERAPIES SERIES
Discharge: HOME | End: 2022-06-08
Attending: PHYSICAL MEDICINE & REHABILITATION
Payer: OTHER MISCELLANEOUS

## 2022-06-08 PROCEDURE — 97110 THERAPEUTIC EXERCISES: CPT | Mod: GP | Performed by: PHYSICAL THERAPIST

## 2022-06-08 PROCEDURE — 97161 PT EVAL LOW COMPLEX 20 MIN: CPT | Mod: GP | Performed by: PHYSICAL THERAPIST

## 2022-06-08 NOTE — TELEPHONE ENCOUNTER
[facility] Call Center    Phone Message    May a detailed message be left on voicemail: yes     Reason for Call: Other: Patient's dog ate the letter that Dr. Leo wrote regarding out of work until July. She needs another copy. Can either upload to [employer] or email to [email or web address]. Please give patient a call so she knows when to expect it.     Action Taken: Message routed to:  [facility] ([facility] physical medicine and rehab    Travel Screening: Not Applicable

## 2022-06-13 ENCOUNTER — HOSPITAL ENCOUNTER (OUTPATIENT)
Dept: PHYSICAL THERAPY | Facility: CLINIC | Age: 57
Setting detail: THERAPIES SERIES
Discharge: HOME | End: 2022-06-13
Attending: PHYSICAL MEDICINE & REHABILITATION
Payer: OTHER MISCELLANEOUS

## 2022-06-13 PROCEDURE — 97110 THERAPEUTIC EXERCISES: CPT | Mod: GP | Performed by: PHYSICAL THERAPIST

## 2022-06-16 ENCOUNTER — HOSPITAL ENCOUNTER (OUTPATIENT)
Dept: OCCUPATIONAL THERAPY | Facility: CLINIC | Age: 57
Setting detail: THERAPIES SERIES
Discharge: HOME | End: 2022-06-16
Attending: PHYSICAL MEDICINE & REHABILITATION
Payer: OTHER MISCELLANEOUS

## 2022-06-16 PROCEDURE — 97166 OT EVAL MOD COMPLEX 45 MIN: CPT | Mod: GO

## 2022-06-16 PROCEDURE — 97535 SELF CARE MNGMENT TRAINING: CPT | Mod: GO
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
## 2022-06-20 ENCOUNTER — TELEPHONE (OUTPATIENT)
Dept: PHYSICAL MEDICINE AND REHAB | Facility: CLINIC | Age: 57
End: 2022-06-20
Payer: COMMERCIAL

## 2022-06-20 NOTE — TELEPHONE ENCOUNTER
[facility] Call Center    Phone Message    May a detailed message be left on voicemail: yes     Reason for Call: Other: pt calling requesting an update on absence and workers comp forms. Please call back when available.    Action Taken: Other: PMR    Travel Screening: Not Applicable

## 2022-06-21 ENCOUNTER — HOSPITAL ENCOUNTER (OUTPATIENT)
Dept: PHYSICAL THERAPY | Facility: CLINIC | Age: 57
Setting detail: THERAPIES SERIES
Discharge: HOME | End: 2022-06-21
Attending: PHYSICAL MEDICINE & REHABILITATION
Payer: OTHER MISCELLANEOUS

## 2022-06-21 PROCEDURE — 97112 NEUROMUSCULAR REEDUCATION: CPT | Mod: GP | Performed by: PHYSICAL THERAPIST

## 2022-06-21 NOTE — TELEPHONE ENCOUNTER
Returned a call to pt, had to leave message.  Looks like the letter was emailed to her back on 6-9-22 as far as work comp forms, I don't see that we received it. Please have the company send it to us.    Thanks,   Jamaal

## 2022-06-23 ENCOUNTER — HOSPITAL ENCOUNTER (OUTPATIENT)
Dept: OCCUPATIONAL THERAPY | Facility: CLINIC | Age: 57
Setting detail: THERAPIES SERIES
Discharge: HOME | End: 2022-06-23
Attending: PHYSICAL MEDICINE & REHABILITATION
Payer: OTHER MISCELLANEOUS

## 2022-06-23 PROCEDURE — 97535 SELF CARE MNGMENT TRAINING: CPT | Mod: GO

## 2022-06-27 ENCOUNTER — TELEPHONE (OUTPATIENT)
Dept: PHYSICAL MEDICINE AND REHAB | Facility: CLINIC | Age: 57
End: 2022-06-27
Payer: COMMERCIAL

## 2022-06-27 DIAGNOSIS — S09.90XS FATIGUE DUE TO OLD HEAD TRAUMA: ICD-10-CM

## 2022-06-27 DIAGNOSIS — S06.0X0A CONCUSSION WITHOUT LOSS OF CONSCIOUSNESS: ICD-10-CM

## 2022-06-27 DIAGNOSIS — R53.83 FATIGUE DUE TO OLD HEAD TRAUMA: ICD-10-CM

## 2022-06-27 DIAGNOSIS — R68.89 LIGHT SENSITIVITY: Primary | ICD-10-CM
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
## 2022-06-27 NOTE — TELEPHONE ENCOUNTER
----- Message from George Leo DO sent at 6/23/2022  5:46 PM CDT -----  Regarding: FW: Can you give Harvey a call?  [facility],    Usually Isabelle would help with this?    Clay morenoSherice  ----- Message -----  From: Babita Swain OTR  Sent: 6/23/2022   4:41 PM CDT  To: Liliana Walker PT, Winter Dietz, #  Subject: RE: Can you give Harvey a call?                  Hi team,  I wanted to follow up on Liliana's message regarding Harvey's care. She was in to see me this afternoon (6/23/22) for her first follow-up OT visit, and much like Liliana's experience with her the other day, she arrived in tears and expressed significant physical, emotional, and mental exhaustion with attempting to coordinate her care and her daily activities. I provided her with the concussion  contact information, and I anticipate she will be reaching out already either today or early tomorrow.    Per Harvey, her insurance is requesting all recommendations be submitted in writing from her physician to be considered for insurance coverage. At this time, I am recommending two styles of tinted glasses - medium boysenberry cocoon style for indoors/cloudy days and dark grey green (Noir medical filter 02) wraparound sunglasses for rochelle days or with increased symptoms. I also recommended using alternative transportation options at this time (e.g. [employer], medical transportation) and provided her with several resources that offer ride services. Lastly, she is feeling significantly overwhelmed by housework at this time and would benefit from a few hours of assistance in her home with cleaning/household tasks if any resources are available to her through [facility] or social work services.    Let me know if there is any additional information I can provide at this time regarding my recommendations or Harvey's care in general.    Thank you!  Babita Swain, OTR/L    ----- Message -----  From: Liliana Walker PT  Sent: 6/22/2022   3:52  PM CDT  To: Babita Ortiz, OTR, #  Subject: Can you give Harvey a call?                      Lorenzo Max!    I am Liliana, Harvey's PT, referred to me by Dr Leo to address her neck pain following concussion. I have cc'd Babita, Harvey's OT, referred for light sensitivity and Dr Leo to this message for reference.     I saw Harvey for the 3rd time yesterday and Babita is seeing her for her tomorrow for her first OT visit following the eval.     Harvey came to me in tears yesterday, much more emotionally distressed than her prior to visits with me. She has been communication with workers comp and they need supportive documentation for the tinted eyeware Babita recommended. She also feels very overwhelmed - expresses emotional and cognitive confusion communicating with insurance/making appointments/driving/household chores. She is looking for additional support to help navigate everything.    My impression is that from her prior TBI she felt 'almost fully recovered' but with continued emotional distress/depression - has a mental health therapist she sees. She also demonstrates to me a strong motivation to improve despite the setback in her most recent concussion.    We participated in breathing/yoga techniques and she was significantly reassured at the end of our session, and appreciated my communication to the team.    She has an incorrect number to call you - the information below I provided to Babita who plans on giving to Harvey tomorrow.    My request - Sawyer, can you giver her a call? She will otherwise be giving you a call with the phone number provided by Babita tomorrow.    Concussion   Name: Winter Dietz   Email: [email or web address]  Phone: [phone]  Hours: 8:30am-5:00pm  Contact for: Patient needing to be scheduled with a concussion provider, or questions about a concussion referral placed  Best way to contact: For small things, use [employer] Teams. For longer  messages, use Epic inbasket.      Thank you team!     Warmly,    Liliana Walker PT, DPT

## 2022-06-28 NOTE — TELEPHONE ENCOUNTER
[facility] Call Center    Phone Message    May a detailed message be left on voicemail: yes     Reason for Call: Other: pt calling to speak to Chela regarding worker's comp. She states [employer] [place] told her they haven't received any information from so she isn't able to receive workers' comp. Please call back to advise.    Action Taken: Other: PMR    Travel Screening: Not Applicable

## 2022-06-29 NOTE — TELEPHONE ENCOUNTER
Writer spoke to patient and let her know that Chela is working with her insurance company and will be faxing paperwork this afternoon.     Patient also asked if she could change her appt to an in person appt. Patient was rescheduled for inperson on 09/09 and added to the wait list for possible sooner appt.

## 2022-06-29 NOTE — TELEPHONE ENCOUNTER
Input from OT specific to glasses needed:    1) medium black boysenberry cocoon style for indoors/cloudy days   https://[employer]/shop/cocoons/fitovers/cocoons-low-vision-uv-filters/cocoons-low-vision-slim-line-m-black-frameboysenberry-uv-filters/     And 2) dark grey green (Noir medical filter 02) wraparound sunglasses for rochelle days or with increased symptoms   https://[email or web address][email or web address].html     Entered the 2 pair glasses on DME Rx for DR Leo to co-sign.    Letter completed to [employer] Johnson, records faxed to Maura johnson.  Chela Lovett RN on 6/29/2022 at 7:07 PM

## 2022-06-29 NOTE — TELEPHONE ENCOUNTER
I have the packet of records ready to be faxed to [facility] and am working on a letter to ask for the items her OT recommended (special glasses).      I am waiting for more information from OT about where to buy the glasses so [facility] has what they need to authorize.    I will fax this afternoon. ( I was out ill yesterday, started this on Monday)    Please ask Harvey if she has returned any and all forms to [facility] that they requested from her so they will have the GEOVANNA to get records directly from HIM going forward - they noted to me that they had not received this.    Pt needs to keep her claim number available as well.    Thanks!  Chela Lovett, RN on 6/29/2022 at 2:29 PM

## 2022-07-01 ENCOUNTER — HOSPITAL ENCOUNTER (OUTPATIENT)
Dept: OCCUPATIONAL THERAPY | Facility: CLINIC | Age: 57
Setting detail: THERAPIES SERIES
Discharge: HOME | End: 2022-07-01
Attending: PHYSICAL MEDICINE & REHABILITATION
Payer: OTHER MISCELLANEOUS

## 2022-07-01 PROCEDURE — 97535 SELF CARE MNGMENT TRAINING: CPT | Mod: GO

## 2022-07-05 ENCOUNTER — TELEPHONE (OUTPATIENT)
Dept: PHYSICAL MEDICINE AND REHAB | Facility: CLINIC | Age: 57
End: 2022-07-05

## 2022-07-05 ENCOUNTER — HOSPITAL ENCOUNTER (OUTPATIENT)
Dept: PHYSICAL THERAPY | Facility: CLINIC | Age: 57
Setting detail: THERAPIES SERIES
Discharge: HOME | End: 2022-07-05
Attending: PHYSICAL MEDICINE & REHABILITATION
Payer: OTHER MISCELLANEOUS

## 2022-07-05 PROCEDURE — 97112 NEUROMUSCULAR REEDUCATION: CPT | Mod: GP | Performed by: PHYSICAL THERAPIST

## 2022-07-05 NOTE — LETTER
Date: 2022    To: Livia HEDRICK  [facility] [employer] /  [employer] [facility].  Phone: [phone]  Fax: [phone]    Re: CLAIM # [id number]    LUTHER OSEI of [address] [facility] [address] / : 61    Enclosed records:     ED visit 22     Physical Medicine & Rehab initial consultation 6/3/22     Workability Letter of 6/3/22     Physical Therapy Evaluation and visits 22, 22, 22     Occupational Therapy Evaluation and visits 22, 3/23/22     Prescription for glasses recommended by Occupational Therapy (dated 22)     Future appointments currently booked        Dear Ms. Livia HEDRICK,    Please refer to the enclosed medical records for your client to support your request for Medical Information.  Disability and Leave provider Statement    Please provide your GEOVANNA signed by patient for our records.     Best regards,    Chela Lovett RN, CRRN  Patient Care Coordinator  Department of Physical Medicine and Rehabilitation  [facility]  [phone]

## 2022-07-05 NOTE — TELEPHONE ENCOUNTER
Faxed records to [facility] [address] [facility] [place] [employer]  Attn Livia CHICAS    Fax: [phone]  Phone: [phone]    Claim # [id number]

## 2022-07-13 ENCOUNTER — HOSPITAL ENCOUNTER (OUTPATIENT)
Dept: OCCUPATIONAL THERAPY | Facility: CLINIC | Age: 57
Setting detail: THERAPIES SERIES
Discharge: HOME | End: 2022-07-13
Attending: PHYSICAL MEDICINE & REHABILITATION
Payer: OTHER MISCELLANEOUS

## 2022-07-13 PROCEDURE — 97535 SELF CARE MNGMENT TRAINING: CPT | Mod: GO

## 2022-07-14 ENCOUNTER — TELEPHONE (OUTPATIENT)
Dept: PHYSICAL MEDICINE AND REHAB | Facility: CLINIC | Age: 57
End: 2022-07-14

## 2022-07-14 NOTE — TELEPHONE ENCOUNTER
1. Letter is done by Dr Leo. Pt can view/ download from [email or web address].  2. Called to Harvey to get [place] phone #   --Left a message including this and all questions listed above & below.    3. Need to know which insurance needs letters and/or RTW info (?employer fax)    There are both [place] and [place] [employer] claims - want to make sure we have GEOVANNA on file for both claims.    Chela Lovett RN on 7/14/2022 at 11:05 AM

## 2022-07-14 NOTE — TELEPHONE ENCOUNTER
Devika Saucedo [facility] returned my call    Her fax is [phone]    She was assigned to Harvey earlier this week    Today's new workability letter faxed to Devika now.    Writer will send additional info that was sent to Maura Caldwell as well as Otilio.  It sounds as though pt may have tried to access STD through [employer], but that is not appropriate as this is a work comp claim.    Chela Lovett RN on 7/14/2022 at 1:37 PM

## 2022-07-14 NOTE — TELEPHONE ENCOUNTER
Note from Babita OT:  [facility]'s name is Devika and the number I was provided is [phone].     Writer called and left message ?where to sent letter as well as the questions listed in message to pt below.    Chela Lovett RN on 7/14/2022 at 11:56 AM

## 2022-07-14 NOTE — TELEPHONE ENCOUNTER
----- Message from George Leo DO sent at 7/14/2022  9:16 AM CDT -----  Regarding: RE: Workability Update  Letter updated and in her chart.      Not sure about QRC portion, usually Isabelle would help converse with QRC.    Clay morenoSherice   ----- Message -----  From: Babita Swain, OTR  Sent: 7/13/2022  10:45 AM CDT  To: George Leo DO  Subject: Workability Update                               Dr. Leo,  I have had the pleasure of working with one of your patients, Harvey Amador, in outpatient OT following her recent concussion at the end of May. You saw Harvey on 6/3/22, and per the pt, she was provided the recommendation that she could return to work by 7/14/22. At this time, she continues to have significant difficulty managing her concussion symptoms and completing her basic ADLs each day. Today (7/13/22), she marked 12 of 15 symptoms with a total severity of 43 on the Concussion Symptom Assessment. She continues to be significantly limited by light sensitivity/vision changes, cognitive changes, and fatigue. She has expressed difficulty managing her appointments (she's had to reduce her PT appointments as a result), driving, running short errands, and caring for her home. Per my observations in session and pt's self-report of symptoms/challenges, she is not physically or cognitively ready for return to work at this time. Due to her history of past head injury, I anticipate she will require additional time for recovery from her current concussion and may benefit from additional 1-2 months off from work to provide the time necessary for this recovery, with anticipation of re-assessment around September for workability.    Per these updates, can you please provide the patient with a new workability recommendation? She will require this for her work comp records. Her QRC, Devika, may also be calling for confirmation of this recommendation.    Please let me know if you need any additional information from  me at this time or having any questions/concerns regarding Harvey's care in general.    Thank you,  Babita Swain, OTR/L

## 2022-07-18 NOTE — TELEPHONE ENCOUNTER
Faxed (4) letters and records to [facility] Devika, included same info as sent to Maura Caldwell and Otilio   .    Future records request need to go through HIM office - send records requests to fax # [phone]    ([facility] to facilitate that GEOVANNA in place)             Chela Lovett RN on 7/18/2022 at 12:19 PM

## 2022-07-21 ENCOUNTER — HOSPITAL ENCOUNTER (OUTPATIENT)
Dept: OCCUPATIONAL THERAPY | Facility: CLINIC | Age: 57
Setting detail: THERAPIES SERIES
Discharge: HOME | End: 2022-07-21
Attending: PHYSICAL MEDICINE & REHABILITATION
Payer: OTHER MISCELLANEOUS

## 2022-07-21 PROCEDURE — 97535 SELF CARE MNGMENT TRAINING: CPT | Mod: GO | Performed by: OCCUPATIONAL THERAPIST

## 2022-07-26 ENCOUNTER — TELEPHONE (OUTPATIENT)
Dept: PHYSICAL MEDICINE AND REHAB | Facility: CLINIC | Age: 57
End: 2022-07-26

## 2022-07-26 DIAGNOSIS — S06.0X0A CONCUSSION WITHOUT LOSS OF CONSCIOUSNESS, INITIAL ENCOUNTER: Primary | ICD-10-CM

## 2022-07-26 DIAGNOSIS — R68.89 LIGHT SENSITIVITY: ICD-10-CM

## 2022-07-26 NOTE — TELEPHONE ENCOUNTER
We use Dr. Gunn - neuro-optometry it is Concussion .  They see patient first than send to Neuro-opthalmology if needed.  General

## 2022-07-26 NOTE — TELEPHONE ENCOUNTER
OT recommending neuro optometry evaluation  [facility] will obtain prior auth before scheduling    Sent message to Babita DOUGLAS to ask if there is a certain clinic she is recommending.    Orders sent to DR Leo for signature.    Chela Lovett RN on 7/26/2022 at 10:25 AM

## 2022-07-26 NOTE — TELEPHONE ENCOUNTER
Faxed orders for mental health referral and neruo ophthalmology to Devika Saucedo [facility]    phone: [phone] / fax: [phone]

## 2022-07-27 ENCOUNTER — HOSPITAL ENCOUNTER (OUTPATIENT)
Dept: OCCUPATIONAL THERAPY | Facility: CLINIC | Age: 57
Setting detail: THERAPIES SERIES
Discharge: HOME | End: 2022-07-27
Attending: PHYSICAL MEDICINE & REHABILITATION
Payer: OTHER MISCELLANEOUS

## 2022-07-27 PROCEDURE — 97535 SELF CARE MNGMENT TRAINING: CPT | Mod: GO

## 2022-08-03 ENCOUNTER — HOSPITAL ENCOUNTER (OUTPATIENT)
Dept: OCCUPATIONAL THERAPY | Facility: CLINIC | Age: 57
Setting detail: THERAPIES SERIES
Discharge: HOME | End: 2022-08-03
Attending: PHYSICAL MEDICINE & REHABILITATION
Payer: OTHER MISCELLANEOUS

## 2022-08-03 PROCEDURE — 97535 SELF CARE MNGMENT TRAINING: CPT | Mod: GO

## 2022-08-16 ENCOUNTER — HOSPITAL ENCOUNTER (OUTPATIENT)
Dept: PHYSICAL THERAPY | Facility: CLINIC | Age: 57
Setting detail: THERAPIES SERIES
Discharge: HOME | End: 2022-08-16
Attending: PHYSICAL MEDICINE & REHABILITATION
Payer: OTHER MISCELLANEOUS

## 2022-08-16 PROCEDURE — 97110 THERAPEUTIC EXERCISES: CPT | Mod: GP | Performed by: PHYSICAL THERAPIST

## 2022-08-31 ENCOUNTER — HOSPITAL ENCOUNTER (OUTPATIENT)
Dept: OCCUPATIONAL THERAPY | Facility: CLINIC | Age: 57
Setting detail: THERAPIES SERIES
Discharge: HOME | End: 2022-08-31
Attending: PHYSICAL MEDICINE & REHABILITATION
Payer: OTHER MISCELLANEOUS

## 2022-08-31 PROCEDURE — 97535 SELF CARE MNGMENT TRAINING: CPT | Mod: GO

## 2022-09-07 ENCOUNTER — HOSPITAL ENCOUNTER (OUTPATIENT)
Dept: OCCUPATIONAL THERAPY | Facility: CLINIC | Age: 57
Setting detail: THERAPIES SERIES
Discharge: HOME | End: 2022-09-07
Attending: PHYSICAL MEDICINE & REHABILITATION
Payer: OTHER MISCELLANEOUS

## 2022-09-07 PROCEDURE — 97535 SELF CARE MNGMENT TRAINING: CPT | Mod: GO

## 2022-09-07 NOTE — PROGRESS NOTES
"[facility]    Outpatient Occupational Therapy Progress Note  Patient: Ruth Amador  : 1965    Beginning/End Dates of Reporting Period:  22 to 22    Referring Provider: George Leo,     Therapy Diagnosis: Decreased IND with ADLs/IADLs    Client Self Report: Pt is in tears at start of session today, reports her headache is very bad today, as well as yesterday. Reports \"I barely got here today.\" Reports inability to keep her head up due to pain/tension in her neck and needs to lay down during session today. Reports she has been very emotional and crying a lot the last few days. Also noted she tried lifting a little the other day (about 10 lbs) when transporting rocks and has had significant pain/discomfort in neck and shoulders since. Went to her uncle's farm for about 3 days for stress relief in the past week and notes feeling like she had no, or very little, headache there. Notes that having the option to do things rather than feeling the need to do them at someone else's home was a huge stress relief. Overall reports significant financial stress and stress from household chores/projects \"feel like they are physically weighing me down.\" Is very concerned as her follow up appointment with her doctor was rescheduled from September to November without her knowledge.    Objective Measurements:     Objective Measure: CSA   Details: 14 symptoms, 54 severity (worsened from 12 symptoms, 36 severity previously, see flowsheet for details).     Objective Measure: Visual skills   Details: As of 22 - Smooth pursuits with tracking though expresses her eyes feel \"tired.\" Hooded lid in R eye from strabismus that was previously treated. Saccades intact horizontally though slowed and pt reports it feels \"uncomfortable.\" With NPC, pt reports blurriness in vision around 18-24+ inches from eyes " (BNLs, WNLs is 3-5 inches) and notes this does not improve and only worsens with attempts to get closer (reports very blurry and double vision at 5 inches). Reports past vision therapy from previous head injury and notes significant decline in vision skills from the progress she made in this previous episode of therapy.     Objective Measure: MNRead   Details: As of 7/21/22 - No refraction at first, donned glasses for 3.2M and smaller: using ambient lighting: Smallest print size read: .8 M; Critical Print size: 1M; WPM at critical print size: ~107; MNRead with task light: Smallest print size read: .6M (though c/o double vision at this size, task light was dim and in dim room). Preferred print size: 2M.  Normal reading speed for normal reader: 150-200 wpm out loud.     Objective Measure: CAM   Details: As of 7/27/22 - Initiated higher level cognitive assessment with pt scoring with SEVERE impairment in temporal awareness. MODERATE impairment in forward visual memory/sequencing. MILD impairment in backward visual memory/sequencing. And NO impairment in remote and recent memory/orientation, following directions (yes/no, one-step verbal, written directions - though requires extra processing time for 3-step written), immediate memory (auditory and motor), matching, object identification, and recall/recognition (auditory and motor).     As of 8/3/22 - Continued with higher level CAM assessment with pt scoring with MODERATE impairment with moderate concrete problem solving and safety/judgment. MILD impairment with backward auditory memory/sequencing (5/7) and foresight/planning (7/8). NO impairment with forward auditory memory/sequencing, coin recognition, mental manipulation of money, single digit math skills, multiple digit maths kills, simple concrete problem solving, and mental flexibility.    As of 8/31/22 - Finalized higher level CAM assessment with pt scoring MILD impairment in abstract reasoning (5/6). MODERATE  "impairment in complex concrete problem solving (pt gets 9/9 tasks but 2 out of order).        Goals:     Goal Identifier Symptom Management   Goal Description Pt will identify and use 3 symptom management strategies (computer adaptations, self-awareness and self-management of symptoms, use of adaptive techniques, Safe and Sound protocol, etc.) to promote ADL/IADL completion (e.g. reading, exercising, work, sleep).   Target Date 11/30/22   Date Met      Progress (detail required for progress note):  Goal progressing. Pt educated in [employer] for symptom/fatigue management. Further educated on managing daily life with concussion symptoms including sensory adaptations (see vision goal below), reducing number of trips outside of her home each day, avoiding busy/crowded/loud environments, reducing/avoiding driving as able, reducing/avoiding work until further able to manage symptoms, managing stress, and completing light exercise to promote recovery. Educated in tracking system to promote self-awareness and management of symptoms with pt having intermittent follow-through with log due to difficulty completing when symptoms are exacerbated. Will continue to educate and train pt in appropriate strategies to promote IND with ADLs/IADLs and assist with eventual return to work.     Goal Identifier Fatigue Management   Goal Description Pt will demonstrate 3 energy conservation strategies (e.g. pacing, planning, prioritizing, sleep hygiene, etc.) to facilitate fatigue management with ADL/IADL tasks (e.g. laundry, meal preparation, walking/exercise).   Target Date 11/30/22   Date Met      Progress (detail required for progress note):  Goal progressing. Pt educated in [employer] for symptom/fatigue management, as well as specific energy conservation principles of plan, prioritize, pace, and posture with specific application to ADLs/IADLs. Significant focus on \"plan\" for each day with pt tasked with " "prioritizing 2-3 tasks to complete in her day with focus of reducing her physical and cognitive strain with aim to increase activity as able. Will continue to educate and train pt in appropriate strategies to promote IND with ADLs/IADLs.     Goal Identifier Vision   Goal Description Pt will independently demonstrate 3 compensatory strategies and/or adaptations (electronic adaptations, glare reduction, reducing visual clutter, increasing text size, etc.) for symptom management (eye strain, headache, etc.) and accuracy with visual tasks.   Target Date 11/30/22   Date Met      Progress (detail required for progress note):  Goal progressing. Pt educated in visual compensatory strategies to promote IND with ADLs/IADLs, including reduce glare (wear tinted glasses - pt now has pair for indoor and pair for outdoor), use colored transparencies  which increase contrast and reduce glare (pt issued blue), use full spectrum light bulbs, use a yellow legal pad to avoid sharp lighting changes, avoid night driving (and reduce driving altogether; pt receiving rides to therapy), minimize visual clutter/stay organized; block out \"visual noise\" when reading (green cardstocks/contrasting paper); dim lights if causing eye strain, use goose neck task lighting; adjust computer/iPad/phone screens and use text to speech google extension to reduce effort with reading comprehension issues (provided training and demonstration in phone and computer settings to reduce eye strain - reverse contrast, increase text size, night shift/night light, solid background/reduce visual clutter, screen reader). Additionally, pt was seen by low vision rehab specialist who recommended pt see neuro-ophthalmologist, Use large print whenever possible; Try to wear your current glasses (old Rx) all the time (work up to this), 20/20/20 rule, Use camera on your phone as a magnifier for small print or get magnifier ana. Also trialed binasal occlusion with pt initially " disliking tape then feeling it was helpful. Will continue to address in future sessions, prn.        Goal Identifier Memory   Goal Description Patient will demonstrate improved memory skills by completing short-term memory tasks in occupational therapy with 90% accuracy using compensatory strategies for increased safety and independence with ADL/IADLS (keeping up schedule, remembering to take medications, remember to switch laundry).   Target Date 11/30/22   Date Met      Progress (detail required for progress note):  Goal progressing. Completed CAM assessment - see objective measures above. Pt requiring 3 days to complete testing due to decreased attention and tolerance for cognitive activity. Will plan to further address memory skills for IND with ADLs/IADLS in future sessions, but goal has yet to be further addressed due to complexity of goal and focus on symptom management in first few sessions of OT.     Goal Identifier Problem-Solving/Planning   Goal Description Pt will demonstrate the ability to complete moderate to complex problem-solving/planning tasks (WCPA, Errands, [employer], SET, etc.) with 90% accuracy to complete home and work tasks safely and accurately (e.g. meal preparation, financial management, medication management, driving, work).   Target Date 11/30/22   Date Met      Progress (detail required for progress note):  Goal progressing. Completed CAM assessment - see objective measures above. Pt requiring 3 days to complete testing due to decreased attention and tolerance for cognitive activity. Will plan to further address higher level cognitive skills for IND with ADLs/IADLs in future sessions, but goal has yet to be further addressed due to complexity of goal and focus on symptom management in first few sessions of OT.     Goal Identifier HEP   Goal Description Pt to demonstrate IND completion of HEP, as assigned, with at least 85% consistency to promote IND with ADLs/IADLs.   Target Date  "11/30/22   Date Met      Progress (detail required for progress note):  Goal progressing. Pt demonstrating adherence to recommendations between sessions, attempting to apply symptom and fatigue management strategies to promote participation in ADLs/IADLs. Reports to OTR her success with using strategies with pt noting most beneficial so far being use of light filtering glasses both indoors and outdoors to reduce light sensitivity. Also notes pacing of tasks being helpful but continues to work on refraining from \"pushing\" past her limits. Will plan to further progress HEP with more formal physical and cognitive exercises, as able, pending pt's progress.     Plan:  Continue therapy per current plan of care. Pt has been seen for a total of 9 outpatient OT sessions with focus on the above goal areas (see progress noted above). Pt continues to experience reductions and exacerbations in symptoms and continues to have significant difficulty with participating in her ADLs and IADLs as a result. Pt remains appropriate for OT services 1x/week. Goal dates updated.    Discharge:  No    Babita Swain, OTR/L  "

## 2022-09-09 ENCOUNTER — TELEPHONE (OUTPATIENT)
Dept: PALLIATIVE MEDICINE | Facility: CLINIC | Age: 57
End: 2022-09-09

## 2022-09-09 NOTE — TELEPHONE ENCOUNTER
Called patient to schedule a visit on 9/12/22 at 9:30 AM since a patient dropped off.      Paige York EMT

## 2022-09-12 ENCOUNTER — OFFICE VISIT (OUTPATIENT)
Dept: PALLIATIVE MEDICINE | Facility: CLINIC | Age: 57
End: 2022-09-12
Payer: OTHER MISCELLANEOUS

## 2022-09-12 VITALS — DIASTOLIC BLOOD PRESSURE: 78 MMHG | HEART RATE: 68 BPM | OXYGEN SATURATION: 98 % | SYSTOLIC BLOOD PRESSURE: 121 MMHG

## 2022-09-12 DIAGNOSIS — G24.3 CERVICAL DYSTONIA: ICD-10-CM

## 2022-09-12 DIAGNOSIS — G43.009 MIGRAINE WITHOUT AURA AND WITHOUT STATUS MIGRAINOSUS, NOT INTRACTABLE: ICD-10-CM

## 2022-09-12 DIAGNOSIS — S09.90XS FATIGUE DUE TO OLD HEAD INJURY: ICD-10-CM

## 2022-09-12 DIAGNOSIS — R53.83 FATIGUE DUE TO OLD HEAD INJURY: ICD-10-CM

## 2022-09-12 DIAGNOSIS — F07.81 POST CONCUSSION SYNDROME: ICD-10-CM

## 2022-09-12 DIAGNOSIS — S06.0X9D CONCUSSION WITH LOSS OF CONSCIOUSNESS, SUBSEQUENT ENCOUNTER: Primary | ICD-10-CM

## 2022-09-12 PROCEDURE — 99215 OFFICE O/P EST HI 40 MIN: CPT | Performed by: PHYSICAL MEDICINE & REHABILITATION

## 2022-09-12 RX ORDER — AMITRIPTYLINE HYDROCHLORIDE 10 MG/1
10 TABLET ORAL AT BEDTIME
Qty: 30 TABLET | Refills: 0 | Status: SHIPPED | OUTPATIENT
Start: 2022-09-12 | End: 2022-10-07 | Stop reason: ALTCHOICE

## 2022-09-12 NOTE — LETTER
September 12, 2022      Workability Form for Harvey Amador, 1965 who is under my care for a concussion that occurred on 5/31/22.     Date of most recent exam: 9/12/22  Date of next exam: 12/5/22     Diagnosis:    Concussion without loss of consciousness  S06.0X0D    Harvey Amador is receiving therapies to assist with rehabilitation.    Harvey Amador will need to be off work and maybe able to start again on 12/14/22 pending progress with roecovery and therapy.    Follow up evaluation and revision of recommendations to occur on 12/5/22.    Please feel free to contact me at the number below with any questions or concerns.    Sincerely,       George Leo, DO  Physical Medicine and Rehabilitation and Concussion Clinic  [facility]  Clinic nurse line: [phone]  Fax: [phone]

## 2022-09-12 NOTE — NURSING NOTE
Chief Complaint   Patient presents with     RECHECK     Follow up w/c and patient hit head on 09/09/2022     Mary Chamberlain CMA at 9:54 AM on 9/12/2022.

## 2022-09-12 NOTE — LETTER
September 12, 2022      Workability Form for Harvey Amador, 1965 who is under my care for a concussion that occurred on 5/31/22.     Date of most recent exam: 9/12/22  Date of next exam: 12/12/22     Diagnosis:    Concussion without loss of consciousness  S06.0X0D    Harvey Amador is receiving therapies to assist with rehabilitation.    Harvey Amador will need to be off work and maybe able to start again on 12/14/22 pending progress with roecovery and therapy.    Follow up evaluation and revision of recommendations to occur on 12/12/22.    Please feel free to contact me at the number below with any questions or concerns.    Sincerely,       George Leo, DO  Physical Medicine and Rehabilitation and Concussion Clinic  [facility]  Clinic nurse line: [phone]  Fax: [phone]
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
## 2022-09-14 ENCOUNTER — HOSPITAL ENCOUNTER (OUTPATIENT)
Dept: OCCUPATIONAL THERAPY | Facility: CLINIC | Age: 57
Setting detail: THERAPIES SERIES
Discharge: HOME | End: 2022-09-14
Attending: PHYSICAL MEDICINE & REHABILITATION
Payer: OTHER MISCELLANEOUS

## 2022-09-14 ENCOUNTER — HOSPITAL ENCOUNTER (OUTPATIENT)
Dept: PHYSICAL THERAPY | Facility: CLINIC | Age: 57
Setting detail: THERAPIES SERIES
Discharge: HOME | End: 2022-09-14
Attending: PHYSICAL MEDICINE & REHABILITATION
Payer: OTHER MISCELLANEOUS

## 2022-09-14 PROCEDURE — 97535 SELF CARE MNGMENT TRAINING: CPT | Mod: GO

## 2022-09-14 PROCEDURE — 97140 MANUAL THERAPY 1/> REGIONS: CPT | Mod: GP | Performed by: PHYSICAL THERAPIST

## 2022-09-14 PROCEDURE — 97110 THERAPEUTIC EXERCISES: CPT | Mod: GP | Performed by: PHYSICAL THERAPIST

## 2022-09-22 ENCOUNTER — THERAPY VISIT (OUTPATIENT)
Dept: PHYSICAL THERAPY | Facility: CLINIC | Age: 57
End: 2022-09-22
Payer: OTHER MISCELLANEOUS

## 2022-09-22 DIAGNOSIS — M54.2 NECK PAIN: ICD-10-CM

## 2022-09-22 DIAGNOSIS — G44.209 TENSION HEADACHE: ICD-10-CM

## 2022-09-22 PROCEDURE — 97161 PT EVAL LOW COMPLEX 20 MIN: CPT | Mod: GP | Performed by: PHYSICAL THERAPIST

## 2022-09-22 PROCEDURE — 97140 MANUAL THERAPY 1/> REGIONS: CPT | Mod: GP | Performed by: PHYSICAL THERAPIST
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
## 2022-09-28 DIAGNOSIS — G43.009 MIGRAINE WITHOUT AURA AND WITHOUT STATUS MIGRAINOSUS, NOT INTRACTABLE: ICD-10-CM

## 2022-09-28 DIAGNOSIS — G24.3 CERVICAL DYSTONIA: Primary | ICD-10-CM

## 2022-09-29 ENCOUNTER — OFFICE VISIT (OUTPATIENT)
Dept: OPTOMETRY | Facility: CLINIC | Age: 57
End: 2022-09-29
Payer: OTHER MISCELLANEOUS

## 2022-09-29 DIAGNOSIS — H53.143 PHOTOPHOBIA OF BOTH EYES: ICD-10-CM

## 2022-09-29 DIAGNOSIS — H02.834 DERMATOCHALASIS OF BOTH UPPER EYELIDS: ICD-10-CM

## 2022-09-29 DIAGNOSIS — H52.4 PRESBYOPIA: ICD-10-CM

## 2022-09-29 DIAGNOSIS — F07.81 POSTCONCUSSION SYNDROME: Primary | ICD-10-CM

## 2022-09-29 DIAGNOSIS — H02.831 DERMATOCHALASIS OF BOTH UPPER EYELIDS: ICD-10-CM

## 2022-09-29 DIAGNOSIS — H50.9 STRABISMUS: ICD-10-CM

## 2022-09-29 ASSESSMENT — VISUAL ACUITY
OD_CC+: -2
METHOD: SNELLEN - LINEAR
OS_CC+: -2
OS_CC: 20/25
OD_CC: 20/30
CORRECTION_TYPE: GLASSES

## 2022-09-29 ASSESSMENT — REFRACTION_MANIFEST
OD_AXIS: 115
OD_CYLINDER: +1.50
OS_CYLINDER: +1.00
OD_SPHERE: +0.75
OD_ADD: +2.50
OS_SPHERE: +0.50
OS_AXIS: 085
OS_ADD: +2.50

## 2022-09-29 ASSESSMENT — REFRACTION_FINALRX
OS_VPRISM: 1.0
OD_HBASE: OUT
OD_HPRISM: 1.5
OS_HPRISM: 1.5
OD_VPRISM: 1.0
OS_HBASE: OUT

## 2022-09-29 ASSESSMENT — SLIT LAMP EXAM - LIDS
COMMENTS: UL DERMATOCHALASIS
COMMENTS: UL DERMATOCHALASIS

## 2022-09-29 ASSESSMENT — CONF VISUAL FIELD
OS_NORMAL: 1
OD_NORMAL: 1

## 2022-09-29 ASSESSMENT — TONOMETRY
OD_IOP_MMHG: 14
OS_IOP_MMHG: 14
IOP_METHOD: ICARE

## 2022-09-29 ASSESSMENT — EXTERNAL EXAM - RIGHT EYE: OD_EXAM: NORMAL

## 2022-09-29 ASSESSMENT — EXTERNAL EXAM - LEFT EYE: OS_EXAM: NORMAL

## 2022-09-29 NOTE — PROGRESS NOTES
Assessment/Plan  (F07.81) Postconcussion syndrome  (primary encounter diagnosis)  Comment: Injured 5/31/22  Plan: Discussed findings with patient. Suspect that new glasses will improve symptoms by increasing clarity and reducing eye strain. Patient would appear to benefit from both horizontal and vertical prisms, as fusion greatly improved in clinic with these. Recommend patient update new glasses and return to clinic in about 1 month if symptoms fail to improve.     (H50.9) Strabismus  Comment: History of esotropia corrected by surgery about 15 years ago per patient.   Plan: Discussed findings with patient. Slight esotropia and right hypertropia remain today but fusion appears to be adequate with prisms. Consider referral to strabismus specialist with prolonged adaptation difficulty to glasses.     (H02.831,  H02.834) Dermatochalasis of both upper eyelids  Comment: Likely slightly obstructing superior visual field   Plan: Consider oculoplastics referral. Hold off until other concussion symptoms have subsided, as this has the potential of increasing photophobia.     (H53.143) Photophobia of both eyes  Comment: Patient wears dark fitovers almost constantly.   Plan: Discussed potential for dark adaptation with patient. Recommend patient begin to return to prior lighting levels and wean off of fitovers- particularly indoors. Monitor.     (H52.4) Presbyopia  Plan: REFRACTION [[id number]]        Discussed findings with patient. New spectacle prescription dispensed to patient. Patient is welcome to return to clinic with prolonged adaptation difficulties.     Complete documentation of historical and exam elements from today's encounter can  be found in the full encounter summary report (not reduplicated in this progress  note). I personally obtained the chief complaint(s) and history of present illness. I  confirmed and edited as necessary the review of systems, past medical/surgical  history, family history, social history,  and examination findings as documented by  others; and I examined the patient myself. I personally reviewed the relevant tests,  images, and reports as documented above. I formulated and edited as necessary the  assessment and plan and discussed the findings and management plan with the  patient and family.    Cristino Gunn OD

## 2022-10-04 ENCOUNTER — THERAPY VISIT (OUTPATIENT)
Dept: PHYSICAL THERAPY | Facility: CLINIC | Age: 57
End: 2022-10-04
Payer: OTHER MISCELLANEOUS

## 2022-10-04 DIAGNOSIS — M54.2 NECK PAIN: Primary | ICD-10-CM

## 2022-10-04 DIAGNOSIS — G44.209 TENSION HEADACHE: ICD-10-CM

## 2022-10-04 PROCEDURE — 97110 THERAPEUTIC EXERCISES: CPT | Mod: GP | Performed by: PHYSICAL THERAPIST

## 2022-10-04 PROCEDURE — 97140 MANUAL THERAPY 1/> REGIONS: CPT | Mod: GP | Performed by: PHYSICAL THERAPIST

## 2022-10-07 ENCOUNTER — THERAPY VISIT (OUTPATIENT)
Dept: PHYSICAL THERAPY | Facility: CLINIC | Age: 57
End: 2022-10-07
Payer: OTHER MISCELLANEOUS

## 2022-10-07 ENCOUNTER — VIRTUAL VISIT (OUTPATIENT)
Dept: PALLIATIVE MEDICINE | Facility: CLINIC | Age: 57
End: 2022-10-07
Payer: OTHER MISCELLANEOUS

## 2022-10-07 DIAGNOSIS — G24.3 CERVICAL DYSTONIA: ICD-10-CM

## 2022-10-07 DIAGNOSIS — S09.90XS FATIGUE DUE TO OLD HEAD INJURY: ICD-10-CM

## 2022-10-07 DIAGNOSIS — F07.81 POST CONCUSSION SYNDROME: ICD-10-CM

## 2022-10-07 DIAGNOSIS — M54.2 NECK PAIN: Primary | ICD-10-CM

## 2022-10-07 DIAGNOSIS — R53.83 FATIGUE DUE TO OLD HEAD INJURY: ICD-10-CM

## 2022-10-07 DIAGNOSIS — G44.209 TENSION HEADACHE: ICD-10-CM

## 2022-10-07 DIAGNOSIS — G43.009 MIGRAINE WITHOUT AURA AND WITHOUT STATUS MIGRAINOSUS, NOT INTRACTABLE: Primary | ICD-10-CM

## 2022-10-07 DIAGNOSIS — S06.0X9D CONCUSSION WITH LOSS OF CONSCIOUSNESS, SUBSEQUENT ENCOUNTER: ICD-10-CM

## 2022-10-07 PROCEDURE — 97140 MANUAL THERAPY 1/> REGIONS: CPT | Mod: GP | Performed by: PHYSICAL THERAPIST

## 2022-10-07 PROCEDURE — 99212 OFFICE O/P EST SF 10 MIN: CPT | Mod: 95 | Performed by: PHYSICAL MEDICINE & REHABILITATION

## 2022-10-07 PROCEDURE — 97110 THERAPEUTIC EXERCISES: CPT | Mod: GP | Performed by: PHYSICAL THERAPIST

## 2022-10-07 RX ORDER — BUTALBITAL, ACETAMINOPHEN AND CAFFEINE 50; 325; 40 MG/1; MG/1; MG/1
1 TABLET ORAL DAILY PRN
Qty: 14 TABLET | Refills: 1 | Status: SHIPPED | OUTPATIENT
Start: 2022-10-07 | End: 2022-10-21
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
## 2022-10-11 ENCOUNTER — THERAPY VISIT (OUTPATIENT)
Dept: PHYSICAL THERAPY | Facility: CLINIC | Age: 57
End: 2022-10-11
Payer: OTHER MISCELLANEOUS

## 2022-10-11 DIAGNOSIS — M54.2 NECK PAIN: Primary | ICD-10-CM

## 2022-10-11 DIAGNOSIS — G44.209 TENSION HEADACHE: ICD-10-CM

## 2022-10-11 PROCEDURE — 97110 THERAPEUTIC EXERCISES: CPT | Mod: GP | Performed by: PHYSICAL THERAPIST

## 2022-10-11 PROCEDURE — 97112 NEUROMUSCULAR REEDUCATION: CPT | Mod: GP | Performed by: PHYSICAL THERAPIST

## 2022-10-21 ENCOUNTER — TELEPHONE (OUTPATIENT)
Dept: PALLIATIVE MEDICINE | Facility: CLINIC | Age: 57
End: 2022-10-21

## 2022-10-21 NOTE — TELEPHONE ENCOUNTER
Called patient to give an update about insurance and was informed that she spoke to their  and nurse who stated that the insurance should be authorized for Botox. Pt stated that the insurance is [employer] and that they would like us to call their nurse, Devika at [phone], to discuss workman's comp further.    Paige York, EMT

## 2022-10-21 NOTE — TELEPHONE ENCOUNTER
Attempted to call patient back, no answer.     Left message on patient's self-identifying VM that the information she provided was sent to the financial office to review and someone will call back with an update once more information is known.    MARIAM HuttonN RN Care Coordinator  [facility]   [facility] # [phone]

## 2022-10-23 ENCOUNTER — HEALTH MAINTENANCE LETTER (OUTPATIENT)
Age: 57
End: 2022-10-23

## 2022-10-25 ENCOUNTER — THERAPY VISIT (OUTPATIENT)
Dept: PHYSICAL THERAPY | Facility: CLINIC | Age: 57
End: 2022-10-25
Payer: OTHER MISCELLANEOUS

## 2022-10-25 DIAGNOSIS — G44.209 TENSION HEADACHE: ICD-10-CM

## 2022-10-25 DIAGNOSIS — M54.2 NECK PAIN: Primary | ICD-10-CM

## 2022-10-25 PROCEDURE — 99207 PR NO CHARGE LOS: CPT | Mod: GP | Performed by: PHYSICAL THERAPIST

## 2022-10-26 NOTE — TELEPHONE ENCOUNTER
[facility] Call Center    Phone Message    May a detailed message be left on voicemail: yes     Reason for Call: Other: Patient needs to reschedule her appt with Dr. Leo on 12/12. Writer unable to schedule because we do not schedule for Pain Clinic, only PRM. Please contact patient to reschedule.      Action Taken: Message routed to:  [facility] ([facility]): physical medicine and rehab    Travel Screening: Not Applicable

## 2022-10-26 NOTE — TELEPHONE ENCOUNTER
Returned a call to patient to reschedule her appt fr om 12/12/22 to 1/16/22 at 8:45am. Due to MD out of the office.

## 2022-10-31 ENCOUNTER — TELEPHONE (OUTPATIENT)
Dept: PHYSICAL MEDICINE AND REHAB | Facility: CLINIC | Age: 57
End: 2022-10-31

## 2022-10-31 NOTE — TELEPHONE ENCOUNTER
[facility] Call Center    Phone Message    May a detailed message be left on voicemail: yes     Reason for Call: Appointment Intake    Referring Provider Name: ROLAND BLANTON  Diagnosis and/or Symptoms: Botox for migraines    Patient is requesting a call back to schedule her botox appt for migraines. Writer unable to schedule due to protocols. Please call patient back to schedule at # [phone]    Action Taken: Message routed to:  [facility] ([facility]): PM[facility]R    Travel Screening: Not Applicable

## 2022-10-31 NOTE — PROGRESS NOTES
"[facility]    Outpatient Physical Therapy Discharge Note  Patient: Rtuh Amador  : 1965    Beginning/End Dates of Reporting Period:  22 to 22. Ruth was seen for 6 visits in this EOC for vestibular/balance rehab, neck stretching/strengthening and manual therapy.    Referring Provider: George Leo, DO    Therapy Diagnosis: (+) s/s consistant with cervicogenic HA s/p concussion     Client Self Report: Pt hit top of head on wall recently with increase in HA, neck pain. Plan to transition to sport and PT per pt. recommending botox and traction. Stretches only very temporary relief. planning to do more with yoga and meditation.    Objective Measurements:  Objective Measure: CROM  Details: R rotation 40 degrees, L rotation 30 degrees    Objective Measure: deep neck flexor endurance  Details: 20\" with effort, pain      Outcome Measures (most recent score):  Concussion Symptom Assessment (score out of 90). A higher score indicates greater impairment: 48     Goals:  Goal Identifier CONCUSSION SYMPTOM ASSESSMENT:   Goal Description Pt to report feeling '85% better' with return to her baseline of 'mild' neck pain, headache and balance limitations on CSA.   Target Date 22   Date Met      Progress (detail required for progress note): Goal not met, pt reports exacerbation of neck pain, 6/ today on CSA. Overall CSA score 48/90, at eval 22: (CSA): 52/90.      Goal Identifier CERVICAL:   Goal Description Pt to demonstrate cervical ROM WNL and pain-free during AROM and Cervical flexion-rotation test to reflect significant improvement in cervical mobility, decreased neck pain and HA.   Target Date 22   Date Met      Progress (detail required for progress note): 22- some improvement in AROM cervical rotation despite increased pain, 40 degrees R and 30 degrees L. pt demo major loss of C2 " "ROM B, L more limited than R. at eval 6/8/22: Cervical AROM rotation: R: 30 deg. L 28 deg. Cervical flexion-roatation test: (+) impaired consistant with  Cervicogenic HA. < 25 deg, limited L rot > R (abnormal: 20-28 degrees; < 32 degrees or  > 10 degrees diff side to side).      Goal Identifier SLEEP:   Goal Description Pt to report she is able to sleep without disturbance from neck pain.   Target Date 09/14/22   Date Met      Progress (detail required for progress note): goal not met. Sleep is rated 6/6 \"excrutiating\" due to neck pain. Still feels better to hang head in extension. at [facility], 6/8/22: uses a bolster under her neck, likes holding her head off the side of the bed for relief from headache/neck pain.     Goal Identifier UE LIFTING+CARRYING   Goal Description Pt to demonstrate ability for UE reaching, lifting, carrying tasks required for participation in work, grocery shopping and houshold tasks with report of </= 'mild' neck pain.   Target Date 09/14/22   Date Met      Progress (detail required for progress note): Goal not met.     Goal Identifier EXERCISE:   Goal Description Pt to consistantly participate in developing cervical exercise HEP for progress towards all goals, with ability to return to exercise at gym including UE weights without exacerbation of neck pain > mild.   Target Date 09/14/22   Date Met      Progress (detail required for progress note): Goal not met. pt reports consistent performance of HEP but it is only providing very mild, short term relieve from symtoms. She is not back to the gym.       Plan:  Discharge from therapy.    Discharge:    Reason for Discharge: patients main  from PT standpoint is cervical pain and dysfunction. She would be better served with an orthopedic focus. She is discharged from vestibular rehab to continue ortho PT with emphasis on neck.    Discharge Plan: Patient to continue home program.  Other services: ortho PT for neck.  "

## 2022-11-03 ENCOUNTER — OFFICE VISIT (OUTPATIENT)
Dept: OPTOMETRY | Facility: CLINIC | Age: 57
End: 2022-11-03
Payer: OTHER MISCELLANEOUS

## 2022-11-03 DIAGNOSIS — H52.4 PRESBYOPIA: Primary | ICD-10-CM

## 2022-11-03 ASSESSMENT — CONF VISUAL FIELD
OS_NORMAL: 1
OD_SUPERIOR_NASAL_RESTRICTION: 0
OS_SUPERIOR_NASAL_RESTRICTION: 0
OD_INFERIOR_TEMPORAL_RESTRICTION: 0
OD_INFERIOR_NASAL_RESTRICTION: 0
OS_SUPERIOR_TEMPORAL_RESTRICTION: 0
OD_SUPERIOR_TEMPORAL_RESTRICTION: 0
OS_INFERIOR_TEMPORAL_RESTRICTION: 0
OS_INFERIOR_NASAL_RESTRICTION: 0
OD_NORMAL: 1

## 2022-11-03 ASSESSMENT — REFRACTION_MANIFEST
OD_ADD: +2.50
OD_AXIS: 105
OS_SPHERE: +0.50
OS_CYLINDER: +1.00
OS_ADD: +2.50
OD_CYLINDER: +1.25
OD_SPHERE: +1.00
OS_AXIS: 085

## 2022-11-03 ASSESSMENT — VISUAL ACUITY
OS_CC+: -2
METHOD: SNELLEN - LINEAR
OS_CC: 20/20
OD_CC: 20/25
CORRECTION_TYPE: GLASSES

## 2022-11-03 NOTE — PROGRESS NOTES
Assessment/Plan  (H52.4) Presbyopia  (primary encounter diagnosis)  Comment: Rx check only today  Plan: Discussed findings with patient today. Minimal changes found to Rx in office, so suspect that monocular shadowing is related more to irregular astigmatism or eye dryness. Suggested using lubricating drops (Refresh PF) at least twice daily for next few days. Patient may also consider a lighter frame, as her thick dark frames are also bothering her. Call or return to clinic if symptoms persist. Consider a GP CL evaluation with Kriss if symptoms persist.         Complete documentation of historical and exam elements from today's encounter can  be found in the full encounter summary report (not reduplicated in this progress  note). I personally obtained the chief complaint(s) and history of present illness. I  confirmed and edited as necessary the review of systems, past medical/surgical  history, family history, social history, and examination findings as documented by  others; and I examined the patient myself. I personally reviewed the relevant tests,  images, and reports as documented above. I formulated and edited as necessary the  assessment and plan and discussed the findings and management plan with the  patient and family.    Cristino Gunn OD

## 2022-11-09 ENCOUNTER — TELEPHONE (OUTPATIENT)
Dept: PALLIATIVE MEDICINE | Facility: CLINIC | Age: 57
End: 2022-11-09

## 2022-11-09 NOTE — TELEPHONE ENCOUNTER
Per Dr. Leo, he would like to have patient attend the visit on 11/11 for additional assessment and documentation of Botox need, and pursuit of acquiring prior authorization approval.      Writer called patient and left VM message asking her to call back and confirm that she will attend the appointment on 11/11 at 12:45pm.      Patient needs to know that she will not be receiving Botox at this visit, but need her to keep the appointment to gather more clinical information so to help get the Botox approved by her insurance.    Maame Dan RN on 11/9/2022 at 4:13 PM
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
## 2022-11-11 ENCOUNTER — OFFICE VISIT (OUTPATIENT)
Dept: PALLIATIVE MEDICINE | Facility: CLINIC | Age: 57
End: 2022-11-11
Payer: OTHER MISCELLANEOUS

## 2022-11-11 VITALS
DIASTOLIC BLOOD PRESSURE: 74 MMHG | BODY MASS INDEX: 23.03 KG/M2 | HEART RATE: 57 BPM | OXYGEN SATURATION: 97 % | HEIGHT: 63 IN | SYSTOLIC BLOOD PRESSURE: 123 MMHG

## 2022-11-11 DIAGNOSIS — G43.009 MIGRAINE WITHOUT AURA AND WITHOUT STATUS MIGRAINOSUS, NOT INTRACTABLE: ICD-10-CM

## 2022-11-11 DIAGNOSIS — H93.13 TINNITUS, BILATERAL: ICD-10-CM

## 2022-11-11 DIAGNOSIS — S09.90XS FATIGUE DUE TO OLD HEAD INJURY: ICD-10-CM

## 2022-11-11 DIAGNOSIS — F07.81 POST CONCUSSION SYNDROME: ICD-10-CM

## 2022-11-11 DIAGNOSIS — R53.83 FATIGUE DUE TO OLD HEAD INJURY: ICD-10-CM

## 2022-11-11 DIAGNOSIS — R68.89 LIGHT SENSITIVITY: ICD-10-CM

## 2022-11-11 DIAGNOSIS — S06.0X9D CONCUSSION WITH LOSS OF CONSCIOUSNESS, SUBSEQUENT ENCOUNTER: ICD-10-CM

## 2022-11-11 DIAGNOSIS — G24.3 CERVICAL DYSTONIA: Primary | ICD-10-CM

## 2022-11-11 PROCEDURE — 99215 OFFICE O/P EST HI 40 MIN: CPT | Mod: 25 | Performed by: PHYSICAL MEDICINE & REHABILITATION

## 2022-11-11 PROCEDURE — 64615 CHEMODENERV MUSC MIGRAINE: CPT | Performed by: PHYSICAL MEDICINE & REHABILITATION

## 2022-11-11 PROCEDURE — 95874 GUIDE NERV DESTR NEEDLE EMG: CPT | Performed by: PHYSICAL MEDICINE & REHABILITATION

## 2022-11-11 RX ORDER — SERTRALINE HYDROCHLORIDE 100 MG/1
100 TABLET, FILM COATED ORAL DAILY
COMMUNITY
End: 2023-08-11

## 2022-11-11 RX ORDER — BUTALBITAL, ACETAMINOPHEN AND CAFFEINE 50; 325; 40 MG/1; MG/1; MG/1
1 TABLET ORAL EVERY 4 HOURS PRN
COMMUNITY

## 2022-11-11 NOTE — TELEPHONE ENCOUNTER
"[employer] Dept informed us that they were \"able to verify with the [facility] that Botox is authorized under her work comp so she is good to go for today.\"  They also informed patient, so writer informed Dr. Leo that patient would be able to have Botox at today's visit.  Maame Dan RN on 11/11/2022 at 2:41 PM    "
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
## 2022-11-11 NOTE — PATIENT INSTRUCTIONS
"    GENERAL ADVICE:  ~ Gradually ease back into your usual activities.   ~ Rest as needed to help your symptoms go away.  - Consider pairing 50 minutes of activity with 10 minutes of rest.  ~ Allow yourself more time for activities.  ~ Write things down.  At home, at work, whenever there is something that you should remember, even it is simple.  SCREENS:  ~ Change settings on your phone and computer using the \"Blue Light Filter\" (Night Shift on all  [employer] products)  ~ The goal is making screens more yellow and less blue.    ~ If this is not an option you can download this program, IQzone, to adjust your screen resolution.  ~ [employer] for various filter and font apps  ~ Turn screen brightness down  ~ Increase font size  ~ Limit screen activities including computer, TV and phones.  NECK PAIN:  ~ Ice or Heat are good~  ~ Massage is ok if it doesn't trigger more symptoms~  ~ Gentle stretches and range-of-motion are helpful.  DIZZINESS:  ~ No driving when dizzy.  ~ No biking, climbing heights or ladders if dizzy.  FATIGUE:  ~ Daily exercise is strongly encouraged.  Start with a 10 min walk and increase the time as tolerated until you are walking 30 minutes per day.    ~ Focus on Good sleep hygiene instead of napping . Your goal should be 8 hours of sleep every night.  ~ Try Melatonin 1 hour before bed  ANXIETY OR MOOD SWINGS:  ~ If you are irritable or anxious, take a break in a quiet room.  ~ Try using the free Calm lillian for guided breathing and mindfulness/meditation.  ~ Explore Cherry Bugs (https://www.headspace.com) for free and easy-to-use meditation guidance.  LIGHT SENSITIVITIES:  ~ Avoid florescent lighting when possible.  ~Yellow or balta tinted lenses may help reduce computer or night-time road glare.             ~ Amazon has a $10.00 option: [employer] yellow Night Vision.  NOISE SENSITIVITIES:  ~ Try listening to calming sounds such as the \"Calm Lillian\" to help shift your focus off of more irritating " sounds.  ~ Avoid crowded areas at first then slowly introduce yourself to small increments of crowded, noisy areas.  ~ Try High fidelity earplugs used by [employer] E[employer]motic ETY-Plugs, can be found on Amazon for $13.00.  DIET:  - In principle incorporate more protein, lots of veggies, some fruit, whole grains.    - Less sweets and saturated fat.   - Mediterranean Diet is an easy-to-follow example.  ~ Drink plenty of water throughout the day (8-10 glasses per day)    [facility]   Phone # [phone]  Fax # [phone]  Scheduling; # [phone]      Thank you for allowing us to be a part of your care.
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
## 2022-11-11 NOTE — TELEPHONE ENCOUNTER
[employer] team informed us that they were able to verify with the [facility] that Botox is authorized under her work comp so she is good to go for today. Patient and Dr. Leo were informed that patient is able to receive Botox injections at today's visit.    Maame Dan RN on 11/11/2022 at 2:43 PM
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
## 2022-12-01 ENCOUNTER — TELEPHONE (OUTPATIENT)
Dept: OPHTHALMOLOGY | Facility: CLINIC | Age: 57
End: 2022-12-01

## 2022-12-01 NOTE — TELEPHONE ENCOUNTER
[facility] Call Center    Phone Message    May a detailed message be left on voicemail: yes     Reason for Call: Other:   Pt scheduled another appt because prisms/prescription is not working. Pt states that Dr Gunn had previously mentioned that if Rx continues to not work, then Luis A wants to refer pt to a colleague for contacts. Pt inquiring if she should still come in to see Luis A or if pt would benefit from seeing the colleague instead? Please advise.     Action Taken: Other:  eye    Travel Screening: Not Applicable

## 2022-12-02 ENCOUNTER — MYC MEDICAL ADVICE (OUTPATIENT)
Dept: PHYSICAL MEDICINE AND REHAB | Facility: CLINIC | Age: 57
End: 2022-12-02

## 2022-12-02 NOTE — TELEPHONE ENCOUNTER
Called and spoke to Harvey     We were not able to schedule an appointment with Dr. Monterroso before 1/8 that is the date Harvey's insurance gave her to return her glasses.     Harvey stated her glasses are not working out and wanted to be seen. I scheduled an appointment with Dr. CURT Boyce Communication Facilitator on 12/2/2022 at 9:11 AM

## 2022-12-05 ENCOUNTER — TELEPHONE (OUTPATIENT)
Dept: OPTOMETRY | Facility: CLINIC | Age: 57
End: 2022-12-05

## 2022-12-05 NOTE — TELEPHONE ENCOUNTER
[facility] Call Center    Phone Message    May a detailed message be left on voicemail: yes     Reason for Call: Other: Please call patient back, she would like to speak with clinic regarding paperwork. Please do not send [employer] message. Please call her at [phone].     Action Taken: Message routed to:  [facility] ([facility]): physical medicine and rehab    Travel Screening: Not Applicable

## 2022-12-05 NOTE — TELEPHONE ENCOUNTER
There was a cancellation so I put her on at 12/22/22 10:00am ([facility]) if she can make that. If she cannot make that then we should keep the January one     Called and left a message     Summer Boyce Communication Facilitator on 12/5/2022 at 1:46 PM

## 2022-12-08 ENCOUNTER — OFFICE VISIT (OUTPATIENT)
Dept: OPTOMETRY | Facility: CLINIC | Age: 57
End: 2022-12-08
Payer: OTHER MISCELLANEOUS

## 2022-12-08 DIAGNOSIS — H52.4 PRESBYOPIA: Primary | ICD-10-CM

## 2022-12-08 ASSESSMENT — REFRACTION_MANIFEST
OS_CYLINDER: +1.00
OS_SPHERE: +0.50
OS_AXIS: 085
OD_ADD: +2.50
OD_AXIS: 100
OS_ADD: +2.50
OD_CYLINDER: +1.25
OD_SPHERE: +1.00

## 2022-12-09 ASSESSMENT — VISUAL ACUITY
OS_CC+: -2
METHOD: SNELLEN - LINEAR
OS_CC: 20/20
OD_CC: 20/25
CORRECTION_TYPE: GLASSES

## 2022-12-09 ASSESSMENT — CONF VISUAL FIELD
OS_INFERIOR_NASAL_RESTRICTION: 0
OS_SUPERIOR_NASAL_RESTRICTION: 0
OD_NORMAL: 1
OD_INFERIOR_NASAL_RESTRICTION: 0
OD_SUPERIOR_NASAL_RESTRICTION: 0
OD_INFERIOR_TEMPORAL_RESTRICTION: 0
OS_SUPERIOR_TEMPORAL_RESTRICTION: 0
OD_SUPERIOR_TEMPORAL_RESTRICTION: 0
OS_INFERIOR_TEMPORAL_RESTRICTION: 0
OS_NORMAL: 1

## 2022-12-09 NOTE — PROGRESS NOTES
Assessment/Plan  (H52.4) Presbyopia  (primary encounter diagnosis)  Comment: Glasses check only tody  Plan: No significant changes found today. Recommend patient visit with CL specialist to see if vision can be improved with RGP.     1/31/2023: Rx generated without prisms per patient request     Complete documentation of historical and exam elements from today's encounter can  be found in the full encounter summary report (not reduplicated in this progress  note). I personally obtained the chief complaint(s) and history of present illness. I  confirmed and edited as necessary the review of systems, past medical/surgical  history, family history, social history, and examination findings as documented by  others; and I examined the patient myself. I personally reviewed the relevant tests,  images, and reports as documented above. I formulated and edited as necessary the  assessment and plan and discussed the findings and management plan with the  patient and family.    Cristino Gunn OD

## 2023-01-03 PROBLEM — G44.209 TENSION HEADACHE: Status: RESOLVED | Noted: 2022-09-22 | Resolved: 2023-01-03

## 2023-01-03 PROBLEM — M54.2 NECK PAIN: Status: RESOLVED | Noted: 2022-09-22 | Resolved: 2023-01-03

## 2023-01-03 NOTE — PROGRESS NOTES
DISCHARGE REPORT    Progress reporting period is from 9-22-22 to 10-11-22. Pt completed 4 sessions total.       SUBJECTIVE  Subjective changes noted by patient:  No overall change in pain level. Reports temporary relief with use of self traction-lying with head off edge of bed.   Current pain level is variable.     Previous pain level was  6/10.   Changes in function:  Goals ongoing.  Adverse reaction to treatment or activity: activity - lifting, reading, working, driving, sleeping    OBJECTIVE  CROM: L rotation: 25% with pain; R rotation: 33% with pain; extension: 33% with pain.        ASSESSMENT/PLAN  Updated problem list and treatment plan: Diagnosis 1:  Neck pain/headaches   STG/LTGs have been met or progress has been made towards goals:  None  Assessment of Progress: The patient's condition is unchanged.  Self Management Plans:  Patient has been instructed in a home treatment program.  I have re-evaluated this patient and find that the nature, scope, duration and intensity of the therapy is appropriate for the medical condition of the patient.  Harvey continues to require the following intervention to meet STG and LTG's:  No further PT planned.    Recommendations:  No further PT planned due to ongoing issues with insurance.    Please refer to the daily flowsheet for treatment today, total treatment time and time spent performing 1:1 timed codes.

## 2023-01-09 ENCOUNTER — TELEPHONE (OUTPATIENT)
Dept: PHYSICAL MEDICINE AND REHAB | Facility: CLINIC | Age: 58
End: 2023-01-09

## 2023-01-09 NOTE — TELEPHONE ENCOUNTER
[facility] Call Center    Phone Message    May a detailed message be left on voicemail: yes     Reason for Call: Other: Patient is calling regarding a form that was to be completed by Dr. Leo. Please call to discuss.      Action Taken: Other: PMR    Travel Screening: Not Applicable

## 2023-01-10 NOTE — TELEPHONE ENCOUNTER
Spoke with patient, and I re-faxed the letter from 11/11 written by Dr Leo. To Otilio at [phone]. Pt is aware this has been refaxed.

## 2023-02-15 NOTE — PROGRESS NOTES
"[facility]    Outpatient Occupational Therapy Discharge Note  Patient: Harvey Amador  : 1965    Beginning/End Dates of Reporting Period:  22 to 22 (with most recent reporting period being 22 - 22)    Referring Provider: George Leo DO     Therapy Diagnosis: Decreased IND with ADLs/IADLs    Client Self Report: Reports she continues to have a bad headache and lots of pain in her neck and back today. Reports today was the first day she realized why there are handicap door pushers as the doors felt too heavy for her to open today. Reports she had a near fall at home and hit her head on the wall, reports it wasn't very bad but did have a follow-up appointment with her concussion doctor Dr. Leo. Reports she is feeling more hopeful today and that seeing her counselor is going great. Reports she has been trying to reorganize things one box at a time at her house. Reports she tried to keep the same \"plan\" that was created in her last OT session.      Outcome Measures (most recent score):  Concussion Symptom Assessment (score out of 90). A higher score indicates greater impairment: 48 severity (13 symptoms marked, improved from previous session, see flowsheet for details)    Goals:     Goal Identifier Symptom Management   Goal Description Pt will identify and use 3 symptom management strategies (computer adaptations, self-awareness and self-management of symptoms, use of adaptive techniques, Safe and Sound protocol, etc.) to promote ADL/IADL completion (e.g. reading, exercising, work, sleep).   Target Date 22   Date Met      Progress (detail required for progress note):  Goal progressing but not fully met. Pt educated in stovetop burner analogy for symptom/fatigue management. Further educated on managing daily life with concussion symptoms including sensory adaptations " "(see vision goal below), reducing number of trips outside of her home each day, avoiding busy/crowded/loud environments, reducing/avoiding driving as able, reducing/avoiding work until further able to manage symptoms, managing stress, and completing light exercise to promote recovery. Educated in tracking system to promote self-awareness and management of symptoms with pt having intermittent follow-through with log due to difficulty completing when symptoms are exacerbated. Planned to continue to educate and train pt in appropriate strategies to promote IND with ADLs/IADLs and assist with eventual return to work, however pt choosing to discontinue services.     Goal Identifier Fatigue Management   Goal Description Pt will demonstrate 3 energy conservation strategies (e.g. pacing, planning, prioritizing, sleep hygiene, etc.) to facilitate fatigue management with ADL/IADL tasks (e.g. laundry, meal preparation, walking/exercise).   Target Date 11/30/22   Date Met      Progress (detail required for progress note):  Goal progressing but not fully met. Pt educated in stovetop burner analogy for symptom/fatigue management, as well as specific energy conservation principles of plan, prioritize, pace, and posture with specific application to ADLs/IADLs, including kitchen tasks. Significant focus on \"plan\" for each day with pt tasked with prioritizing 2-3 tasks to complete in her day with focus of reducing her physical and cognitive strain with aim to increase activity as able. Planned to continue to educate and train pt in appropriate strategies to promote IND with ADLs/IADLs, however pt choosing to discontinue services.     Goal Identifier Vision   Goal Description Pt will independently demonstrate 3 compensatory strategies and/or adaptations (electronic adaptations, glare reduction, reducing visual clutter, increasing text size, etc.) for symptom management (eye strain, headache, etc.) and accuracy with visual tasks. " "  Target Date 11/30/22   Date Met      Progress (detail required for progress note):  Goal progressing but not fully met. Pt educated in visual compensatory strategies to promote IND with ADLs/IADLs, including reduce glare (wear tinted glasses - pt now has pair for indoor and pair for outdoor), use colored transparencies  which increase contrast and reduce glare (pt issued blue), use full spectrum light bulbs, use a yellow legal pad to avoid sharp lighting changes, avoid night driving (and reduce driving altogether; pt receiving rides to therapy), minimize visual clutter/stay organized; block out \"visual noise\" when reading (green cardstocks/contrasting paper); dim lights if causing eye strain, use goose neck task lighting; adjust computer/iPad/phone screens and use text to speech google extension to reduce effort with reading comprehension issues (provided training and demonstration in phone and computer settings to reduce eye strain - reverse contrast, increase text size, night shift/night light, solid background/reduce visual clutter, screen reader). Additionally, pt was seen by low vision rehab specialist who recommended pt see neuro-ophthalmologist, Use large print whenever possible; Try to wear your current glasses (old Rx) all the time (work up to this), 20/20/20 rule, Use camera on your phone as a magnifier for small print or get magnifier ana. Also trialed binasal occlusion with pt initially disliking tape then feeling it was helpful. Planned to continue to address in future sessions, prn, however pt choosing to discontinue services.     Goal Identifier Memory   Goal Description Patient will demonstrate improved memory skills by completing short-term memory tasks in occupational therapy with 90% accuracy using compensatory strategies for increased safety and independence with ADL/IADLS (keeping up schedule, remembering to take medications, remember to switch laundry).   Target Date 11/30/22   Date Met    "   Progress (detail required for progress note):  Goal progressing but not fully met. Completed CAM assessment - see objective measures above. Pt requiring 3 days to complete testing due to decreased attention and tolerance for cognitive activity. Planned to further address memory skills for IND with ADLs/IADLS in future sessions, but goal had yet to be further addressed due to complexity of goal and focus on symptom management in first few sessions of OT. Pt choosing to discontinue OT services prior to further progression of goal.     Goal Identifier Problem-Solving/Planning   Goal Description Pt will demonstrate the ability to complete moderate to complex problem-solving/planning tasks (WCPA, Errands, [employer], SET, etc.) with 90% accuracy to complete home and work tasks safely and accurately (e.g. meal preparation, financial management, medication management, driving, work).   Target Date 11/30/22   Date Met      Progress (detail required for progress note):  Goal progressing but not fully met. Completed CAM assessment - see objective measures above. Pt requiring 3 days to complete testing due to decreased attention and tolerance for cognitive activity. Planned to further address higher level cognitive skills for IND with ADLs/IADLs in future sessions, but goal had yet to be further addressed due to complexity of goal and focus on symptom management in first few sessions of OT. Pt choosing to discontinue OT services prior to further progression of goal.     Goal Identifier HEP   Goal Description Pt to demonstrate IND completion of HEP, as assigned, with at least 85% consistency to promote IND with ADLs/IADLs.   Target Date 11/30/22   Date Met      Progress (detail required for progress note):  Goal progressing but not fully met. Pt demonstrating adherence to recommendations between sessions, attempting to apply symptom and fatigue management strategies to promote participation in ADLs/IADLs. Reports to OTR her  "success with using strategies with pt noting most beneficial so far being use of light filtering glasses both indoors and outdoors to reduce light sensitivity. Also notes pacing of tasks being helpful but continues to work on refraining from \"pushing\" past her limits. Planned to further progress HEP with more formal physical and cognitive exercises, as able, pending pt's progress, but pt choosing to discontinue OT services prior to further progression of goal.       Plan:  Discharge from therapy. Pt was seen for a total of 10 OP OT sessions with focus on the above goal areas (see progress noted above). Pt continues to experience reductions and exacerbations in symptoms and continues to have significant difficulty with participating in her ADLs and IADLs as a result. Pt remains appropriate for OT services but chooses to discontinue OT services at this time, citing difficulties with insurance coverage as a barrier to receiving her care.    Discharge:    Reason for Discharge: Patient chooses to discontinue therapy.    Equipment Issued: blue overlay, recommended Medium boisenberry cocoon sunglasses preferred for indoors/cloudy days and extra dark gray green (noir medical filter 02) preferred for outdoor/rochelle days    Discharge Plan: Patient to continue home program and is welcome to return to OP OT in the future, as needed, with new referral from her physician.    Thank you for the referral of this patient.  If you have any questions regarding the information in this report, please feel free to contact me per the information provided below.      Babita Swain MA, OTR/L  Occupational Therapist  [address] [address]  Clinic Fax:  [phone]  Clinic Phone: [phone]  "

## 2023-02-16 ENCOUNTER — TELEPHONE (OUTPATIENT)
Dept: PHYSICAL MEDICINE AND REHAB | Facility: CLINIC | Age: 58
End: 2023-02-16
Payer: MEDICAID

## 2023-02-16 NOTE — TELEPHONE ENCOUNTER
----- Message from Tuyet Lee sent at 2/16/2023  3:06 PM CST -----  Regarding: RE: botox appt 2/17/23  Lorenzo York,    What you have said is correct.  Insurance denied her PA last year in November.    Thank you,    Tuyet Macario    ----- Message -----  From: Chela Lovett RN  Sent: 2/16/2023   1:21 PM CST  To: Tuyet Lee, George Leo, DO, #  Subject: RE: botox appt 2/17/23                           Okay, thanks for the update Tuyet Macario!    So what I see in the record is that when we sent request for Botox auth to Medical insurance ([employer]) in November, Botox was denied and needs LMN - the submitted documentation simply does not meet insurance criteria...  we changed to the WC idea and never did LMN to [employer] - so the appt for Feb 17 (tomorrow) will not be for botox - does everyone agree?    Thanks,    Chela  ----- Message -----  From: Tuyet Lee  Sent: 2/16/2023  11:32 AM CST  To: Chela Lovett, RN, [employer] [facility]  Subject: RE: botox appt 2/17/23                           Lorenzo York,    I just spoke with an adjustor on this patient's work comp claim and was told that they reached a settlement with the patient on 01/25/2023 so no future treatments are covered.    Thank you,    Tuyet Macario    ----- Message -----  From: Chela Lovett RN  Sent: 2/15/2023  10:49 AM CST  To: Tuyet Lee, [employer]  Subject: botox appt 2/17/23                               Lorenzo Macario  Quick question -    Pt is on schedule for friday 2/17 Dr Leo    Looks like we need the re-verification of the coverage for botox with work comp again.    Please and thanks!    Chela Lovett RN on 2/15/2023 at 10:48 AM

## 2023-02-16 NOTE — TELEPHONE ENCOUNTER
"I called and spoke to Harvey, she said to cancel the appointment, she was not expecting to get Botox and had to \"forcibly resign\" from [employer] [employer] and currently is sorting out COBRA coverage.    Long story short, she does not want any appointment until she knows when she will have medical insurance.    Appointment is cancelled    Chela Lovett RN on 2/16/2023 at 4:38 PM    "

## 2023-04-04 ENCOUNTER — OFFICE VISIT (OUTPATIENT)
Dept: OPTOMETRY | Facility: CLINIC | Age: 58
End: 2023-04-04
Payer: MEDICAID

## 2023-04-04 DIAGNOSIS — H53.2 MONOCULAR DIPLOPIA OF BOTH EYES: ICD-10-CM

## 2023-04-04 DIAGNOSIS — H10.13 ALLERGIC CONJUNCTIVITIS, BILATERAL: ICD-10-CM

## 2023-04-04 DIAGNOSIS — H04.123 DRY EYES: ICD-10-CM

## 2023-04-04 DIAGNOSIS — H52.213 IRREGULAR ASTIGMATISM OF BOTH EYES: Primary | ICD-10-CM

## 2023-04-04 RX ORDER — FLUOROMETHOLONE 0.1 %
1 SUSPENSION, DROPS(FINAL DOSAGE FORM)(ML) OPHTHALMIC (EYE) 3 TIMES DAILY
Qty: 5 ML | Refills: 1 | Status: SHIPPED | OUTPATIENT
Start: 2023-04-04 | End: 2023-08-11

## 2023-04-04 ASSESSMENT — VISUAL ACUITY
OD_CC: 20/40-1
METHOD: SNELLEN - LINEAR
OS_CC: 20/30-1
CORRECTION_TYPE: GLASSES

## 2023-04-04 ASSESSMENT — REFRACTION_CURRENTRX
OS_DIAMETER: 15.4
OD_BRAND: ZENLENS RC
OS_SPHERE: -2.50
OS_BRAND: ONEFIT 2.0
OD_BASECURVE: 4.1/7.5
OD_DIAMETER: 15.2
OS_BASECURVE: 4.2/7.67
OS_DIAMETER: 15.2
OS_BASECURVE: 7.8
OS_SPHERE: -2.00
OD_BASECURVE: 7.7
OD_DIAMETER: 14.8
OD_SPHERE: -2.50
OD_BRAND: ONEFIT 2.0
OS_BRAND: ZENLENS RC
OD_SPHERE: -2.00

## 2023-04-04 ASSESSMENT — TONOMETRY
OD_IOP_MMHG: 07
IOP_METHOD: ICARE
OS_IOP_MMHG: 08

## 2023-04-04 ASSESSMENT — SLIT LAMP EXAM - LIDS
COMMENTS: MGD
COMMENTS: MGD

## 2023-04-04 ASSESSMENT — EXTERNAL EXAM - RIGHT EYE: OD_EXAM: PTOSIS OD>OS

## 2023-04-04 NOTE — PROGRESS NOTES
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 Previous soft CL wearer  -Irregular topos - may be caused by decreased blink rate affecting corneal physiology  -BCVA with scleral lens trial today 20/20- both eyes - monocular diplopia significantly improved but not resolved  -Does require FST for elimination of shadows  -Reviewed findings with pt - she would like to proceed. Aim for DVO and readers over to start    Order lenses, RTC 2 weeks lens dispense, I&R.    She is also interested in blepharoplasty referral. Clinic information given

## 2023-04-06 NOTE — TELEPHONE ENCOUNTER
FUTURE VISIT INFORMATION      FUTURE VISIT INFORMATION:    Date: 6/12/23    Time: 8:00am    [place] [facility] [place]:    Referring provider:  Yue Monterroso OD    Referring providers clinic:  [facility]    Reason for visit/diagnosis  droopy eyelids    RECORDS REQUESTED FROM:       Clinic name Comments Records Status Imaging Status   [facility] OV/referral 4/4/23  Ov/notes 4/4/23-9/29/22 EPIC         Previous Labs: Yes How Did The Hair Loss Occur?: gradual in onset How Severe Is Your Hair Loss?: moderate What Hair Products Do You Use?: Head and shoulders When Were The Labs Drawn? (Drawn...): 10/2021 Lab Details: [facility]

## 2023-04-11 ENCOUNTER — TELEPHONE (OUTPATIENT)
Dept: OPTOMETRY | Facility: CLINIC | Age: 58
End: 2023-04-11

## 2023-04-12 ENCOUNTER — OFFICE VISIT (OUTPATIENT)
Dept: OPTOMETRY | Facility: CLINIC | Age: 58
End: 2023-04-12
Payer: MEDICAID

## 2023-04-12 DIAGNOSIS — H53.2 MONOCULAR DIPLOPIA OF BOTH EYES: ICD-10-CM

## 2023-04-12 DIAGNOSIS — H04.123 DRY EYES: Primary | ICD-10-CM

## 2023-04-12 DIAGNOSIS — H52.213 IRREGULAR ASTIGMATISM OF BOTH EYES: ICD-10-CM

## 2023-04-12 RX ORDER — SODIUM CHLORIDE FOR INHALATION 0.9 %
3 VIAL, NEBULIZER (ML) INHALATION 2 TIMES DAILY
Qty: 300 ML | Refills: 4 | Status: SHIPPED | OUTPATIENT
Start: 2023-04-12 | End: 2023-08-11

## 2023-04-12 ASSESSMENT — SLIT LAMP EXAM - LIDS
COMMENTS: MGD
COMMENTS: MGD

## 2023-04-12 ASSESSMENT — REFRACTION_CURRENTRX
OD_CYLINDER: -0.75
OD_DIAMETER: 14.8
OS_BASECURVE: 4.1/7.5
OD_SPHERE: +2.25
OD_BRAND: ZENLENS RC
OD_AXIS: 100
OS_SPHERE: +2.50
OS_CYLINDER: -1.00
OD_BASECURVE: 4.1/7.5
OS_DIAMETER: 14.8
OS_BRAND: ZENLENS RC
OS_ADDL_SPECS: BOSTON XO BLUE, HYDRAPEG
OS_AXIS: 085

## 2023-04-12 ASSESSMENT — VISUAL ACUITY
VA_OR_OS_CURRENT_RX: 20/20
OD_CC: 20/40
VA_OR_OD_CURRENT_RX: 20/20-2
OS_CC: 20/30
METHOD: SNELLEN - LINEAR
CORRECTION_TYPE: GLASSES

## 2023-04-12 ASSESSMENT — EXTERNAL EXAM - RIGHT EYE: OD_EXAM: PTOSIS OD>OS

## 2023-04-12 NOTE — PROGRESS NOTES
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 Previous soft CL wearer  -Irregular topos - may be caused by decreased blink rate affecting corneal physiology  -BCVA with scleral lens trial today 20/20- both eyes - monocular diplopia significantly improved but not resolved  -Does require FST for elimination of shadows  -Successful I&R, reviewed CL care and hygiene ([place] Simplus, Addipak/Purilens)  -OTC readers around +2.00 range okay    2.) Dry Eye OU  -Symptomatic despite topical Tx  -May benefit from ocular surface support in scleral lens    Dispensed lenses today. RTC 2-3 week f/u wearing lenses.    I have confirmed the patient's CC, HPI and reviewed Past Medical History, Past Surgical History, Social History, Family History, Problem List, Medication List and agree with Tech note.     Yue Monterroso, OD FAAO FSLS      Contact Lens Billing  V-Code:  - Scleral Cover Shell  Final Contact Lens Rx       Brand Base Curve Diameter Sphere Cylinder Axis Lens Addl. Specs    Right Zenlens RC 4.1/7.5 14.8 +2.25 -0.75 100 2 flat H x 2 steep V [place] XO clear, HydraPEG    Left Zenlens RC 4.1/7.5 14.8 +2.50 -1.00 085 2 flat H x 2 steep V [place] XO blue, HydraPEG         # of units: 2  Price per Unit: $225    This patient requires contact lenses that are medically necessary for either improvement in vision over spectacles, support of the ocular surface, or other therapeutic benefit. These are not cosmetic contact lenses.     Encounter Diagnoses   Name Primary?     Irregular astigmatism of both eyes      Monocular diplopia of both eyes      Dry eyes Yes

## 2023-05-05 ENCOUNTER — TELEPHONE (OUTPATIENT)
Dept: PHYSICAL MEDICINE AND REHAB | Facility: CLINIC | Age: 58
End: 2023-05-05

## 2023-05-05 NOTE — TELEPHONE ENCOUNTER
Writer called patient and left voicemail that we will have to postpone her 5/8 appointment for Botox with Dr. Leo because she has new insurance that started on 5/1/23, and Finance is running a new PA for the Botox.  They received notice that the insurance will not be able to make a determination until the end of next week.  Offered a new appointment on 6/9 at 3:00pm (currently available slot) or 5/19 at 12:45pm (double-book).  Writer asked patient to call us back with her preference so that we may get her appointment rescheduled, and stated that we will contact her to confirm this reschedule.    Maame Dan RN on 5/5/2023 at 9:41 AM

## 2023-05-12 NOTE — TELEPHONE ENCOUNTER
Writer called and left voicemail for patient that we received the authorization for the prior authorization for her visit with Dr. Leo on 5/19.    Maame Dan RN on 5/12/2023 at 2:01 PM

## 2023-05-19 ENCOUNTER — TELEPHONE (OUTPATIENT)
Dept: PHYSICAL MEDICINE AND REHAB | Facility: CLINIC | Age: 58
End: 2023-05-19

## 2023-05-19 ENCOUNTER — OFFICE VISIT (OUTPATIENT)
Dept: PHYSICAL MEDICINE AND REHAB | Facility: CLINIC | Age: 58
End: 2023-05-19
Payer: COMMERCIAL

## 2023-05-19 VITALS — SYSTOLIC BLOOD PRESSURE: 111 MMHG | HEART RATE: 71 BPM | DIASTOLIC BLOOD PRESSURE: 68 MMHG

## 2023-05-19 DIAGNOSIS — S06.0X0D CONCUSSION WITHOUT LOSS OF CONSCIOUSNESS, SUBSEQUENT ENCOUNTER: Primary | ICD-10-CM

## 2023-05-19 DIAGNOSIS — G43.109 MIGRAINE WITH AURA AND WITHOUT STATUS MIGRAINOSUS, NOT INTRACTABLE: ICD-10-CM

## 2023-05-19 DIAGNOSIS — G24.3 CERVICAL DYSTONIA: ICD-10-CM

## 2023-05-19 PROCEDURE — 64615 CHEMODENERV MUSC MIGRAINE: CPT | Performed by: PHYSICAL MEDICINE & REHABILITATION

## 2023-05-19 PROCEDURE — 99215 OFFICE O/P EST HI 40 MIN: CPT | Mod: 25 | Performed by: PHYSICAL MEDICINE & REHABILITATION

## 2023-05-19 PROCEDURE — 95874 GUIDE NERV DESTR NEEDLE EMG: CPT | Performed by: PHYSICAL MEDICINE & REHABILITATION

## 2023-05-19 RX ORDER — DEXAMETHASONE 4 MG/1
1 TABLET ORAL 2 TIMES DAILY
COMMUNITY
Start: 2023-05-06 | End: 2023-08-11

## 2023-05-19 ASSESSMENT — PAIN SCALES - GENERAL: PAINLEVEL: EXTREME PAIN (8)
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
## 2023-05-19 NOTE — TELEPHONE ENCOUNTER
Reason for Call:  Other appointment    Detailed comments: Patient saw Dr. Colon today (5/19/2023) and stopped at the  to schedule a follow up visit. The provider's schedule doesn't appear to be open/accessible for 3 months out. Patient requests to be called to schedule this follow up.    Phone Number Patient can be reached at: Cell number on file:    Telephone Information:   Mobile [phone]       Best Time: anytime    Can we leave a detailed message on this number? YES    In person on 5/19/2023 at 1:16 PM by Bettye Galarza
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
## 2023-05-19 NOTE — PATIENT INSTRUCTIONS
"    GENERAL ADVICE:  ~ Gradually ease back into your usual activities.   ~ Rest as needed to help your symptoms go away.  - Consider pairing 50 minutes of activity with 10 minutes of rest.  ~ Allow yourself more time for activities.  ~ Write things down.  At home, at work, whenever there is something that you should remember, even it is simple.  SCREENS:  ~ Change settings on your phone and computer using the \"Blue Light Filter\" (Night Shift on all  [employer] products)  ~ The goal is making screens more yellow and less blue.    ~ If this is not an option you can download this program, Alignable, to adjust your screen resolution.  ~ GameMix for various filter and font apps  ~ Turn screen brightness down  ~ Increase font size  ~ Limit screen activities including computer, TV and phones.  NECK PAIN:  ~ Ice or Heat are good~  ~ Massage is ok if it doesn't trigger more symptoms~  ~ Gentle stretches and range-of-motion are helpful.  DIZZINESS:  ~ No driving when dizzy.  ~ No biking, climbing heights or ladders if dizzy.  FATIGUE:  ~ Daily exercise is strongly encouraged.  Start with a 10 min walk and increase the time as tolerated until you are walking 30 minutes per day.    ~ Focus on Good sleep hygiene instead of napping . Your goal should be 8 hours of sleep every night.  ~ Try Melatonin 1 hour before bed  ANXIETY OR MOOD SWINGS:  ~ If you are irritable or anxious, take a break in a quiet room.  ~ Try using the free Calm lillian for guided breathing and mindfulness/meditation.  ~ Explore [employer] Oak (https://www.headspace.com) for free and easy-to-use meditation guidance.  LIGHT SENSITIVITIES:  ~ Avoid florescent lighting when possible.  ~Yellow or balta tinted lenses may help reduce computer or night-time road glare.             ~ Amazon has a $10.00 option: [employer] yellow Night Vision.  NOISE SENSITIVITIES:  ~ Try listening to calming sounds such as the \"Calm Lillian\" to help shift your focus off of more irritating " sounds.  ~ Avoid crowded areas at first then slowly introduce yourself to small increments of crowded, noisy areas.  ~ Try High fidelity earplugs used by [employer]. Etymotic ETY-Plugs, can be found on Amazon for $13.00.  DIET:  - In principle incorporate more protein, lots of veggies, some fruit, whole grains.    - Less sweets and saturated fat.   - Mediterranean Diet is an easy-to-follow example.  ~ Drink plenty of water throughout the day (8-10 glasses per day)    PM&R / [facility]   Phone # [phone]  Fax # [phone]        Thank you for allowing us to be a part of your care.        [facility] -   Botox Injection Discharge Instructions  Do not rub or put extended pressure on the injection sites. You may gently touch the sites to remove any excess blood.  Monitor the injection sites for signs and symptoms of infection such as redness, swelling, warmth, fever, chills, or drainage to areas.  You may have soreness at the injection sites for up to 24 hours.  If you are able to use anti-inflammatory medications or Tylenol for pain control, you can take these as directed.  It may take up to 1 month to notice benefit from the 1st treatment  If you do notice relief, botox can be done every 3 months.  It may require insurance authorization everytime.    For questions about insurance coverage, please call the main clinic number and ask to speak with someone about botox coverage.

## 2023-06-07 ENCOUNTER — OFFICE VISIT (OUTPATIENT)
Dept: OPTOMETRY | Facility: CLINIC | Age: 58
End: 2023-06-07
Payer: COMMERCIAL

## 2023-06-07 DIAGNOSIS — H04.123 DRY EYES: ICD-10-CM

## 2023-06-07 DIAGNOSIS — H52.213 IRREGULAR ASTIGMATISM OF BOTH EYES: Primary | ICD-10-CM

## 2023-06-07 DIAGNOSIS — H53.2 MONOCULAR DIPLOPIA OF BOTH EYES: ICD-10-CM

## 2023-06-07 ASSESSMENT — REFRACTION_CURRENTRX
OD_SPHERE: +2.25
OD_DIAMETER: 14.8
OD_BRAND: ZENLENS RC
OS_DIAMETER: 14.8
OS_CYLINDER: -1.00
OS_BRAND: ZENLENS RC
OS_AXIS: 085
OS_SPHERE: +2.50
OD_BASECURVE: 4.1/7.5
OD_CYLINDER: -0.75
OD_AXIS: 100
OS_BASECURVE: 4.1/7.5
OS_ADDL_SPECS: BOSTON XO BLUE, HYDRAPEG

## 2023-06-07 ASSESSMENT — VISUAL ACUITY
VA_OR_OS_CURRENT_RX: 20/20
OD_CC: 20/40
OD_CC+: +1
OS_CC+: -3
VA_OR_OD_CURRENT_RX: 20/25-
VA_OR_OS_CURRENT_RX: 20/20
OS_CC: 20/25
VA_OR_OD_CURRENT_RX: 20/25-2
CORRECTION_TYPE: CONTACTS
METHOD: SNELLEN - LINEAR

## 2023-06-07 ASSESSMENT — EXTERNAL EXAM - RIGHT EYE: OD_EXAM: PTOSIS OD>OS

## 2023-06-07 ASSESSMENT — SLIT LAMP EXAM - LIDS
COMMENTS: MGD
COMMENTS: MGD

## 2023-06-07 NOTE — PROGRESS NOTES
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 Previous soft CL wearer  -Irregular topos - may be caused by decreased blink rate affecting corneal physiology  -BCVA with scleral lens trial today 20/20- both eyes - monocular diplopia significantly improved but not resolved  -Does require FST for elimination of shadows  -Great start in scleral lens, excellent comfort/fit overall. Greatly appreciates lens wear  -Small sphero-cyl and fit adjustments recommended for improved vision  -OTC readers around +2.00 range okay    2.) Dry Eye OU  -Symptomatic despite topical Tx  -May benefit from ocular surface support in scleral lens    Order new lenses and mail direct. F/u 6 months for fit/K recheck, sooner prn if having issues adjusting after lid surgery    I have confirmed the patient's CC, HPI and reviewed Past Medical History, Past Surgical History, Social History, Family History, Problem List, Medication List and agree with Tech note.     Yue Monterroso, OD FAAO TITIS

## 2023-06-12 ENCOUNTER — PRE VISIT (OUTPATIENT)
Dept: OPHTHALMOLOGY | Facility: CLINIC | Age: 58
End: 2023-06-12

## 2023-06-12 ENCOUNTER — OFFICE VISIT (OUTPATIENT)
Dept: OPHTHALMOLOGY | Facility: CLINIC | Age: 58
End: 2023-06-12
Payer: COMMERCIAL

## 2023-06-12 VITALS — WEIGHT: 135 LBS | HEIGHT: 63 IN | BODY MASS INDEX: 23.92 KG/M2

## 2023-06-12 DIAGNOSIS — H02.403 ACQUIRED INVOLUTIONAL PTOSIS OF BOTH EYELIDS: Primary | ICD-10-CM

## 2023-06-12 PROCEDURE — 99243 OFF/OP CNSLTJ NEW/EST LOW 30: CPT | Mod: GC | Performed by: OPHTHALMOLOGY

## 2023-06-12 PROCEDURE — 92082 INTERMEDIATE VISUAL FIELD XM: CPT | Mod: GC | Performed by: OPHTHALMOLOGY

## 2023-06-12 PROCEDURE — 92285 EXTERNAL OCULAR PHOTOGRAPHY: CPT | Mod: GC | Performed by: OPHTHALMOLOGY

## 2023-06-12 ASSESSMENT — MARGIN REFLEX DISTANCE
OD_MRD1: 0.5
OS_MRD1: 1.5

## 2023-06-12 ASSESSMENT — REFRACTION_WEARINGRX
OD_AXIS: 100
OS_ADD: +2.50
OS_SPHERE: +0.50
OD_ADD: +2.50
OD_CYLINDER: +1.25
OS_AXIS: 085
OS_CYLINDER: +1.00
OD_SPHERE: +1.00

## 2023-06-12 ASSESSMENT — VISUAL ACUITY
OS_PH_CC: 20/20
OS_CC+: -2
OS_CC: 20/25
METHOD: SNELLEN - LINEAR
OS_PH_CC+: -2
OD_CC+: +1
OD_CC: 20/50

## 2023-06-12 ASSESSMENT — LAGOPHTHALMOS
OD_LAGOPHTHALMOS: 0
OS_LAGOPHTHALMOS: 0

## 2023-06-12 ASSESSMENT — CONF VISUAL FIELD
OD_SUPERIOR_NASAL_RESTRICTION: 3
OS_SUPERIOR_NASAL_RESTRICTION: 3
OS_SUPERIOR_TEMPORAL_RESTRICTION: 3
OD_SUPERIOR_TEMPORAL_RESTRICTION: 3

## 2023-06-12 ASSESSMENT — TONOMETRY
OD_IOP_MMHG: 8
IOP_METHOD: ICARE
OS_IOP_MMHG: 8

## 2023-06-12 ASSESSMENT — LEVATOR FUNCTION
OS_LEVATOR: 15
OD_LEVATOR: 15

## 2023-06-12 ASSESSMENT — SLIT LAMP EXAM - LIDS: COMMENTS: MGD, PTOSIS

## 2023-06-12 ASSESSMENT — EXTERNAL EXAM - RIGHT EYE: OD_EXAM: NORMAL

## 2023-06-12 ASSESSMENT — EXTERNAL EXAM - LEFT EYE: OS_EXAM: NORMAL

## 2023-06-12 NOTE — LETTER
2023         RE:  :  MRN: Harvey Amador  1965  [id number]     Dear Dr. Yue Monterroso,    Thank you for asking me to see your patient, Harvey Amador, for an oculoplastic   consultation.  My assessment and plan are below.  For further details, please see my attached clinic note.         Assessment & Plan     Harvey Amador is a 57 year old adult with the following diagnoses:   1. Acquired involutional ptosis of both eyelids         Right monocular diagonal diplopia  No lid fluctuation throughout the day  Receives botox for migraines, last one was < 1 month ago  Recently started using scleral lenses    PLAN:  Bilateral upper lids ptosis repair - Nieves's muscle conjunctival resection   Upneeq sample       Again, thank you for allowing me to participate in the care of your patient.      Sincerely,    Nabil Crenshaw MD  Department of Ophthalmology and Visual Neurosciences  [facility]    CC: Yue Monterroso, OD  [address]  [facility] [address]  Via In Basket
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
## 2023-06-12 NOTE — PATIENT INSTRUCTIONS
"Ptosis (Drooping Eyelids)    Eyelid ptosis (pronounced \"liseth-sis\") is a condition in which the upper eyelid droops or sags. It can affect one or both eyes. Sometimes the eyelid droops enough to obstruct the upper field of vision and/or side vision, requiring correction. Ptosis Repair is a surgical procedure that can correct drooping eyelid(s). Depending upon the degree and cause, repair involves either resection (shortening) of a muscle in the eyelid or suspension with a muscle of the brow. Typically, the levator muscle (the major muscle responsible for elevating the upper eyelid) is shortened though an incision made along the natural crease of the lid. Excess skin weighing down the eyelid may also be removed.     Congenital Ptosis  Present from birth, the most common cause of congenital ptosis is the improper development of the levator muscle. Children may need tilt their head back or lift their eyelid with a finger to see. They may also develop amblyopia (\"lazy eye\"), strabismus (eyes that are not properly aligned), astigmatism, or blurred vision. Repair for mild to moderate congenital ptosis is generally performed between ages 3 and 5. Severe visual obstruction may require earlier treatment. Repair is usually performed in an outpatient surgical facility under general anesthesia so the child will not become anxious or restless during the procedure.     Acquired Ptosis  Most commonly due to age-related weakening of the levator muscle, acquired ptosis may also be caused by injury, trauma, or procedures, such as cataract surgery, which can cause weak tendons to stretch. Acquired ptosis may also be the first sign of some diseases, such as myasthenia gravis (a disorder in which the muscles become weak), or Katt's syndrome (a neurological condition that indicates injury to part of the sympathetic nervous system). Ptosis Repair is usually performed in an outpatient surgical facility under anesthesia that induces a " "\"twilight\" state. Sedated consciousness is preferred so that Dr. Crenshaw can accurately adjust the eyelids.     Who Should Perform The Surgery?   When choosing a surgeon to perform ptosis surgery, look for a cosmetic and reconstructive surgeon who specializes in the eyelids, orbit, and tear drain system. Dr. Crenshaw's membership in the American Society of Ophthalmic Plastic and Reconstructive Surgery (ASOPRS) indicates he or she is not only a board certified ophthalmologist who knows the anatomy and structure of the eyelids and orbit, but also has had extensive training in ophthalmic plastic reconstructive and cosmetic surgery.    "
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
## 2023-06-13 ENCOUNTER — TELEPHONE (OUTPATIENT)
Dept: OPHTHALMOLOGY | Facility: CLINIC | Age: 58
End: 2023-06-13
Payer: COMMERCIAL

## 2023-06-13 PROBLEM — H02.403 ACQUIRED INVOLUTIONAL PTOSIS OF BOTH EYELIDS: Status: ACTIVE | Noted: 2023-06-13

## 2023-06-13 NOTE — TELEPHONE ENCOUNTER
Called patient to schedule surgery with Dr. Crenshaw. Patient requesting next available. Informed patient that next available is 7/7/23 at the [facility]. Patient was agreeable to the date.    Approximate arrival time give: Yes, pt was advised that 11:30 am the approx arrival time    Location of surgery: [facility] OR    Pre-Op H&P: Jenny Cunningham    Post-Op Appt Date: 7/21/23    Patient is aware that the pre-op RN will call 2/3 days prior to surgery with arrival time and instructions: Yes    Packet sent out: Yes , on 6/13/23    Additional Comments: Pt was asking if [facility] could call her back. Pt wanted to know if her right eye strabismus surgery w/ Dr Monterroso should be first and how the two surgeries will effect each other.

## 2023-06-13 NOTE — TELEPHONE ENCOUNTER
LVM for patient: Need to clarify questions as Dr. Monterroso does not do strabismus surgery and I don't see that she referred patient to a strabismus surgeon. Left direct dial number and also suggested she send a My Chart message.  Thank you,  Katarina Barr RN RN 2:01 PM 06/13/23

## 2023-07-02 ENCOUNTER — ANESTHESIA EVENT (OUTPATIENT)
Dept: SURGERY | Facility: AMBULATORY SURGERY CENTER | Age: 58
End: 2023-07-02
Payer: COMMERCIAL

## 2023-07-07 ENCOUNTER — HOSPITAL ENCOUNTER (OUTPATIENT)
Facility: AMBULATORY SURGERY CENTER | Age: 58
Discharge: HOME | End: 2023-07-07
Attending: OPHTHALMOLOGY
Payer: COMMERCIAL

## 2023-07-07 ENCOUNTER — ANESTHESIA (OUTPATIENT)
Dept: SURGERY | Facility: AMBULATORY SURGERY CENTER | Age: 58
End: 2023-07-07
Payer: COMMERCIAL

## 2023-07-07 VITALS
WEIGHT: 136 LBS | DIASTOLIC BLOOD PRESSURE: 68 MMHG | RESPIRATION RATE: 16 BRPM | OXYGEN SATURATION: 98 % | HEIGHT: 63 IN | TEMPERATURE: 97.8 F | BODY MASS INDEX: 24.1 KG/M2 | HEART RATE: 65 BPM | SYSTOLIC BLOOD PRESSURE: 100 MMHG

## 2023-07-07 DIAGNOSIS — H02.403 ACQUIRED INVOLUTIONAL PTOSIS OF BOTH EYELIDS: ICD-10-CM

## 2023-07-07 PROCEDURE — 67908 REPAIR EYELID DEFECT: CPT | Mod: LT

## 2023-07-07 PROCEDURE — 67908 REPAIR EYELID DEFECT: CPT | Mod: 50 | Performed by: OPHTHALMOLOGY

## 2023-07-07 RX ORDER — OXYCODONE AND ACETAMINOPHEN 5; 325 MG/1; MG/1
1 TABLET ORAL EVERY 6 HOURS PRN
Qty: 10 TABLET | Refills: 0 | Status: SHIPPED | OUTPATIENT
Start: 2023-07-07 | End: 2023-08-11

## 2023-07-07 RX ORDER — METOPROLOL TARTRATE 1 MG/ML
1-2 INJECTION, SOLUTION INTRAVENOUS EVERY 5 MIN PRN
Status: DISCONTINUED | OUTPATIENT
Start: 2023-07-07 | End: 2023-07-07 | Stop reason: HOSPADM

## 2023-07-07 RX ORDER — ONDANSETRON 2 MG/ML
4 INJECTION INTRAMUSCULAR; INTRAVENOUS EVERY 30 MIN PRN
Status: DISCONTINUED | OUTPATIENT
Start: 2023-07-07 | End: 2023-07-08 | Stop reason: HOSPADM

## 2023-07-07 RX ORDER — ERYTHROMYCIN 5 MG/G
OINTMENT OPHTHALMIC
Qty: 3.5 G | Refills: 0 | Status: SHIPPED | OUTPATIENT
Start: 2023-07-07 | End: 2023-08-11

## 2023-07-07 RX ORDER — ONDANSETRON 2 MG/ML
INJECTION INTRAMUSCULAR; INTRAVENOUS PRN
Status: DISCONTINUED | OUTPATIENT
Start: 2023-07-07 | End: 2023-07-07

## 2023-07-07 RX ORDER — SODIUM CHLORIDE, SODIUM LACTATE, POTASSIUM CHLORIDE, CALCIUM CHLORIDE 600; 310; 30; 20 MG/100ML; MG/100ML; MG/100ML; MG/100ML
INJECTION, SOLUTION INTRAVENOUS CONTINUOUS
Status: DISCONTINUED | OUTPATIENT
Start: 2023-07-07 | End: 2023-07-07 | Stop reason: HOSPADM

## 2023-07-07 RX ORDER — OXYCODONE HYDROCHLORIDE 5 MG/1
5 TABLET ORAL EVERY 4 HOURS PRN
Status: DISCONTINUED | OUTPATIENT
Start: 2023-07-07 | End: 2023-07-08 | Stop reason: HOSPADM

## 2023-07-07 RX ORDER — HYDROMORPHONE HYDROCHLORIDE 1 MG/ML
0.2 INJECTION, SOLUTION INTRAMUSCULAR; INTRAVENOUS; SUBCUTANEOUS EVERY 5 MIN PRN
Status: DISCONTINUED | OUTPATIENT
Start: 2023-07-07 | End: 2023-07-07 | Stop reason: HOSPADM

## 2023-07-07 RX ORDER — PROPOFOL 10 MG/ML
INJECTION, EMULSION INTRAVENOUS PRN
Status: DISCONTINUED | OUTPATIENT
Start: 2023-07-07 | End: 2023-07-07

## 2023-07-07 RX ORDER — LIDOCAINE HYDROCHLORIDE 20 MG/ML
INJECTION, SOLUTION INFILTRATION; PERINEURAL PRN
Status: DISCONTINUED | OUTPATIENT
Start: 2023-07-07 | End: 2023-07-07

## 2023-07-07 RX ORDER — ONDANSETRON 4 MG/1
4 TABLET, ORALLY DISINTEGRATING ORAL EVERY 30 MIN PRN
Status: DISCONTINUED | OUTPATIENT
Start: 2023-07-07 | End: 2023-07-07 | Stop reason: HOSPADM

## 2023-07-07 RX ORDER — HYDRALAZINE HYDROCHLORIDE 20 MG/ML
2.5-5 INJECTION INTRAMUSCULAR; INTRAVENOUS EVERY 10 MIN PRN
Status: DISCONTINUED | OUTPATIENT
Start: 2023-07-07 | End: 2023-07-07 | Stop reason: HOSPADM

## 2023-07-07 RX ORDER — LIDOCAINE HYDROCHLORIDE AND EPINEPHRINE 10; 10 MG/ML; UG/ML
INJECTION, SOLUTION INFILTRATION; PERINEURAL PRN
Status: DISCONTINUED | OUTPATIENT
Start: 2023-07-07 | End: 2023-07-07 | Stop reason: HOSPADM

## 2023-07-07 RX ORDER — ONDANSETRON 4 MG/1
4 TABLET, ORALLY DISINTEGRATING ORAL EVERY 30 MIN PRN
Status: DISCONTINUED | OUTPATIENT
Start: 2023-07-07 | End: 2023-07-08 | Stop reason: HOSPADM

## 2023-07-07 RX ORDER — ERYTHROMYCIN 5 MG/G
OINTMENT OPHTHALMIC PRN
Status: DISCONTINUED | OUTPATIENT
Start: 2023-07-07 | End: 2023-07-07 | Stop reason: HOSPADM

## 2023-07-07 RX ORDER — FENTANYL CITRATE 50 UG/ML
50 INJECTION, SOLUTION INTRAMUSCULAR; INTRAVENOUS EVERY 5 MIN PRN
Status: DISCONTINUED | OUTPATIENT
Start: 2023-07-07 | End: 2023-07-07 | Stop reason: HOSPADM

## 2023-07-07 RX ORDER — TETRACAINE HYDROCHLORIDE 5 MG/ML
SOLUTION OPHTHALMIC PRN
Status: DISCONTINUED | OUTPATIENT
Start: 2023-07-07 | End: 2023-07-07 | Stop reason: HOSPADM

## 2023-07-07 RX ORDER — ONDANSETRON 4 MG/1
4 TABLET, ORALLY DISINTEGRATING ORAL EVERY 8 HOURS PRN
Qty: 10 TABLET | Refills: 0 | Status: SHIPPED | OUTPATIENT
Start: 2023-07-07 | End: 2023-07-10

## 2023-07-07 RX ORDER — FENTANYL CITRATE 50 UG/ML
25 INJECTION, SOLUTION INTRAMUSCULAR; INTRAVENOUS EVERY 5 MIN PRN
Status: DISCONTINUED | OUTPATIENT
Start: 2023-07-07 | End: 2023-07-07 | Stop reason: HOSPADM

## 2023-07-07 RX ORDER — LIDOCAINE 40 MG/G
CREAM TOPICAL
Status: DISCONTINUED | OUTPATIENT
Start: 2023-07-07 | End: 2023-07-07 | Stop reason: HOSPADM

## 2023-07-07 RX ORDER — FENTANYL CITRATE 50 UG/ML
25 INJECTION, SOLUTION INTRAMUSCULAR; INTRAVENOUS
Status: DISCONTINUED | OUTPATIENT
Start: 2023-07-07 | End: 2023-07-08 | Stop reason: HOSPADM

## 2023-07-07 RX ORDER — PROPOFOL 10 MG/ML
INJECTION, EMULSION INTRAVENOUS CONTINUOUS PRN
Status: DISCONTINUED | OUTPATIENT
Start: 2023-07-07 | End: 2023-07-07

## 2023-07-07 RX ORDER — FENTANYL CITRATE 50 UG/ML
INJECTION, SOLUTION INTRAMUSCULAR; INTRAVENOUS PRN
Status: DISCONTINUED | OUTPATIENT
Start: 2023-07-07 | End: 2023-07-07

## 2023-07-07 RX ORDER — ONDANSETRON 2 MG/ML
4 INJECTION INTRAMUSCULAR; INTRAVENOUS EVERY 30 MIN PRN
Status: DISCONTINUED | OUTPATIENT
Start: 2023-07-07 | End: 2023-07-07 | Stop reason: HOSPADM

## 2023-07-07 RX ORDER — TOBRAMYCIN AND DEXAMETHASONE 3; 1 MG/ML; MG/ML
1 SUSPENSION/ DROPS OPHTHALMIC 4 TIMES DAILY
Qty: 5 ML | Refills: 0 | Status: SHIPPED | OUTPATIENT
Start: 2023-07-07 | End: 2023-07-17

## 2023-07-07 RX ORDER — ACETAMINOPHEN 325 MG/1
975 TABLET ORAL ONCE
Status: DISCONTINUED | OUTPATIENT
Start: 2023-07-07 | End: 2023-07-07 | Stop reason: HOSPADM

## 2023-07-07 RX ORDER — HYDROMORPHONE HYDROCHLORIDE 1 MG/ML
0.4 INJECTION, SOLUTION INTRAMUSCULAR; INTRAVENOUS; SUBCUTANEOUS EVERY 5 MIN PRN
Status: DISCONTINUED | OUTPATIENT
Start: 2023-07-07 | End: 2023-07-07 | Stop reason: HOSPADM

## 2023-07-07 RX ORDER — SODIUM CHLORIDE, SODIUM LACTATE, POTASSIUM CHLORIDE, CALCIUM CHLORIDE 600; 310; 30; 20 MG/100ML; MG/100ML; MG/100ML; MG/100ML
INJECTION, SOLUTION INTRAVENOUS CONTINUOUS PRN
Status: DISCONTINUED | OUTPATIENT
Start: 2023-07-07 | End: 2023-07-07

## 2023-07-07 RX ORDER — OXYCODONE HYDROCHLORIDE 5 MG/1
10 TABLET ORAL EVERY 4 HOURS PRN
Status: DISCONTINUED | OUTPATIENT
Start: 2023-07-07 | End: 2023-07-08 | Stop reason: HOSPADM

## 2023-07-07 RX ADMIN — SODIUM CHLORIDE, SODIUM LACTATE, POTASSIUM CHLORIDE, CALCIUM CHLORIDE: 600; 310; 30; 20 INJECTION, SOLUTION INTRAVENOUS at 11:27

## 2023-07-07 RX ADMIN — PROPOFOL 30 MG: 10 INJECTION, EMULSION INTRAVENOUS at 11:47

## 2023-07-07 RX ADMIN — PROPOFOL 50 MG: 10 INJECTION, EMULSION INTRAVENOUS at 11:48

## 2023-07-07 RX ADMIN — ONDANSETRON 4 MG: 2 INJECTION INTRAMUSCULAR; INTRAVENOUS at 12:03

## 2023-07-07 RX ADMIN — PROPOFOL 50 MCG/KG/MIN: 10 INJECTION, EMULSION INTRAVENOUS at 12:03

## 2023-07-07 RX ADMIN — FENTANYL CITRATE 25 MCG: 50 INJECTION, SOLUTION INTRAMUSCULAR; INTRAVENOUS at 11:44

## 2023-07-07 RX ADMIN — LIDOCAINE HYDROCHLORIDE 60 MG: 20 INJECTION, SOLUTION INFILTRATION; PERINEURAL at 11:46

## 2023-07-07 NOTE — ANESTHESIA CARE TRANSFER NOTE
Patient: Harvey Amador    Procedure: Procedure(s):  REPAIR, PTOSIS -BILATERAL UPPER LIDS       Diagnosis: Acquired involutional ptosis of both eyelids [H02.403]  Diagnosis Additional Information: No value filed.    Anesthesia Type:   MAC     Note:    Oropharynx: oropharynx clear of all foreign objects and spontaneously breathing  Level of Consciousness: awake  Oxygen Supplementation: room air    Independent Airway: airway patency satisfactory and stable  Dentition: dentition unchanged  Vital Signs Stable: post-procedure vital signs reviewed and stable  Report to RN Given: handoff report given  Patient transferred to: Phase II    Handoff Report: Identifed the Patient, Identified the Reponsible Provider, Reviewed the pertinent medical history, Discussed the surgical course, Reviewed Intra-OP anesthesia mangement and issues during anesthesia, Set expectations for post-procedure period and Allowed opportunity for questions and acknowledgement of understanding      Vitals:  Vitals Value Taken Time   BP     Temp     Pulse     Resp     SpO2         Electronically Signed By: LYLE Mcmahan CRNA  July 7, 2023  12:21 PM

## 2023-07-07 NOTE — DISCHARGE INSTRUCTIONS
Post-operative Instructions    Ophthalmic Plastic and Reconstructive Surgery  Nabil Crenshaw M.D.  Tressa Weeks M.D.    All instructions apply to the operated eye(s) or eyelid(s).  Wound care and personal care  ? If a patch or bandage has been placed, please leave this in place until seen by your physician. Ensure that the bandage does not get wet when you take a shower.     Tape on the lower lids for lower cosmetic blepharoplasty can get wet and it is safe to shower with the tape on the lower eyelids. The tape will be removed at one week. If it falls off, do not replace.     ? Apply ice compresses 15 minutes on 15 minutes off while awake for 2 days, then switch to warm water compresses 4 times a day until seen by your physician. For warm packs you can place a cup of dry uncooked rice in a clean cotton sock. Then place sock in microwave 30 seconds to one minute. Next place the warm sock into a plastic bag and wrap the bag with clean warm wet washcloth and place over operated eye.    ? You may shower or wash your hair the day after surgery. Do not bathe or go swimming for 1 week to prevent contamination of your wounds.  ? Do not apply make-up to the eyes or eyelids for 2 weeks after surgery.  ? Expect some swelling, bruising, black eye (even into the lower eyelids and cheeks). Also expect serum caking, crusting and discharge from the eye and/or incisions. A small amount of surface bleeding is normal for the first 48 hours. Swelling will typically worsen over the first 48-72 hours.  ? Your eye(s) and eyelid(s) may be painful and tender. This is normal after surgery.  Use the pain medication as prescribed. If your pain does not improve despite the  medication, contact the office.    Contact information and follow-up  ? Return to the Eye Clinic for a follow-up appointment with your physician as  scheduled. If no appointment has been scheduled, call [phone] for an  appointment with Dr. Crenshaw within 1 to  2 weeks from your date of surgery.  ? For severe pain, bleeding, or loss of vision, call the Eye Clinic at [phone].  After hours or on weekends and holidays, call [phone] and ask to speak with  the ophthalmologist on call.    Activity restrictions and driving  ? Avoid heavy lifting, bending, exercise or strenuous activity for 1 week after surgery.  You may resume other activities and return to work as tolerated.  ? You may not resume driving until have you stopped using narcotic pain medications(such as Norco, Percocet, Tylenol #3).    Medications  ? Restart all your regular home medications and eye drops. If you take Plavix or  Aspirin on a regular basis, wait for 3 days after your surgery before restarting these  in order to decrease the risk of bleeding complications.  ? Avoid aspirin and aspirin-like medications (Motrin, Aleve, Ibuprofen, Maxine-  [place] etc) for 5 days to reduce the risk of bleeding. You may take Tylenol  (acetaminophen) for pain.  ? In addition to your home medications, take the following post-operative medications as prescribed by your physician.    ? Apply antibiotic ointment to all sutures three times a day, and into the operated eye(s) at night.  ? Instill eye drops 3 times a day until finished.  ? Take 1 to 2 pain pills as needed for pain up to every 4 hours.    ? WARNING: All the prescription pain medications listed above contain Tylenol  (acetaminophen). You must not take more than 4,000 mg of acetaminophen per  24-hour period. This is equivalent to 6 tablets of Darvocet, 8 tablets of Vicodin, or  12 tablets of Norco, Percocet or Tylenol #3. If you take other over-the-counter medications  containing acetaminophen, you must take the amount of acetaminophen into  account and reduce the number of prescribed pain pills accordingly.  ? The prescribed medications may make you drowsy. You must not drive a car,  operate heavy machinery or drink alcohol while taking them.  ? The  prescribed pain medications may cause constipation and nausea. Take them  with some food to prevent a stomach upset. If you continue to experience nausea,  call your physician.      [facility]  Home Care Following Anesthesia  For 24 hours after surgery:  Get plenty of rest.  A responsible adult must stay with you for at least 24 hours after you leave the surgery center.  Do not drive or use heavy equipment.  If you have weakness or tingling, don't drive or use heavy equipment until this feeling goes away.   Do not drink alcohol.   Avoid strenuous or risky activities.  Ask for help when climbing stairs.  You may feel lightheaded.  IF so, sit for a few minutes before standing.  Have someone help you get up.   If you have nausea (feel sick to your stomach): Drink only clear liquids such as apple juice, ginger ale, broth or 7-Up.  Rest may also help.  Be sure to drink enough fluids.  Move to a regular diet as you feel able.   You may have a slight fever.  Call the doctor if your fever is over 100 F (37.7 C) (taken under the tongue) or lasts longer than 24 hours.  You may have a dry mouth, a sore throat, muscle aches or trouble sleeping. These should go away after 24 hours.  Do not make important or legal decisions.   It is recommended to avoid smoking.               Tips for taking pain medications  To get the best pain relief possible, remember these points:  Take pain medications as directed, before pain becomes severe.  Pain medication can upset your stomach: taking it with food may help.  Constipation is a common side effect of pain medication. Drink plenty of  fluids.  Eat foods high in fiber. Take a stool softener if recommended by your doctor or pharmacist.  Do not drink alcohol, drive or operate machinery while taking pain medications.  Ask about other ways to control pain, such as with heat, ice or relaxation.    Tylenol/Acetaminophen Consumption    If you feel your pain  relief is insufficient, you may take Tylenol/Acetaminophen in addition to your narcotic pain medication.   Be careful not to exceed 4,000 mg of Tylenol/Acetaminophen in a 24 hour period from all sources.  If you are taking extra strength Tylenol/acetaminophen (500 mg), the maximum dose is 8 tablets in 24 hours.  If you are taking regular strength acetaminophen (325 mg), the maximum dose is 12 tablets in 24 hours.    Call a doctor for any of the following:  Signs of infection (fever, growing tenderness at the surgery site, a large amount of drainage or bleeding, severe pain, foul-smelling drainage, redness, swelling).  It has been over 8 to 10 hours since surgery and you are still not able to urinate (pass water).  Headache for over 24 hours.  Numbness, tingling or weakness the day after surgery (if you had spinal anesthesia).  Signs of Covid-19 infection (temperature over 100 degrees, shortness of breath, cough, loss of taste/smell, generalized body aches, persistent headache, chills, sore throat, nausea/vomiting/diarrhea)  Your doctor is:  Dr. Nabil Crenshaw, Ophthalmology: [phone]                    Or dial [phone] and ask for the resident on call for:  Ophthalmology  For emergency care, call the:  [facility]:  [phone] (TTY for hearing impaired: [phone])

## 2023-07-07 NOTE — OP NOTE
PREOPERATIVE DIAGNOSIS: Bilateral upper eyelid ptosis.   POSTOPERATIVE DIAGNOSIS:  Bilateral upper eyelid ptosis.   PROCEDURE:Bilateral upper eyelid ptosis repair by Nieves's muscle conjunctival resection.   SURGEON: Nabil Crenshaw MD  ANESTHESIA: Monitored with local infiltration of 1% lidocaine with epinephrine [id number].   COMPLICATIONS: None.   ESTIMATED BLOOD LOSS: Less than 5 cc.   HISTORY: Harvey Amador presented with upper lid ptosis interfering with the superior visual field and activities of daily living. After the risks, benefits and alternatives to the proposed procedure were explained, informed consent was obtained.   PROCEDURE: The patient was brought to the operating room and placed supine on the operating table. IV sedation was given. The bilateral upper eyelid was infiltrated with local anesthetic and  was prepped and draped in the typical sterile ophthalmic fashion. Atttention was directed to the right  side.  A 4-0 silk suture was placed through the eyelid margin and the eyelid everted over a Desmarres retractor. A 6-0 silk suture was then threaded through the conjunctiva and Nieves's muscle  4.75 mm from the superior tarsal border. These sutures were used to elevate the conjunctiva and Nieves's muscle which was gently peeled from the underlying levator muscle. The Putterman clamp was used and clamped over the elevated tissues. A 6-0 plain gut suture was run in a horizontal mattress fashion 1 mm below the clamp from lateral to medial, then medial to lateral. The elevated tissues were excised with a 15 blade. The sutures were then externalized and tied in the lid crease. The 4-0 silk suture was removed. The lid was reverted to its normal position and ophthalmic antibiotic ointment placed in the eye. Attention was directed to the left side where the same procedure was performed with the exception that the suture was run 4.25 mm from the superior tarsal border on this side . The patient tolerated  the procedure well and left the operating room in stable condition.   MARISSA CAMPBELL MD

## 2023-07-07 NOTE — ANESTHESIA POSTPROCEDURE EVALUATION
Patient: Harvey Labeau    Procedure: Procedure(s):  REPAIR, PTOSIS -BILATERAL UPPER LIDS       Anesthesia Type:  MAC    Note:  Disposition: Outpatient   Postop Pain Control: Uneventful            Sign Out: Well controlled pain   PONV: No   Neuro/Psych: Uneventful            Sign Out: Acceptable/Baseline neuro status   Airway/Respiratory: Uneventful            Sign Out: Acceptable/Baseline resp. status   CV/Hemodynamics: Uneventful            Sign Out: Acceptable CV status; No obvious hypovolemia; No obvious fluid overload   Other NRE: NONE   DID A NON-ROUTINE EVENT OCCUR? No           Last vitals:  Vitals Value Taken Time   /68 07/07/23 1235   Temp 36.6  C (97.8  F) 07/07/23 1220   Pulse 65 07/07/23 1235   Resp 16 07/07/23 1235   SpO2 98 % 07/07/23 1235       Electronically Signed By: Fab Wright MD  July 7, 2023  2:07 PM

## 2023-07-07 NOTE — BRIEF OP NOTE
[facility]    Brief Operative Note    Pre-operative diagnosis: Acquired involutional ptosis of both eyelids [H02.403]  Post-operative diagnosis Same as pre-operative diagnosis    Procedure: Procedure(s):  REPAIR, PTOSIS -BILATERAL UPPER LIDS  Surgeon: Surgeon(s) and Role:     * Nabil Crenshaw MD - Primary  Anesthesia: MAC with Local   Estimated Blood Loss: Minimal    Drains: None  Specimens: * No specimens in log *  Findings:   None.  Complications: None.  Implants: * No implants in log *
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
## 2023-07-08 ENCOUNTER — TELEPHONE (OUTPATIENT)
Dept: OPHTHALMOLOGY | Facility: CLINIC | Age: 58
End: 2023-07-08
Payer: COMMERCIAL

## 2023-07-08 NOTE — TELEPHONE ENCOUNTER
POD1  A telephone call was made to Harvey Amador to make sure the post operative course has been uneventful and that all questions were answered. There was no answer and a telephone message was left.    Nabil Crenshaw MD

## 2023-07-21 ENCOUNTER — OFFICE VISIT (OUTPATIENT)
Dept: OPHTHALMOLOGY | Facility: CLINIC | Age: 58
End: 2023-07-21
Payer: COMMERCIAL

## 2023-07-21 DIAGNOSIS — H02.403 ACQUIRED INVOLUTIONAL PTOSIS OF BOTH EYELIDS: Primary | ICD-10-CM

## 2023-07-21 PROCEDURE — 99024 POSTOP FOLLOW-UP VISIT: CPT | Mod: GC | Performed by: OPHTHALMOLOGY

## 2023-07-21 ASSESSMENT — REFRACTION_WEARINGRX
OD_SPHERE: +1.00
OS_AXIS: 085
OS_CYLINDER: +1.00
OS_ADD: +2.50
OD_AXIS: 100
OS_SPHERE: +0.50
OD_ADD: +2.50
OD_CYLINDER: +1.25

## 2023-07-21 ASSESSMENT — TONOMETRY
OD_IOP_MMHG: 09
IOP_METHOD: ICARE
OS_IOP_MMHG: 09

## 2023-07-21 ASSESSMENT — VISUAL ACUITY
OD_SC+: -1
OS_SC+: -
OS_SC: 20/30
OD_SC: 20/100
METHOD: SNELLEN - LINEAR

## 2023-07-21 NOTE — PROGRESS NOTES
Chief Complaints and History of Present Illnesses   Patient presents with     Post Op (Ophthalmology) Both Eyes     Pt here for POW#2 s/p BUL Ptosis repair by Nieves's muscle conjunctival resection. DOS 07/07/2023.     Chief Complaint(s) and History of Present Illness(es)     Post Op (Ophthalmology) Both Eyes    In both eyes.  Associated symptoms include Negative for eye pain and   headache. Additional comments: Pt here for POW#2 s/p BUL Ptosis repair by   Nieves's muscle conjunctival resection. DOS 07/07/2023.           Comments    Pt has no concerns. Pt thought she was having a blepharoplasty. She does   also note eyes still feel heavy. Pt no longer using shanell.    Livia Alarcon, COA on 7/21/2023 at 9:46 AM         Assessment & Plan     Harvey Amador is a 57 year old adult with the following diagnoses:   1. Acquired involutional ptosis of both eyelids      Harvey Amador is 2 weeks status post MMCR each eye   The incision(s) are healing well.  The lid(s)  are  in excellent position.    I have recommended:  * Continue antibiotic ointment or bland lubricating ointment (eg vaseline or aquaphor) to the incision site BID.  * Massage along the incision BID.  * Warm soaks QID until all edema and ecchymoses resolve  * Return to clinic in 2-3 months  * See [facility] for strab    Yamilex Copeland MD  Oculoplastic Surgery Fellow    Attending Physician Attestation:  I have seen and examined this patient.  I have confirmed and edited as necessary the chief complaint(s), history of present illness, review of systems, relevant history, and examination findings as documented by others.  I have personally reviewed the relevant tests, images, and reports as documented above.  I have confirmed and edited as necessary the assessment and plan and agree with this note.    - Nabil Crenshaw MD 9:59 AM 7/21/2023

## 2023-07-21 NOTE — Clinical Note
Harvey is an awesome patient. Who I did ptosis on. She has a history of TBI and has right esotropia and wants eval and treatment. Can you set up eval? Thanks  Marciano

## 2023-07-21 NOTE — NURSING NOTE
Chief Complaints and History of Present Illnesses   Patient presents with     Post Op (Ophthalmology) Both Eyes     Pt here for POW#2 s/p BUL Ptosis repair by Nieves's muscle conjunctival resection. DOS 07/07/2023.     Chief Complaint(s) and History of Present Illness(es)     Post Op (Ophthalmology) Both Eyes            Laterality: both eyes    Associated symptoms: Negative for eye pain and headache    Comments: Pt here for POW#2 s/p BUL Ptosis repair by Nieves's muscle conjunctival resection. DOS 07/07/2023.          Comments    Pt has no concerns. Pt thought she was having a blepharoplasty. She does also note eyes still feel heavy. Pt no longer using shanell.    Livia Alarcon, COA on 7/21/2023 at 9:46 AM

## 2023-07-24 ENCOUNTER — TELEPHONE (OUTPATIENT)
Dept: OPHTHALMOLOGY | Facility: CLINIC | Age: 58
End: 2023-07-24
Payer: COMMERCIAL

## 2023-07-24 NOTE — TELEPHONE ENCOUNTER
LVM for patient to call back to schedule next available with Shante Morales on 7/24/2023 at 1:10 PM

## 2023-08-11 ENCOUNTER — OFFICE VISIT (OUTPATIENT)
Dept: PHYSICAL MEDICINE AND REHAB | Facility: CLINIC | Age: 58
End: 2023-08-11
Payer: COMMERCIAL

## 2023-08-11 VITALS — SYSTOLIC BLOOD PRESSURE: 117 MMHG | DIASTOLIC BLOOD PRESSURE: 69 MMHG | HEART RATE: 67 BPM

## 2023-08-11 DIAGNOSIS — G24.3 CERVICAL DYSTONIA: ICD-10-CM

## 2023-08-11 DIAGNOSIS — G43.109 MIGRAINE WITH AURA AND WITHOUT STATUS MIGRAINOSUS, NOT INTRACTABLE: ICD-10-CM

## 2023-08-11 DIAGNOSIS — R68.89 LIGHT SENSITIVITY: ICD-10-CM

## 2023-08-11 DIAGNOSIS — S06.0X0D CONCUSSION WITHOUT LOSS OF CONSCIOUSNESS, SUBSEQUENT ENCOUNTER: Primary | ICD-10-CM

## 2023-08-11 PROCEDURE — 99213 OFFICE O/P EST LOW 20 MIN: CPT | Performed by: PHYSICAL MEDICINE & REHABILITATION

## 2023-08-11 ASSESSMENT — PAIN SCALES - GENERAL: PAINLEVEL: MODERATE PAIN (5)
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
## 2023-08-11 NOTE — PATIENT INSTRUCTIONS
[facility]  Botox Injection Discharge Instructions    Do not rub or put extended pressure on the injection sites. You may gently touch the sites to remove any excess blood.  Monitor the injection sites for signs and symptoms of infection such as redness, swelling, warmth, fever, chills, or drainage to areas.  You may have soreness at the injection sites for up to 24 hours.  If you are able to use anti-inflammatory medications or Tylenol for pain control, you can take these as directed.  It may take up to 1 month to notice benefit from the 1st treatment  If you do notice relief, botox can be done every 3 months.  It may require insurance authorization everytime.    For questions about insurance coverage, please call the main clinic number and ask to speak with someone about botox coverage.     Pain Clinic phone number during work hours (Monday through Friday 8 am-4:30 pm) at [phone] or the Provider Line after hours at [phone]:
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
## 2023-08-11 NOTE — NURSING NOTE
Pre-procedure Intake  If YES to any questions or NO to having a   Please complete laminated checklist and leave on the computer keyboard for Provider, verbally inform provider if able.    Are you taking any any blood thinners such as Coumadin, Warfarin, Jantoven, Pradaxa Xarelto, Eliquis, Edoxaban, Enoxaparin, Lovenox, Heparin, Arixtra, Fondaparinux, or Fragmin? OR Antiplatelet medication such as Plavix, Brilinta, or Effient?   No   If yes, when did you take your last dose?     Do you take aspirin?  No  If cervical procedure, have you held aspirin for 6 days?   NA    Do you have any allergies to contrast dye, iodine, steroid and/or numbing medications?  NO    Are you currently taking antibiotics or have an active infection?  NO    Have you had a fever/elevated temperature within the past week? NO    Are you currently taking oral steroids? NO        Notify provider and RNs if systolic BP >170, diastolic BP >100, P >100 or O2 sats < 90%

## 2023-09-15 ENCOUNTER — OFFICE VISIT (OUTPATIENT)
Dept: OPHTHALMOLOGY | Facility: CLINIC | Age: 58
End: 2023-09-15
Payer: COMMERCIAL

## 2023-09-15 DIAGNOSIS — H02.403 ACQUIRED INVOLUTIONAL PTOSIS OF BOTH EYELIDS: ICD-10-CM

## 2023-09-15 DIAGNOSIS — Z98.890 POSTOPERATIVE EYE STATE: Primary | ICD-10-CM

## 2023-09-15 PROCEDURE — 99024 POSTOP FOLLOW-UP VISIT: CPT | Performed by: OPHTHALMOLOGY

## 2023-09-15 ASSESSMENT — MARGIN REFLEX DISTANCE
OD_MRD1: 4
OS_MRD1: 4

## 2023-09-15 ASSESSMENT — SLIT LAMP EXAM - LIDS
COMMENTS: NORMAL
COMMENTS: NORMAL

## 2023-09-15 ASSESSMENT — VISUAL ACUITY
OS_SC+: -2
METHOD: SNELLEN - LINEAR
OS_SC: 20/40
OD_SC+: +1
OD_SC: 20/150

## 2023-09-15 ASSESSMENT — CONF VISUAL FIELD
OD_INFERIOR_NASAL_RESTRICTION: 0
OD_NORMAL: 1
OS_NORMAL: 1
OD_SUPERIOR_TEMPORAL_RESTRICTION: 0
OS_SUPERIOR_NASAL_RESTRICTION: 0
OD_SUPERIOR_NASAL_RESTRICTION: 0
OS_INFERIOR_TEMPORAL_RESTRICTION: 0
METHOD: COUNTING FINGERS
OS_INFERIOR_NASAL_RESTRICTION: 0
OS_SUPERIOR_TEMPORAL_RESTRICTION: 0
OD_INFERIOR_TEMPORAL_RESTRICTION: 0

## 2023-09-15 ASSESSMENT — EXTERNAL EXAM - LEFT EYE: OS_EXAM: NORMAL

## 2023-09-15 ASSESSMENT — EXTERNAL EXAM - RIGHT EYE: OD_EXAM: NORMAL

## 2023-09-15 NOTE — PROGRESS NOTES
Chief Complaints and History of Present Illnesses   Patient presents with    Post Op (Ophthalmology) Both Eyes     Postop bilateral upper eyelid ptosis.     Chief Complaint(s) and History of Present Illness(es)     Post Op (Ophthalmology) Both Eyes    In both eyes.  Associated symptoms include dryness and photophobia.    Negative for eye pain, redness and tearing.  Pain was noted as 0/10.   Additional comments: Postop bilateral upper eyelid ptosis.           Comments    Eye meds: At's as needed  Vision getting worse per patient.  Light sensitivity.    MYA Hutchinson 9/15/2023 8:18 AM         Assessment & Plan     Harvey Amador is a 58 year old adult with the following diagnoses:   1. Postoperative eye state    2. Acquired involutional ptosis of both eyelids      S/p Nieves's muscle conjunctival resection 7/7/23   Healed    PLAN   Return to clinic  as needed  [place] [facility] [place]                  Attending Physician Attestation:  Complete documentation of historical and exam elements from today's encounter can be found in the full encounter summary report (not reduplicated in this progress note).  I personally obtained the chief complaint(s) and history of present illness.  I confirmed and edited as necessary the review of systems, past medical/surgical history, family history, social history, and examination findings as documented by others; and I examined the patient myself.  I personally reviewed the relevant tests, images, and reports as documented above.  I formulated and edited as necessary the assessment and plan and discussed the findings and management plan with the patient and family.   -Nabil Crenshaw MD  8:29 AM 9/15/2023

## 2023-09-15 NOTE — NURSING NOTE
Chief Complaints and History of Present Illnesses   Patient presents with    Post Op (Ophthalmology) Both Eyes     Postop bilateral upper eyelid ptosis.      Chief Complaint(s) and History of Present Illness(es)       Post Op (Ophthalmology) Both Eyes              Laterality: both eyes    Associated symptoms: dryness and photophobia.  Negative for eye pain, redness and tearing    Pain scale: 0/10    Comments: Postop bilateral upper eyelid ptosis.              Comments    Eye meds: At's as needed  Vision getting worse per patient.  Light sensitivity.    MYA Hutchinson 9/15/2023 8:18 AM

## 2023-09-21 ENCOUNTER — TELEPHONE (OUTPATIENT)
Dept: OPHTHALMOLOGY | Facility: CLINIC | Age: 58
End: 2023-09-21

## 2023-09-21 ENCOUNTER — OFFICE VISIT (OUTPATIENT)
Dept: OPHTHALMOLOGY | Facility: CLINIC | Age: 58
End: 2023-09-21
Payer: COMMERCIAL

## 2023-09-21 DIAGNOSIS — H52.213 IRREGULAR ASTIGMATISM OF BOTH EYES: ICD-10-CM

## 2023-09-21 DIAGNOSIS — H04.129 DRY EYE: Primary | ICD-10-CM

## 2023-09-21 PROCEDURE — 99203 OFFICE O/P NEW LOW 30 MIN: CPT | Performed by: OPTOMETRIST

## 2023-09-21 ASSESSMENT — REFRACTION_MANIFEST
OS_AXIS: 106
OD_AXIS: 004
OS_SPHERE: +1.25
OS_ADD: +2.50
OD_SPHERE: +1.75
OD_ADD: +2.50
OS_CYLINDER: +0.50
OD_CYLINDER: +0.25

## 2023-09-21 ASSESSMENT — CONF VISUAL FIELD
OD_INFERIOR_NASAL_RESTRICTION: 0
OS_INFERIOR_NASAL_RESTRICTION: 0
OS_SUPERIOR_TEMPORAL_RESTRICTION: 0
OD_SUPERIOR_TEMPORAL_RESTRICTION: 0
OD_NORMAL: 1
OD_SUPERIOR_NASAL_RESTRICTION: 0
OS_NORMAL: 1
OS_SUPERIOR_NASAL_RESTRICTION: 0
OD_INFERIOR_TEMPORAL_RESTRICTION: 0
METHOD: COUNTING FINGERS
OS_INFERIOR_TEMPORAL_RESTRICTION: 0

## 2023-09-21 ASSESSMENT — VISUAL ACUITY
OS_SC+: -2
OS_PH_SC: 20/25
OD_SC: 20/100
OD_PH_SC+: +1
METHOD: SNELLEN - LINEAR
OS_PH_SC+: -2
OS_SC: 20/60
OD_PH_SC: 20/30

## 2023-09-21 ASSESSMENT — EXTERNAL EXAM - LEFT EYE: OS_EXAM: NORMAL

## 2023-09-21 ASSESSMENT — TONOMETRY
OS_IOP_MMHG: 13
OD_IOP_MMHG: 13
IOP_METHOD: ICARE

## 2023-09-21 ASSESSMENT — REFRACTION_CURRENTRX
OS_AXIS: 085
OD_CYLINDER: -1.50
OD_SPHERE: +2.50
OD_BRAND: ZENLENS RC
OS_CYLINDER: -1.75
OS_BASECURVE: 4.0/7.5
OS_SPHERE: +2.50
OD_BASECURVE: 4.0/7.5
OD_AXIS: 100
OS_BRAND: ZENLENS RC
OS_DIAMETER: 14.8
OD_DIAMETER: 14.8
OS_ADDL_SPECS: BOSTON XO BLUE, HYDRAPEG

## 2023-09-21 ASSESSMENT — EXTERNAL EXAM - RIGHT EYE: OD_EXAM: NORMAL

## 2023-09-21 ASSESSMENT — SLIT LAMP EXAM - LIDS
COMMENTS: MGD
COMMENTS: MGD

## 2023-09-21 NOTE — PROGRESS NOTES
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 Previous soft CL wearer  -Irregular topos - may be caused by decreased blink rate affecting corneal physiology  -BCVA with scleral lens 20/20- both eyes - monocular diplopia significantly improved but not resolved  -Does require FST for elimination of shadows  -No overrefraction. Largely good fit - no changes recommended at this time  -OTC readers around +2.00 range okay    2.) Dry Eye OU  -Symptomatic despite topical Tx  -May benefit from ocular surface support in scleral lens  -Continue AT. Add Pataday for allergies. Trial filling bowl of lens with Celluvisc to reduce fogging  -Consider plugs or Restasis etc if symptoms do not improve    She is seeing Dr. Domingo next week for strab eval. Monocular diplopia is as improved as it can be in scleral lens. Follow-up 1 year with me, sooner prn with new concerns    I have confirmed the patient's CC, HPI and reviewed Past Medical History, Past Surgical History, Social History, Family History, Problem List, Medication List and agree with Tech note.     Yue Monterroso, OD FAAO [facility]SL[facility]

## 2023-09-21 NOTE — TELEPHONE ENCOUNTER
Patient returned VM regarding scheduling a 1 year Annual anytime afte 9/21/24 with . Scheduled patient accordingly and patient also mentioned she forget to get a glasses RX while in clinic today 9/21/23. Patient will come back at 12:00pm today 9/21/23 for a Refraction with  as offered.-Per Patient

## 2023-09-21 NOTE — PATIENT INSTRUCTIONS
START Pataday (Olopatadine) once a day in both eyes    CONTINUE artificial tears (any of the ones listed below)    START using Refresh Celluvisc inside the bowl of the scleral lenses before you insert them. You may use a few drops and then fill the rest with saline, or you may choose to fill the whole bowl with Celluvisc.     ------------    CONSIDER: punctal plugs (dissolvable) or medicated eyedrops to treat dry eye if symptoms worsen    ------------    Artificial tears: (Water-like consistency. Can be used during the daytime)  -Refresh Plus  -Refresh Relieva  -Systane Ultra  -Systane Hydration  -Biotrue Hydration Boost  (Notes: Anything in a bottle has preservatives and can be used up to 4x/day. Preservative free vials can be used as much as necessary)

## 2023-09-21 NOTE — TELEPHONE ENCOUNTER
LVM for patient regarding scheduling a 1 year Annual anytime afte 9/21/24 with . Provided direct number for scheduling options.
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
## 2023-09-27 ENCOUNTER — OFFICE VISIT (OUTPATIENT)
Dept: OPHTHALMOLOGY | Facility: CLINIC | Age: 58
End: 2023-09-27
Attending: OPHTHALMOLOGY
Payer: COMMERCIAL

## 2023-09-27 DIAGNOSIS — H50.21 HYPOTROPIA OF RIGHT EYE: ICD-10-CM

## 2023-09-27 DIAGNOSIS — H53.2 DOUBLE VISION: Primary | ICD-10-CM

## 2023-09-27 DIAGNOSIS — H53.10 SUBJECTIVE VISUAL DISTURBANCE: ICD-10-CM

## 2023-09-27 DIAGNOSIS — H50.21 HYPERTROPIA OF RIGHT EYE: ICD-10-CM

## 2023-09-27 PROCEDURE — G0463 HOSPITAL OUTPT CLINIC VISIT: HCPCS | Performed by: OPHTHALMOLOGY

## 2023-09-27 PROCEDURE — 92060 SENSORIMOTOR EXAMINATION: CPT | Mod: 26 | Performed by: OPHTHALMOLOGY

## 2023-09-27 PROCEDURE — 99214 OFFICE O/P EST MOD 30 MIN: CPT | Performed by: OPHTHALMOLOGY

## 2023-09-27 PROCEDURE — 92060 SENSORIMOTOR EXAMINATION: CPT | Performed by: OPHTHALMOLOGY

## 2023-09-27 ASSESSMENT — EXTERNAL EXAM - RIGHT EYE: OD_EXAM: NORMAL

## 2023-09-27 ASSESSMENT — CONF VISUAL FIELD: METHOD: COUNTING FINGERS

## 2023-09-27 ASSESSMENT — EXTERNAL EXAM - LEFT EYE: OS_EXAM: NORMAL

## 2023-09-27 ASSESSMENT — VISUAL ACUITY
OD_CC: 20/30
OS_CC: 20/20
METHOD: SNELLEN - LINEAR
CORRECTION_TYPE: CONTACTS
OD_PH_CC+: -3
OD_PH_CC: 20/20
OS_CC+: -2

## 2023-09-27 ASSESSMENT — SLIT LAMP EXAM - LIDS
COMMENTS: MGD
COMMENTS: MGD

## 2023-09-27 ASSESSMENT — TONOMETRY
OD_IOP_MMHG: 12
IOP_METHOD: ICARE
OS_IOP_MMHG: 12
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
## 2023-10-02 ENCOUNTER — OFFICE VISIT (OUTPATIENT)
Dept: PHYSICAL MEDICINE AND REHAB | Facility: CLINIC | Age: 58
End: 2023-10-02
Payer: COMMERCIAL

## 2023-10-02 VITALS — OXYGEN SATURATION: 98 % | HEART RATE: 67 BPM | SYSTOLIC BLOOD PRESSURE: 137 MMHG | DIASTOLIC BLOOD PRESSURE: 76 MMHG

## 2023-10-02 DIAGNOSIS — G43.109 MIGRAINE WITH AURA AND WITHOUT STATUS MIGRAINOSUS, NOT INTRACTABLE: ICD-10-CM

## 2023-10-02 DIAGNOSIS — G24.3 CERVICAL DYSTONIA: Primary | ICD-10-CM

## 2023-10-02 PROCEDURE — 64615 CHEMODENERV MUSC MIGRAINE: CPT | Performed by: PHYSICAL MEDICINE & REHABILITATION

## 2023-10-02 PROCEDURE — 95874 GUIDE NERV DESTR NEEDLE EMG: CPT | Performed by: PHYSICAL MEDICINE & REHABILITATION

## 2023-10-02 PROCEDURE — 99215 OFFICE O/P EST HI 40 MIN: CPT | Mod: 25 | Performed by: PHYSICAL MEDICINE & REHABILITATION

## 2023-10-02 NOTE — PATIENT INSTRUCTIONS
[facility]  Botox Injection Discharge Instructions    Do not rub or put extended pressure on the injection sites. You may gently touch the sites to remove any excess blood.  Monitor the injection sites for signs and symptoms of infection such as redness, swelling, warmth, fever, chills, or drainage to areas.  You may have soreness at the injection sites for up to 24 hours.  If you are able to use anti-inflammatory medications or Tylenol for pain control, you can take these as directed.  It may take up to 1 month to notice benefit from the 1st treatment  If you do notice relief, botox can be done every 3 months.  It may require insurance authorization everytime.    For questions about insurance coverage, please call the main clinic number and ask to speak with someone about botox coverage.     Pain Clinic phone number during work hours (Monday through Friday 8 am-4:30 pm) at [phone] or the Provider Line after hours at [phone]:
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
## 2023-11-02 ENCOUNTER — TELEPHONE (OUTPATIENT)
Dept: OPTOMETRY | Facility: CLINIC | Age: 58
End: 2023-11-02

## 2023-11-02 ENCOUNTER — OFFICE VISIT (OUTPATIENT)
Dept: OPTOMETRY | Facility: CLINIC | Age: 58
End: 2023-11-02
Payer: COMMERCIAL

## 2023-11-02 DIAGNOSIS — H04.123 DRY EYES: Primary | ICD-10-CM

## 2023-11-02 DIAGNOSIS — H52.213 IRREGULAR ASTIGMATISM OF BOTH EYES: ICD-10-CM

## 2023-11-02 DIAGNOSIS — H53.2 MONOCULAR DIPLOPIA OF BOTH EYES: ICD-10-CM

## 2023-11-02 NOTE — TELEPHONE ENCOUNTER
[facility] Call Center    Phone Message    May a detailed message be left on voicemail: yes     Reason for Call: Symptoms or Concerns     If patient has red-flag symptoms, warm transfer to triage line    Current symptom or concern: Right eye feels infected, having an reaction to the lens or the lens is torn.    Symptoms have been present for:  2 day(s)    Has patient previously been seen for this? No    By :     Date:     Are there any new or worsening symptoms? Yes:     Action Taken: Message routed to:  [facility] Optometry    Travel Screening: Not Applicable

## 2023-11-02 NOTE — TELEPHONE ENCOUNTER
Pt aware of Dr. Monterroso's recommendation (visible in the UserZoom message I sent)    Pt will be sending images soon.    Pt feels would like new scleral lens if lens has defect or not.    --will update Dr. Monterroso about request ahead of the UserZoom message response from pt.    Minesh Cook RN 12:31 PM 11/02/23

## 2023-11-02 NOTE — TELEPHONE ENCOUNTER
Spoke to pt at 1040    Right eye irritation, teary and inflammation for about two days.    Pt states when takes contact out the eye feels better.    Pt took contact out today and still having eye pain and some redness-- some improvement.    Pt feels vision the same when contact out as in past--blurred secondary to tearing when contact lens in.    Pt using scleral lenses.    Pt had infection in past about a year ago last time--pt wasn't wearing contacts at that time.    Pt states eye eye did feel fine this morning--no symptoms and then placed contact lens in and symptoms started    Pt unsure if has defect on lens vs infection vs other.    I will review plan with Dr. Monterroso and call back.    Minesh Cook RN 10:47 AM 11/02/23

## 2023-11-03 ASSESSMENT — REFRACTION_CURRENTRX
OS_SPHERE: +2.50
OS_BASECURVE: 4.0/7.5
OD_BASECURVE: 4.0/7.5
OS_CYLINDER: -1.75
OS_BRAND: ZENLENS RC
OD_SPHERE: +2.50
OD_BRAND: ZENLENS RC
OS_ADDL_SPECS: BOSTON XO BLUE, HYDRAPEG
OS_DIAMETER: 14.8
OD_CYLINDER: -1.50
OS_AXIS: 085
OD_AXIS: 100
OD_DIAMETER: 14.8

## 2023-11-03 NOTE — PROGRESS NOTES
No office visit. Pt requested duplicate right lens. Ordered and mailed direct    Contact Lens Billing  V-Code:  - Scleral Cover Shell  Final Contact Lens Rx         Brand Base Curve Diameter Sphere Cylinder Axis Lens Addl. Specs    Right Zenlens RC 4.0/7.5 14.8 +2.50 -1.50 100 2 flat H x 1 steep V [place] XO clear, HydraPEG    Left Zenlens RC 4.0/7.5 14.8 +2.50 -1.75 085 2 flat H x 1 steep V [place] XO blue, HydraPEG           # of units: 1 right lens  Price per Unit: $225    This patient requires contact lenses that are medically necessary for either improvement in vision over spectacles, support of the ocular surface, or other therapeutic benefit. These are not cosmetic contact lenses.     Encounter Diagnoses   Name Primary?    Dry eyes Yes    Monocular diplopia of both eyes     Irregular astigmatism of both eyes         Date of last eye exam: 9/27/23

## 2023-11-05 ENCOUNTER — HEALTH MAINTENANCE LETTER (OUTPATIENT)
Age: 58
End: 2023-11-05

## 2024-03-03 ENCOUNTER — APPOINTMENT (OUTPATIENT)
Dept: CT IMAGING | Facility: CLINIC | Age: 59
End: 2024-03-03
Attending: STUDENT IN AN ORGANIZED HEALTH CARE EDUCATION/TRAINING PROGRAM
Payer: COMMERCIAL

## 2024-03-03 ENCOUNTER — HOSPITAL ENCOUNTER (EMERGENCY)
Facility: CLINIC | Age: 59
Discharge: HOME | End: 2024-03-03
Attending: STUDENT IN AN ORGANIZED HEALTH CARE EDUCATION/TRAINING PROGRAM | Admitting: STUDENT IN AN ORGANIZED HEALTH CARE EDUCATION/TRAINING PROGRAM
Payer: COMMERCIAL

## 2024-03-03 VITALS — SYSTOLIC BLOOD PRESSURE: 130 MMHG | OXYGEN SATURATION: 95 % | HEART RATE: 78 BPM | DIASTOLIC BLOOD PRESSURE: 74 MMHG

## 2024-03-03 DIAGNOSIS — T14.8XXA SKIN ABRASION: ICD-10-CM

## 2024-03-03 DIAGNOSIS — S09.90XA INJURY OF HEAD, INITIAL ENCOUNTER: ICD-10-CM
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
## 2024-03-03 PROCEDURE — 99284 EMERGENCY DEPT VISIT MOD MDM: CPT | Mod: 25

## 2024-03-03 PROCEDURE — 90471 IMMUNIZATION ADMIN: CPT | Performed by: STUDENT IN AN ORGANIZED HEALTH CARE EDUCATION/TRAINING PROGRAM

## 2024-03-03 PROCEDURE — 70450 CT HEAD/BRAIN W/O DYE: CPT

## 2024-03-03 PROCEDURE — 250N000011 HC RX IP 250 OP 636: Performed by: STUDENT IN AN ORGANIZED HEALTH CARE EDUCATION/TRAINING PROGRAM

## 2024-03-03 PROCEDURE — 90715 TDAP VACCINE 7 YRS/> IM: CPT | Performed by: STUDENT IN AN ORGANIZED HEALTH CARE EDUCATION/TRAINING PROGRAM

## 2024-03-03 PROCEDURE — 250N000013 HC RX MED GY IP 250 OP 250 PS 637: Performed by: STUDENT IN AN ORGANIZED HEALTH CARE EDUCATION/TRAINING PROGRAM

## 2024-03-03 RX ORDER — IBUPROFEN 600 MG/1
600 TABLET, FILM COATED ORAL ONCE
Status: COMPLETED | OUTPATIENT
Start: 2024-03-03 | End: 2024-03-03

## 2024-03-03 RX ORDER — ACETAMINOPHEN 325 MG/1
975 TABLET ORAL ONCE
Status: COMPLETED | OUTPATIENT
Start: 2024-03-03 | End: 2024-03-03

## 2024-03-03 RX ADMIN — CLOSTRIDIUM TETANI TOXOID ANTIGEN (FORMALDEHYDE INACTIVATED), CORYNEBACTERIUM DIPHTHERIAE TOXOID ANTIGEN (FORMALDEHYDE INACTIVATED), BORDETELLA PERTUSSIS TOXOID ANTIGEN (GLUTARALDEHYDE INACTIVATED), BORDETELLA PERTUSSIS FILAMENTOUS HEMAGGLUTININ ANTIGEN (FORMALDEHYDE INACTIVATED), BORDETELLA PERTUSSIS PERTACTIN ANTIGEN, AND BORDETELLA PERTUSSIS FIMBRIAE 2/3 ANTIGEN 0.5 ML: 5; 2; 2.5; 5; 3; 5 INJECTION, SUSPENSION INTRAMUSCULAR at 18:20

## 2024-03-03 RX ADMIN — IBUPROFEN 600 MG: 600 TABLET ORAL at 18:23

## 2024-03-03 RX ADMIN — ACETAMINOPHEN 975 MG: 325 TABLET, FILM COATED ORAL at 18:23

## 2024-03-03 ASSESSMENT — ACTIVITIES OF DAILY LIVING (ADL)
ADLS_ACUITY_SCORE: 35
ADLS_ACUITY_SCORE: 35
ADLS_ACUITY_SCORE: 33
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
## 2024-03-04 NOTE — DISCHARGE INSTRUCTIONS
Continue ibuprofen and Tylenol for any ongoing head pain that you experience.  Thankfully imaging is all negative and reassuring.  Follow-up with your primary care provider as needed.  If you develop any worsening symptoms such as increased lethargy, confusion, seizures, severe nausea and vomiting, return to ER.

## 2024-03-05 ENCOUNTER — PRE VISIT (OUTPATIENT)
Dept: ORTHOPEDICS | Facility: CLINIC | Age: 59
End: 2024-03-05

## 2024-03-05 DIAGNOSIS — M25.569 KNEE PAIN: Primary | ICD-10-CM

## 2024-03-05 NOTE — TELEPHONE ENCOUNTER
DIAGNOSIS: Right Knee Pain / No Images / ER VISIT /    APPOINTMENT DATE: 3.5.24   NOTES STATUS DETAILS   DISCHARGE REPORT from the ER Internal 3.3.24  Cristian Hoang   MEDICATION LIST Internal

## 2024-03-12 ENCOUNTER — TELEPHONE (OUTPATIENT)
Dept: OPTOMETRY | Facility: CLINIC | Age: 59
End: 2024-03-12
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
## 2024-04-03 ENCOUNTER — HOSPITAL ENCOUNTER (OUTPATIENT)
Facility: CLINIC | Age: 59
End: 2024-04-03
Attending: ORTHOPAEDIC SURGERY | Admitting: ORTHOPAEDIC SURGERY
Payer: COMMERCIAL

## 2024-05-01 ENCOUNTER — HOSPITAL ENCOUNTER (EMERGENCY)
Facility: CLINIC | Age: 59
Discharge: LEFT BEFORE DISCHARGE | End: 2024-05-01
Admitting: EMERGENCY MEDICINE
Payer: COMMERCIAL

## 2024-05-01 VITALS
OXYGEN SATURATION: 97 % | BODY MASS INDEX: 23.92 KG/M2 | WEIGHT: 135 LBS | HEART RATE: 103 BPM | SYSTOLIC BLOOD PRESSURE: 126 MMHG | DIASTOLIC BLOOD PRESSURE: 58 MMHG | TEMPERATURE: 97.6 F | RESPIRATION RATE: 18 BRPM

## 2024-05-01 PROCEDURE — 99281 EMR DPT VST MAYX REQ PHY/QHP: CPT

## 2024-05-01 ASSESSMENT — ACTIVITIES OF DAILY LIVING (ADL)
ADLS_ACUITY_SCORE: 35
ADLS_ACUITY_SCORE: 35

## 2024-05-01 ASSESSMENT — COLUMBIA-SUICIDE SEVERITY RATING SCALE - C-SSRS
1. IN THE PAST MONTH, HAVE YOU WISHED YOU WERE DEAD OR WISHED YOU COULD GO TO SLEEP AND NOT WAKE UP?: YES
6. HAVE YOU EVER DONE ANYTHING, STARTED TO DO ANYTHING, OR PREPARED TO DO ANYTHING TO END YOUR LIFE?: NO
2. HAVE YOU ACTUALLY HAD ANY THOUGHTS OF KILLING YOURSELF IN THE PAST MONTH?: NO
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
## 2024-05-02 ENCOUNTER — APPOINTMENT (OUTPATIENT)
Dept: CT IMAGING | Facility: CLINIC | Age: 59
End: 2024-05-02
Attending: EMERGENCY MEDICINE
Payer: COMMERCIAL

## 2024-05-02 ENCOUNTER — APPOINTMENT (OUTPATIENT)
Dept: GENERAL RADIOLOGY | Facility: CLINIC | Age: 59
End: 2024-05-02
Attending: EMERGENCY MEDICINE
Payer: COMMERCIAL

## 2024-05-02 ENCOUNTER — HOSPITAL ENCOUNTER (EMERGENCY)
Facility: CLINIC | Age: 59
Discharge: HOME | End: 2024-05-02
Attending: EMERGENCY MEDICINE | Admitting: EMERGENCY MEDICINE
Payer: COMMERCIAL

## 2024-05-02 VITALS
OXYGEN SATURATION: 98 % | DIASTOLIC BLOOD PRESSURE: 76 MMHG | SYSTOLIC BLOOD PRESSURE: 130 MMHG | TEMPERATURE: 98.4 F | HEART RATE: 66 BPM | RESPIRATION RATE: 16 BRPM

## 2024-05-02 DIAGNOSIS — S29.012A UPPER BACK STRAIN, INITIAL ENCOUNTER: ICD-10-CM

## 2024-05-02 DIAGNOSIS — S09.90XA INJURY OF HEAD, INITIAL ENCOUNTER: ICD-10-CM

## 2024-05-02 DIAGNOSIS — S16.1XXA STRAIN OF NECK MUSCLE, INITIAL ENCOUNTER: ICD-10-CM

## 2024-05-02 PROCEDURE — 71046 X-RAY EXAM CHEST 2 VIEWS: CPT

## 2024-05-02 PROCEDURE — 70450 CT HEAD/BRAIN W/O DYE: CPT

## 2024-05-02 PROCEDURE — 72072 X-RAY EXAM THORAC SPINE 3VWS: CPT

## 2024-05-02 PROCEDURE — 99284 EMERGENCY DEPT VISIT MOD MDM: CPT | Mod: 25

## 2024-05-02 PROCEDURE — 72125 CT NECK SPINE W/O DYE: CPT

## 2024-05-02 ASSESSMENT — COLUMBIA-SUICIDE SEVERITY RATING SCALE - C-SSRS
5. HAVE YOU STARTED TO WORK OUT OR WORKED OUT THE DETAILS OF HOW TO KILL YOURSELF? DO YOU INTEND TO CARRY OUT THIS PLAN?: NO
3. HAVE YOU BEEN THINKING ABOUT HOW YOU MIGHT KILL YOURSELF?: NO
6. HAVE YOU EVER DONE ANYTHING, STARTED TO DO ANYTHING, OR PREPARED TO DO ANYTHING TO END YOUR LIFE?: NO
4. HAVE YOU HAD THESE THOUGHTS AND HAD SOME INTENTION OF ACTING ON THEM?: NO
2. HAVE YOU ACTUALLY HAD ANY THOUGHTS OF KILLING YOURSELF IN THE PAST MONTH?: YES
1. IN THE PAST MONTH, HAVE YOU WISHED YOU WERE DEAD OR WISHED YOU COULD GO TO SLEEP AND NOT WAKE UP?: YES

## 2024-05-02 ASSESSMENT — ACTIVITIES OF DAILY LIVING (ADL)
ADLS_ACUITY_SCORE: 35
ADLS_ACUITY_SCORE: 35
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
## 2024-05-20 RX ORDER — CEFAZOLIN SODIUM/WATER 2 G/20 ML
2 SYRINGE (ML) INTRAVENOUS SEE ADMIN INSTRUCTIONS
Status: CANCELLED | OUTPATIENT
Start: 2024-05-20

## 2024-05-20 RX ORDER — CEFAZOLIN SODIUM/WATER 2 G/20 ML
2 SYRINGE (ML) INTRAVENOUS
Status: CANCELLED | OUTPATIENT
Start: 2024-05-20

## 2024-05-20 RX ORDER — TRANEXAMIC ACID 650 MG/1
1950 TABLET ORAL ONCE
Status: CANCELLED | OUTPATIENT
Start: 2024-05-20 | End: 2024-05-20

## 2024-06-17 ENCOUNTER — TELEPHONE (OUTPATIENT)
Dept: OPTOMETRY | Facility: CLINIC | Age: 59
End: 2024-06-17

## 2024-06-17 NOTE — TELEPHONE ENCOUNTER
[facility] Call Center    Phone Message    May a detailed message be left on voicemail: yes     Reason for Call: Appointment Intake    Referring Provider Name: Kathleen Turner   Diagnosis and/or Symptoms:     Injury of head, initial encounter [S09.90XA]  Strain of neck muscle, initial encounter [S16.1XXA]  Upper back strain, initial encounter     Other:  Patient states she was told to schedule an appointment regarding this referral with Dr. Monterroso.  She is asking for a call back at [phone].  Writer unable to schedule due to protocol.  Thank you!     Action Taken: Message routed to:  [facility] Eye     Travel Screening: Not Applicable     Date of Service:
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
## 2024-06-20 NOTE — TELEPHONE ENCOUNTER
Called and LVM     Ok to schedule with Dr. Jacobsen in a New Neuro -Meningioma     Sent a [employer] message     Summer Boyce Communication Facilitator on 6/20/2024 at 9:00 AM
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
## 2024-07-01 ENCOUNTER — OFFICE VISIT (OUTPATIENT)
Dept: OPHTHALMOLOGY | Facility: CLINIC | Age: 59
End: 2024-07-01
Payer: COMMERCIAL

## 2024-07-01 DIAGNOSIS — H53.10 SUBJECTIVE VISUAL DISTURBANCE: Primary | ICD-10-CM

## 2024-07-01 PROCEDURE — 99213 OFFICE O/P EST LOW 20 MIN: CPT

## 2024-07-01 PROCEDURE — 92083 EXTENDED VISUAL FIELD XM: CPT

## 2024-07-01 PROCEDURE — G0463 HOSPITAL OUTPT CLINIC VISIT: HCPCS

## 2024-07-01 PROCEDURE — 92133 CPTRZD OPH DX IMG PST SGM ON: CPT

## 2024-07-01 RX ORDER — LITHIUM CARBONATE 300 MG/1
300 TABLET, FILM COATED, EXTENDED RELEASE ORAL AT BEDTIME
COMMUNITY
Start: 2024-05-29

## 2024-07-01 ASSESSMENT — SLIT LAMP EXAM - LIDS
COMMENTS: MGD
COMMENTS: MGD

## 2024-07-01 ASSESSMENT — REFRACTION_WEARINGRX
OS_CYLINDER: +0.50
OS_SPHERE: +1.25
SPECS_TYPE: PAL
OD_SPHERE: +1.75
OD_CYLINDER: SPHERE
OS_AXIS: 106
OD_CYLINDER: +0.25
OS_SPHERE: +2.00
OD_SPHERE: +2.00
OS_CYLINDER: SPHERE
OD_ADD: +2.50
OD_AXIS: 004
SPECS_TYPE: SVL
OS_ADD: +2.50

## 2024-07-01 ASSESSMENT — VISUAL ACUITY
OD_CC: 20/30
OD_PH_CC+: +1
METHOD: SNELLEN - LINEAR
OS_PH_CC: 20/25
OS_CC: 20/30
OD_PH_CC: 20/30
OS_CC+: -1
OS_PH_CC+: -3
CORRECTION_TYPE: GLASSES

## 2024-07-01 ASSESSMENT — CONF VISUAL FIELD
OS_SUPERIOR_NASAL_RESTRICTION: 0
OD_SUPERIOR_TEMPORAL_RESTRICTION: 0
OS_INFERIOR_NASAL_RESTRICTION: 0
OS_INFERIOR_TEMPORAL_RESTRICTION: 0
METHOD: COUNTING FINGERS
OS_NORMAL: 1
OD_SUPERIOR_NASAL_RESTRICTION: 0
OD_INFERIOR_NASAL_RESTRICTION: 0
OD_NORMAL: 1
OD_INFERIOR_TEMPORAL_RESTRICTION: 0
OS_SUPERIOR_TEMPORAL_RESTRICTION: 0

## 2024-07-01 ASSESSMENT — TONOMETRY
OS_IOP_MMHG: 6
IOP_METHOD: ICARE
OD_IOP_MMHG: 6

## 2024-07-01 ASSESSMENT — CUP TO DISC RATIO
OS_RATIO: 0.25
OD_RATIO: 0.25

## 2024-07-01 ASSESSMENT — EXTERNAL EXAM - RIGHT EYE: OD_EXAM: NORMAL

## 2024-07-01 ASSESSMENT — EXTERNAL EXAM - LEFT EYE: OS_EXAM: NORMAL
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
## 2024-07-24 ENCOUNTER — TELEPHONE (OUTPATIENT)
Dept: OPHTHALMOLOGY | Facility: CLINIC | Age: 59
End: 2024-07-24
Payer: COMMERCIAL

## 2024-07-24 NOTE — TELEPHONE ENCOUNTER
Spoke with patient regarding rescheduling needed as provider is out of clinic.   Rescheduled patient accordingly and [facility]Per Patient

## 2024-08-27 ENCOUNTER — APPOINTMENT (OUTPATIENT)
Dept: GENERAL RADIOLOGY | Facility: CLINIC | Age: 59
End: 2024-08-27
Attending: STUDENT IN AN ORGANIZED HEALTH CARE EDUCATION/TRAINING PROGRAM
Payer: COMMERCIAL

## 2024-08-27 ENCOUNTER — HOSPITAL ENCOUNTER (EMERGENCY)
Facility: CLINIC | Age: 59
Discharge: HOME | End: 2024-08-27
Attending: STUDENT IN AN ORGANIZED HEALTH CARE EDUCATION/TRAINING PROGRAM | Admitting: STUDENT IN AN ORGANIZED HEALTH CARE EDUCATION/TRAINING PROGRAM
Payer: COMMERCIAL

## 2024-08-27 VITALS
DIASTOLIC BLOOD PRESSURE: 75 MMHG | HEIGHT: 63 IN | SYSTOLIC BLOOD PRESSURE: 130 MMHG | BODY MASS INDEX: 23.92 KG/M2 | WEIGHT: 135 LBS | TEMPERATURE: 97.5 F | RESPIRATION RATE: 16 BRPM | HEART RATE: 64 BPM | OXYGEN SATURATION: 98 %

## 2024-08-27 DIAGNOSIS — S61.217A LACERATION OF LEFT LITTLE FINGER WITHOUT FOREIGN BODY WITHOUT DAMAGE TO NAIL, INITIAL ENCOUNTER: ICD-10-CM

## 2024-08-27 PROCEDURE — 12001 RPR S/N/AX/GEN/TRNK 2.5CM/<: CPT

## 2024-08-27 PROCEDURE — 99283 EMERGENCY DEPT VISIT LOW MDM: CPT

## 2024-08-27 PROCEDURE — 73140 X-RAY EXAM OF FINGER(S): CPT | Mod: LT

## 2024-08-27 PROCEDURE — 250N000013 HC RX MED GY IP 250 OP 250 PS 637: Performed by: STUDENT IN AN ORGANIZED HEALTH CARE EDUCATION/TRAINING PROGRAM

## 2024-08-27 RX ORDER — ACETAMINOPHEN 500 MG
1000 TABLET ORAL ONCE
Status: COMPLETED | OUTPATIENT
Start: 2024-08-27 | End: 2024-08-27

## 2024-08-27 RX ADMIN — ACETAMINOPHEN 1000 MG: 500 TABLET ORAL at 14:17

## 2024-08-27 ASSESSMENT — ACTIVITIES OF DAILY LIVING (ADL)
ADLS_ACUITY_SCORE: 35

## 2024-08-27 NOTE — DISCHARGE INSTRUCTIONS
Keep your cut dry until 8/28.  After that wash it once a day.  Keep it covered with a Band-Aid.  You can use plain Vaseline under the Band-Aid if you want to.  Stitches need to be removed on 9/2 or 9/3.  If you notice redness, worsening pain, thick pus coming out of your finger, please seek medical attention

## 2024-08-27 NOTE — ED TRIAGE NOTES
Pt cut the inner side of her pinky with a serrated knife while cutting fruit in the kitchen today . Bleeding appears to be under control .  ( Pt also has TBI and new dx of brain tumor - just guera . ) pt has a care dog withher as well.  Alert and orientated . vss     Triage Assessment (Adult)       Row Name 08/27/24 1320          Triage Assessment    Airway WDL WDL        Respiratory WDL    Respiratory WDL WDL        Skin Circulation/Temperature WDL    Skin Circulation/Temperature WDL WDL        Cardiac WDL    Cardiac WDL WDL        Peripheral/Neurovascular WDL    Peripheral Neurovascular WDL WDL        Cognitive/Neuro/Behavioral WDL    Cognitive/Neuro/Behavioral WDL WDL

## 2024-08-27 NOTE — ED PROVIDER NOTES
"  Emergency Department Note      History of Present Illness     Chief Complaint   Laceration    HPI   Harvey Amador is a right-handed 59 year old adult who presents for a left fifth finger laceration. Patient was cutting a large cabbage with a serrated knife today when she accidentally sliced her left pinky. She did not clean the wound at home. Patient is neurovascularly in-tact and is still able to range the finger. She is not anticoagulated. Patient's last tetanus vaccination was within the last year.     Independent Historian   None    Review of External Notes   None    Past Medical History     Medical History and Problem List  PTSD  Suicidal ideation  Depression  Major depressive disorder  Acquired involutional ptosis of both eyelids  Arthritis  Asthma  Bipolar 1 disorder  Thyroid disorder  TBI     Medications  Esgic  Estrace  Minipress  Zoloft     Surgical History   Shattered clavicle repair  Repair ptosis  Strabismus surgery  Tonsillectomy  Bunionectomy     Physical Exam     Patient Vitals for the past 24 hrs:   BP Temp Temp src Pulse Resp SpO2 Height Weight   08/27/24 1318 130/75 97.5  F (36.4  C) Temporal 64 16 98 % 1.6 m (5' 3\") 61.2 kg (135 lb)     Physical Exam  General:  Alert, interactive  Cardiovascular:  Normal rate  Lungs:  No respiratory distress, no accessory muscle use  Abdominal: No distension   Neuro:  Moving all 4 extremities  MSK: No gross deformities  Laceration through distal medial left pinky finger.  Able to flex and extend.  No significant bony tenderness.  Sensation intact distally.  Venous oozing.  Skin:  Warm, dry      Diagnostics     Lab Results   Labs Ordered and Resulted from Time of ED Arrival to Time of ED Departure - No data to display    Imaging   Fingers XR, 2-3 views, left   Final Result   IMPRESSION:   No displaced fracture. Soft tissue swelling along the left fifth   finger in the region the proximal interphalangeal joint. No underlying   osseous injury or retained " radiopaque foreign body. Likely prior   trapeziectomy with proximal position of the thumb. Polyarticular wrist   and hand osteoarthritis.      PERRY GREENE MD            SYSTEM ID:  [id number]        EKG   None     Independent Interpretation   None    ED Course      Medications Administered   Medications   acetaminophen (TYLENOL) tablet 1,000 mg (1,000 mg Oral $Given 8/27/24 1413)       Procedures     Laceration Repair      Procedure: Laceration Repair    Indication: Laceration    Consent: Verbal    Tetanus status reviewed    Location:  L fifth (pinky) finger    Length: 1 cm    Preparation: Irrigation with Sterile Saline.    Anesthesia/Sedation: Lidocaine - 1%      Treatment/Exploration: Wound explored, no foreign bodies found     Closure: The wound was closed with one layer. Skin/superficial layer was closed with 4 x 4-0 Polypropylene (prolene)  using Interrupted sutures.     Patient Status: The patient tolerated the procedure well: Yes. There were no complications.       Discussion of Management   None    ED Course   ED Course as of 08/27/24 1940   Tue Aug 27, 2024   1341 I evaluated the patient, obtained history, and performed a physical exam as detailed above.    1405 TDAP 03/03/2024   1554 Digital block     Additional Documentation  None    Medical Decision Making / Diagnosis     CMS Diagnoses: None    MIPS   None    [facility]   Harvey Amador is a 59 year old adult who presents with laceration as above.  Tetanus is up-to-date.  No neurovascular compromise.  No bony involvement noted on x-ray.  Extensively irrigated.  Had digital block though due to incomplete anesthesia, small amount of local lidocaine instilled.  Laceration repaired as above without complication.  Will use nonabsorbable sutures which need to be removed in 7 days.  Wound care instructions provided.  Patient discharged home in improved condition.    Disposition   The patient was discharged.     Diagnosis     ICD-10-CM    1. Laceration of left  little finger without foreign body without damage to nail, initial encounter  S61.217A            Discharge Medications   Discharge Medication List as of 8/27/2024  5:10 PM        Scribe Disclosure:  I, Lucy Starkey, am serving as a scribe at 1:49 PM on 8/27/2024 to document services personally performed by Yoana Damian,  based on my observations and the provider's statements to me.        Yoana Damian DO  08/27/24 1940     Quality 431: Preventive Care And Screening: Unhealthy Alcohol Use - Screening: Patient not identified as an unhealthy alcohol user when screened for unhealthy alcohol use using a systematic screening method Quality 130: Documentation Of Current Medications In The Medical Record: Current Medications Documented Quality 226: Preventive Care And Screening: Tobacco Use: Screening And Cessation Intervention: Patient screened for tobacco use and is an ex/non-smoker Detail Level: Detailed

## 2024-09-19 ENCOUNTER — TELEPHONE (OUTPATIENT)
Dept: OTOLARYNGOLOGY | Facility: CLINIC | Age: 59
End: 2024-09-19
Payer: COMMERCIAL

## 2024-09-26 NOTE — TELEPHONE ENCOUNTER
FUTURE VISIT INFORMATION      FUTURE VISIT INFORMATION:  Date: 11/27/24  Time: 8:20am  Location: [facility]  REFERRAL INFORMATION:  Referring provider:    Referring providers clinic:    Reason for visit/diagnosis  Per pt ear pain, possible Tinnitus  [facility] verified     RECORDS REQUESTED FROM:       Clinic name Comments Records Status Imaging Status   [facility]  5/2/24- ED Kathleen Scruggs MD  Epic     Imaging  5/2/24- CT Head  [facility] PACS

## 2024-11-01 ENCOUNTER — OFFICE VISIT (OUTPATIENT)
Dept: OPHTHALMOLOGY | Facility: CLINIC | Age: 59
End: 2024-11-01
Payer: COMMERCIAL

## 2024-11-01 DIAGNOSIS — H52.03 HYPEROPIA WITH PRESBYOPIA OF BOTH EYES: ICD-10-CM

## 2024-11-01 DIAGNOSIS — H53.2 MONOCULAR DIPLOPIA OF BOTH EYES: ICD-10-CM

## 2024-11-01 DIAGNOSIS — H52.4 HYPEROPIA WITH PRESBYOPIA OF BOTH EYES: ICD-10-CM

## 2024-11-01 DIAGNOSIS — H04.129 DRY EYE: Primary | ICD-10-CM

## 2024-11-01 PROCEDURE — 99213 OFFICE O/P EST LOW 20 MIN: CPT | Performed by: OPTOMETRIST

## 2024-11-01 PROCEDURE — 92015 DETERMINE REFRACTIVE STATE: CPT | Performed by: OPTOMETRIST

## 2024-11-01 ASSESSMENT — VISUAL ACUITY
OS_CC: 20/25
OD_CC: J1+
OS_CC: J1+
VA_OR_OD_CURRENT_RX: 20/25-2
OD_CC+: -2
METHOD: SNELLEN - LINEAR
OD_CC: 20/25
VA_OR_OS_CURRENT_RX: 20/25
CORRECTION_TYPE: GLASSES
OS_CC+: +2

## 2024-11-01 ASSESSMENT — REFRACTION_MANIFEST
OD_SPHERE: +1.75
OD_CYLINDER: SPHERE
OS_SPHERE: +1.00
OS_AXIS: 106
OS_CYLINDER: +0.50

## 2024-11-01 ASSESSMENT — REFRACTION_CURRENTRX
OD_AXIS: 100
OS_ADDL_SPECS: BOSTON XO BLUE, HYDRAPEG
OD_BASECURVE: 4.0/7.5
OD_SPHERE: +2.50
OS_AXIS: 085
OS_DIAMETER: 14.8
OD_BRAND: ZENLENS RC
OS_CYLINDER: -1.75
OS_SPHERE: +2.50
OD_CYLINDER: -1.50
OS_BRAND: ZENLENS RC
OD_DIAMETER: 14.8
OS_BASECURVE: 4.0/7.5

## 2024-11-01 ASSESSMENT — REFRACTION_WEARINGRX
OS_CYLINDER: +0.50
OD_SPHERE: +1.75
SPECS_TYPE: PAL
OD_CYLINDER: +0.25
OS_AXIS: 106
OD_ADD: +2.50
OS_ADD: +2.50
OD_AXIS: 004
OS_SPHERE: +1.25

## 2024-11-01 ASSESSMENT — CONF VISUAL FIELD
OD_NORMAL: 1
OS_INFERIOR_TEMPORAL_RESTRICTION: 0
OS_INFERIOR_NASAL_RESTRICTION: 0
OD_INFERIOR_NASAL_RESTRICTION: 0
OD_INFERIOR_TEMPORAL_RESTRICTION: 0
OD_SUPERIOR_NASAL_RESTRICTION: 0
OS_NORMAL: 1
OS_SUPERIOR_NASAL_RESTRICTION: 0
OD_SUPERIOR_TEMPORAL_RESTRICTION: 0
OS_SUPERIOR_TEMPORAL_RESTRICTION: 0

## 2024-11-01 ASSESSMENT — SLIT LAMP EXAM - LIDS
COMMENTS: MGD
COMMENTS: MGD

## 2024-11-01 ASSESSMENT — TONOMETRY
IOP_METHOD: ICARE
OS_IOP_MMHG: 7
OD_IOP_MMHG: 8

## 2024-11-01 ASSESSMENT — EXTERNAL EXAM - LEFT EYE: OS_EXAM: NORMAL

## 2024-11-01 ASSESSMENT — EXTERNAL EXAM - RIGHT EYE: OD_EXAM: NORMAL

## 2024-11-01 NOTE — PROGRESS NOTES
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 Previous soft CL wearer  -Irregular topos - may be caused by decreased blink rate affecting corneal physiology  -BCVA with scleral lens 20/20- both eyes - monocular diplopia significantly improved but not resolved  -Does require FST for elimination of shadows  -No overrefraction. Largely good fit - no changes recommended at this time  -OTC readers around +2.00 range okay  -Continue with current CL's - reviewed CL care and hygiene. Lighted  LED given today to help with insertion, as this has been the biggest challenge to date    2.) Dry Eye OU  -Well controlled with scleral lens use  -When not wearing CL's can increase AT    3.) H/o meningioma  -Saw Dr. Jacobsen for full workup 07/2024, without visual sequelae  -Continue to monitor    Doing well, no changes recommended. Follow-up 1 year annual, sooner prn    I have confirmed the patient's CC, HPI and reviewed Past Medical History, Past Surgical History, Social History, Family History, Problem List, Medication List and agree with Tech note.     Yue Monterroso OD FAAO [facility]

## 2024-11-27 ENCOUNTER — PRE VISIT (OUTPATIENT)
Dept: OTOLARYNGOLOGY | Facility: CLINIC | Age: 59
End: 2024-11-27

## 2024-12-30 DIAGNOSIS — F31.32 MODERATE DEPRESSED BIPOLAR I DISORDER (H): Primary | ICD-10-CM

## 2025-01-02 ENCOUNTER — LAB (OUTPATIENT)
Dept: LAB | Facility: CLINIC | Age: 60
End: 2025-01-02
Payer: COMMERCIAL

## 2025-01-02 DIAGNOSIS — F31.32 MODERATE DEPRESSED BIPOLAR I DISORDER (H): ICD-10-CM

## 2025-01-02 LAB — LITHIUM SERPL-SCNC: 0.31 MMOL/L (ref 0.6–1.2)

## 2025-05-04 ENCOUNTER — HEALTH MAINTENANCE LETTER (OUTPATIENT)
Age: 60
End: 2025-05-04

## 2025-07-07 ENCOUNTER — TELEPHONE (OUTPATIENT)
Dept: DERMATOLOGY | Facility: CLINIC | Age: 60
End: 2025-07-07
Payer: COMMERCIAL

## 2025-07-07 NOTE — TELEPHONE ENCOUNTER
[facility] Call Center    Phone Message    May a detailed message be left on voicemail: yes     Reason for Call: Symptoms or Concerns     If patient has red-flag symptoms, warm transfer to triage line    Current symptom or concern: Spot on leg growing/ looks suspicious    Symptoms have been present for:  11 month(s)    Has patient previously been seen for this? No      Are there any new or worsening symptoms? Yes    Action Taken: TE SENT    Travel Screening: Not Applicable     Date of Service:             Thank you!  [facility]

## 2025-07-30 ENCOUNTER — MEDICAL CORRESPONDENCE (OUTPATIENT)
Dept: HEALTH INFORMATION MANAGEMENT | Facility: CLINIC | Age: 60
End: 2025-07-30
Payer: COMMERCIAL

## 2025-07-31 ENCOUNTER — TRANSCRIBE ORDERS (OUTPATIENT)
Dept: OTHER | Age: 60
End: 2025-07-31

## 2025-07-31 DIAGNOSIS — D32.0 INTRACRANIAL MENINGIOMA (H): Primary | ICD-10-CM

## 2025-08-08 ENCOUNTER — TELEPHONE (OUTPATIENT)
Dept: NEUROSURGERY | Facility: CLINIC | Age: 60
End: 2025-08-08
Payer: COMMERCIAL

## 2025-08-20 ENCOUNTER — TELEPHONE (OUTPATIENT)
Dept: OPTOMETRY | Facility: CLINIC | Age: 60
End: 2025-08-20

## 2025-08-26 ENCOUNTER — MYC MEDICAL ADVICE (OUTPATIENT)
Dept: NEUROSURGERY | Facility: CLINIC | Age: 60
End: 2025-08-26
Payer: COMMERCIAL

## 2025-08-26 ENCOUNTER — OFFICE VISIT (OUTPATIENT)
Dept: OPTOMETRY | Facility: CLINIC | Age: 60
End: 2025-08-26
Payer: COMMERCIAL

## 2025-08-26 DIAGNOSIS — H53.2 MONOCULAR DIPLOPIA: ICD-10-CM

## 2025-08-26 DIAGNOSIS — H52.213 IRREGULAR ASTIGMATISM OF BOTH EYES: ICD-10-CM

## 2025-08-26 DIAGNOSIS — H04.123 DRY EYES: Primary | ICD-10-CM

## 2025-08-26 ASSESSMENT — REFRACTION_CURRENTRX
OD_DIAMETER: 14.8
OS_DIAMETER: 14.8
OD_SPHERE: +2.50
OD_BRAND: ZENLENS RC
OS_CYLINDER: -1.75
OS_AXIS: 085
OD_CYLINDER: -1.50
OS_SPHERE: +2.50
OS_ADDL_SPECS: BOSTON XO BLUE, HYDRAPEG
OD_BASECURVE: 4.0/7.5
OS_BRAND: ZENLENS RC
OD_AXIS: 100
OS_BASECURVE: 4.0/7.5

## (undated) DEVICE — PACK MINOR EYE CUSTOM ASC

## (undated) DEVICE — EYE PREP BETADINE 5% SOLUTION 30ML 0065-0411-30

## (undated) DEVICE — GLOVE BIOGEL PI MICRO SZ 7.5 48575

## (undated) DEVICE — SOL WATER IRRIG 500ML BOTTLE 2F7113

## (undated) DEVICE — ESU HIGH TEMP LOOP TIP AA03

## (undated) DEVICE — LINEN TOWEL PACK X5 5464

## (undated) RX ORDER — PROPOFOL 10 MG/ML
INJECTION, EMULSION INTRAVENOUS
Status: DISPENSED
Start: 2023-07-07

## (undated) RX ORDER — ACETAMINOPHEN 325 MG/1
TABLET ORAL
Status: DISPENSED
Start: 2023-07-07

## (undated) RX ORDER — ONDANSETRON 2 MG/ML
INJECTION INTRAMUSCULAR; INTRAVENOUS
Status: DISPENSED
Start: 2023-07-07

## (undated) RX ORDER — FENTANYL CITRATE 50 UG/ML
INJECTION, SOLUTION INTRAMUSCULAR; INTRAVENOUS
Status: DISPENSED
Start: 2023-07-07

## (undated) RX ORDER — DEXAMETHASONE SODIUM PHOSPHATE 10 MG/ML
INJECTION, SOLUTION INTRAMUSCULAR; INTRAVENOUS
Status: DISPENSED
Start: 2023-07-07